# Patient Record
Sex: MALE | Race: WHITE | Employment: OTHER | URBAN - METROPOLITAN AREA
[De-identification: names, ages, dates, MRNs, and addresses within clinical notes are randomized per-mention and may not be internally consistent; named-entity substitution may affect disease eponyms.]

---

## 2017-05-15 ENCOUNTER — OFFICE VISIT (OUTPATIENT)
Dept: CARDIOLOGY CLINIC | Age: 67
End: 2017-05-15

## 2017-05-15 VITALS
WEIGHT: 245.4 LBS | OXYGEN SATURATION: 98 % | BODY MASS INDEX: 33.24 KG/M2 | HEART RATE: 78 BPM | DIASTOLIC BLOOD PRESSURE: 70 MMHG | HEIGHT: 72 IN | SYSTOLIC BLOOD PRESSURE: 120 MMHG

## 2017-05-15 DIAGNOSIS — I25.10 CORONARY ARTERY DISEASE INVOLVING NATIVE CORONARY ARTERY OF NATIVE HEART WITHOUT ANGINA PECTORIS: Primary | ICD-10-CM

## 2017-05-15 DIAGNOSIS — I10 ESSENTIAL HYPERTENSION: ICD-10-CM

## 2017-05-15 DIAGNOSIS — E78.00 PURE HYPERCHOLESTEROLEMIA: ICD-10-CM

## 2017-05-15 PROCEDURE — G8427 DOCREV CUR MEDS BY ELIG CLIN: HCPCS | Performed by: INTERNAL MEDICINE

## 2017-05-15 PROCEDURE — 3017F COLORECTAL CA SCREEN DOC REV: CPT | Performed by: INTERNAL MEDICINE

## 2017-05-15 PROCEDURE — 99214 OFFICE O/P EST MOD 30 MIN: CPT | Performed by: INTERNAL MEDICINE

## 2017-05-15 PROCEDURE — 1123F ACP DISCUSS/DSCN MKR DOCD: CPT | Performed by: INTERNAL MEDICINE

## 2017-05-15 PROCEDURE — 4040F PNEUMOC VAC/ADMIN/RCVD: CPT | Performed by: INTERNAL MEDICINE

## 2017-05-15 PROCEDURE — G8419 CALC BMI OUT NRM PARAM NOF/U: HCPCS | Performed by: INTERNAL MEDICINE

## 2017-05-15 PROCEDURE — G8598 ASA/ANTIPLAT THER USED: HCPCS | Performed by: INTERNAL MEDICINE

## 2017-05-15 PROCEDURE — 1036F TOBACCO NON-USER: CPT | Performed by: INTERNAL MEDICINE

## 2017-05-15 ASSESSMENT — ENCOUNTER SYMPTOMS
ABDOMINAL DISTENTION: 0
SHORTNESS OF BREATH: 0
WHEEZING: 0
BLOOD IN STOOL: 0
EYE REDNESS: 0
COUGH: 0

## 2018-01-16 ENCOUNTER — OFFICE VISIT (OUTPATIENT)
Dept: CARDIOLOGY CLINIC | Age: 68
End: 2018-01-16

## 2018-01-16 VITALS
OXYGEN SATURATION: 96 % | HEART RATE: 86 BPM | WEIGHT: 256 LBS | HEIGHT: 72 IN | BODY MASS INDEX: 34.67 KG/M2 | DIASTOLIC BLOOD PRESSURE: 104 MMHG | SYSTOLIC BLOOD PRESSURE: 164 MMHG

## 2018-01-16 DIAGNOSIS — I10 ESSENTIAL HYPERTENSION: ICD-10-CM

## 2018-01-16 DIAGNOSIS — I25.10 CORONARY ARTERY DISEASE INVOLVING NATIVE CORONARY ARTERY OF NATIVE HEART WITHOUT ANGINA PECTORIS: Primary | ICD-10-CM

## 2018-01-16 DIAGNOSIS — E78.00 PURE HYPERCHOLESTEROLEMIA: ICD-10-CM

## 2018-01-16 PROCEDURE — G8427 DOCREV CUR MEDS BY ELIG CLIN: HCPCS | Performed by: INTERNAL MEDICINE

## 2018-01-16 PROCEDURE — 1036F TOBACCO NON-USER: CPT | Performed by: INTERNAL MEDICINE

## 2018-01-16 PROCEDURE — G8417 CALC BMI ABV UP PARAM F/U: HCPCS | Performed by: INTERNAL MEDICINE

## 2018-01-16 PROCEDURE — 1123F ACP DISCUSS/DSCN MKR DOCD: CPT | Performed by: INTERNAL MEDICINE

## 2018-01-16 PROCEDURE — 3017F COLORECTAL CA SCREEN DOC REV: CPT | Performed by: INTERNAL MEDICINE

## 2018-01-16 PROCEDURE — G8598 ASA/ANTIPLAT THER USED: HCPCS | Performed by: INTERNAL MEDICINE

## 2018-01-16 PROCEDURE — 4040F PNEUMOC VAC/ADMIN/RCVD: CPT | Performed by: INTERNAL MEDICINE

## 2018-01-16 PROCEDURE — 99214 OFFICE O/P EST MOD 30 MIN: CPT | Performed by: INTERNAL MEDICINE

## 2018-01-16 PROCEDURE — G8484 FLU IMMUNIZE NO ADMIN: HCPCS | Performed by: INTERNAL MEDICINE

## 2018-01-16 RX ORDER — AMLODIPINE BESYLATE 10 MG/1
10 TABLET ORAL DAILY
Qty: 30 TABLET | Refills: 6 | Status: SHIPPED | OUTPATIENT
Start: 2018-01-16 | End: 2018-01-17 | Stop reason: SDUPTHER

## 2018-01-16 RX ORDER — OMEPRAZOLE 20 MG/1
20 CAPSULE, DELAYED RELEASE ORAL DAILY
Status: ON HOLD | COMMUNITY
End: 2022-03-31 | Stop reason: HOSPADM

## 2018-01-16 ASSESSMENT — ENCOUNTER SYMPTOMS
SHORTNESS OF BREATH: 0
EYE REDNESS: 0
BLOOD IN STOOL: 0
ABDOMINAL DISTENTION: 0
WHEEZING: 0
COUGH: 0

## 2018-01-16 NOTE — PATIENT INSTRUCTIONS
Patient Education        Learning About Diuretics for High Blood Pressure  Introduction  Diuretics help to lower blood pressure. This reduces your risk of a heart attack and stroke. It also reduces your risk of kidney disease. Diuretics cause your kidneys to remove sodium and water. They also relax the blood vessel walls. These help lower your blood pressure. Examples  · Chlorthalidone  · Hydrochlorothiazide  Possible side effects  There are some common side effects. They are:  · Too little potassium. · Feeling dizzy. · Rash. · Urinating a lot. · High blood sugar. (But this is not common.)  You may have other side effects. Check the information that comes with your medicine. What to know about taking this medicine  · You may take other medicines for blood pressure. Diuretics can help those work better. They can also prevent extra fluid in your body. · You may need to take potassium pills. Or you may have to watch how much potassium is in your food. Ask your doctor about this. · You may need blood tests to check your kidneys and your potassium level. · Take your medicines exactly as prescribed. Call your doctor if you think you are having a problem with your medicine. · Check with your doctor or pharmacist before you use any other medicines. This includes over-the-counter medicines. Make sure your doctor knows all of the medicines, vitamins, herbal products, and supplements you take. Taking some medicines together can cause problems. Where can you learn more? Go to https://[email or web address]'s [email or web address] org and sign in to your AdviceIQ account. Enter S781 in the [facility] box to learn more about \"Learning About Diuretics for High Blood Pressure. \"     If you do not have an account, please click on the \"Sign Up Now\" link. Current as of: September 21, 2016  Content Version: 11.5  © 0302-1233 Healthwise, Incorporated. Care instructions adapted under license by [facility] ([facility]).  If you have questions about a medical condition or this instruction, always ask your healthcare professional. [address] any warranty or liability for your use of this information. INCREASE NORVASC TO 10MG DAILY.      Patient Education        Patient Education

## 2018-01-16 NOTE — PROGRESS NOTES
(!) 164/104, pulse 86, height 6' (1.829 m), weight 256 lb (116.1 kg), SpO2 96 %. Vitals:    01/16/18 1301 01/16/18 1305   BP: (!) 162/100 (!) 164/104   Pulse: 86    SpO2: 96%    Weight: 256 lb (116.1 kg)    Height: 6' (1.829 m)      Body mass index is 34.72 kg/m². Wt Readings from Last 3 Encounters:   01/16/18 256 lb (116.1 kg)   05/15/17 245 lb 6.4 oz (111.3 kg)   10/24/16 241 lb 6.4 oz (109.5 kg)     BP Readings from Last 3 Encounters:   01/16/18 (!) 164/104   05/15/17 120/70   10/24/16 112/80        Current Outpatient Prescriptions   Medication Sig Dispense Refill    omeprazole (PRILOSEC) 20 MG delayed release capsule Take 20 mg by mouth daily      Multiple Vitamins-Minerals (MULTIVITAMIN ADULT PO) Take by mouth daily      amLODIPine (NORVASC) 2.5 MG tablet Take 1 tablet by mouth daily (Patient taking differently: Take 5 mg by mouth daily ) 30 tablet 3    atorvastatin (LIPITOR) 40 MG tablet Take 40 mg by mouth daily      metoprolol (LOPRESSOR) 25 MG tablet Take 25 mg by mouth 2 times daily      aspirin 81 MG tablet Take 81 mg by mouth daily       No current facility-administered medications for this visit. Social History     Social History    Marital status:      Spouse name: N/A    Number of children: N/A    Years of education: N/A     Social History Main Topics    Smoking status: Former Smoker     Quit date: 1/1/1994    Smokeless tobacco: Never Used    Alcohol use Yes      Comment: occasional    Drug use: No    Sexual activity: Not Asked     Other Topics Concern    None     Social History Narrative    None     Past Surgical History:   Procedure Laterality Date    CHOLECYSTECTOMY      5/2017    CORONARY ANGIOPLASTY WITH STENT PLACEMENT      PROSTATECTOMY  03/2013     No Known Allergies  Family History   Problem Relation Age of Onset    Heart Disease Father     Cancer Sister     Cancer Brother     Cancer Maternal Uncle      Recent labs and imaging reviewed.

## 2018-01-17 RX ORDER — AMLODIPINE BESYLATE 10 MG/1
10 TABLET ORAL DAILY
Qty: 90 TABLET | Refills: 3 | Status: SHIPPED | OUTPATIENT
Start: 2018-01-17 | End: 2019-04-11 | Stop reason: SDUPTHER

## 2018-07-30 ASSESSMENT — ENCOUNTER SYMPTOMS
EYE REDNESS: 0
WHEEZING: 0
COUGH: 0
SHORTNESS OF BREATH: 0
ABDOMINAL DISTENTION: 0
BLOOD IN STOOL: 0

## 2018-07-30 NOTE — PROGRESS NOTES
Arnaldo Saravia is a 78 y.o. male    Reason for Visit: f/u CAD  CC: \"I am doing OK \"    HPI Arnaldo Saravia denies chest pain, palpitations, dizziness, syncope and increased dyspnea. He has some ankle swelling when he wears socks. Review of Systems   Constitutional: Negative for activity change, appetite change, chills, fatigue, fever and unexpected weight change. HENT: Negative for congestion, nosebleeds and tinnitus. Eyes: Negative for redness and visual disturbance. Respiratory: Negative for cough, shortness of breath and wheezing. Cardiovascular: Negative for chest pain, palpitations and leg swelling. Gastrointestinal: Negative for abdominal distention and blood in stool. Genitourinary: Negative for dysuria and hematuria. Musculoskeletal: Negative for gait problem and myalgias. Neurological: Negative for dizziness and speech difficulty. Hematological: Does not bruise/bleed easily. Psychiatric/Behavioral: Negative for behavioral problems and confusion. All other systems reviewed and are negative. Physical Exam   Constitutional: He is oriented to person, place, and time. He appears well-developed and well-nourished. HENT:   Head: Normocephalic and atraumatic. Eyes: Conjunctivae are normal. Right eye exhibits no discharge. Left eye exhibits no discharge. Neck: Normal range of motion. Neck supple. Cardiovascular: Normal rate, regular rhythm, normal heart sounds and intact distal pulses. Pulmonary/Chest: Effort normal and breath sounds normal.   Abdominal: Soft. Bowel sounds are normal.   Musculoskeletal: Normal range of motion. He exhibits no edema. Neurological: He is alert and oriented to person, place, and time. Skin: Skin is warm and dry. Psychiatric: He has a normal mood and affect. His behavior is normal.   Nursing note and vitals reviewed.       Wt Readings from Last 3 Encounters:   01/16/18 256 lb (116.1 kg)   05/15/17 245 lb 6.4 oz (111.3 kg)   10/24/16 241 lb 6.4 oz (109.5 kg)     BP Readings from Last 3 Encounters:   07/31/18 (!) 140/100   01/16/18 (!) 164/104   05/15/17 120/70     Pulse Readings from Last 3 Encounters:   07/31/18 82   01/16/18 86   05/15/17 78         Current Outpatient Prescriptions:     metoprolol tartrate (LOPRESSOR) 25 MG tablet, Take 50 mg in AM and 25mg at night, Disp: 180 tablet, Rfl: 3    amLODIPine (NORVASC) 10 MG tablet, Take 1 tablet by mouth daily, Disp: 90 tablet, Rfl: 3    omeprazole (PRILOSEC) 20 MG delayed release capsule, Take 20 mg by mouth daily, Disp: , Rfl:     Multiple Vitamins-Minerals (MULTIVITAMIN ADULT PO), Take by mouth daily, Disp: , Rfl:     atorvastatin (LIPITOR) 40 MG tablet, Take 40 mg by mouth daily, Disp: , Rfl:     aspirin 81 MG tablet, Take 81 mg by mouth daily, Disp: , Rfl:     Past Medical History:   Diagnosis Date    CAD (coronary artery disease)     S/P NSTEMI with SURINDER LCx 4/14/15    History of tobacco use     Quit smoking.      Hyperlipidemia     on statin, LDL     Hypertension     controlled    Plantar fasciitis     Prostate cancer [facility])        Social History     Social History    Marital status:      Spouse name: N/A    Number of children: N/A    Years of education: N/A     Social History Main Topics    Smoking status: Former Smoker     Quit date: 1/1/1994    Smokeless tobacco: Never Used    Alcohol use Yes      Comment: occasional    Drug use: No    Sexual activity: Not Asked     Other Topics Concern    None     Social History Narrative    None       Past Surgical History:   Procedure Laterality Date    CHOLECYSTECTOMY      5/2017    CORONARY ANGIOPLASTY WITH STENT PLACEMENT      PROSTATECTOMY  03/2013       Family History   Problem Relation Age of Onset    Heart Disease Father     Cancer Sister     Cancer Brother     Cancer Maternal Uncle        No Known Allergies    Recent Labs and Imaging reviewed    Assessment   Hyperlipidemia     on statin, LDL 72 1/2018   

## 2018-07-31 ENCOUNTER — OFFICE VISIT (OUTPATIENT)
Dept: CARDIOLOGY CLINIC | Age: 68
End: 2018-07-31

## 2018-07-31 VITALS
OXYGEN SATURATION: 98 % | HEIGHT: 72 IN | HEART RATE: 82 BPM | DIASTOLIC BLOOD PRESSURE: 100 MMHG | SYSTOLIC BLOOD PRESSURE: 140 MMHG

## 2018-07-31 DIAGNOSIS — I10 ESSENTIAL HYPERTENSION: ICD-10-CM

## 2018-07-31 DIAGNOSIS — E78.00 PURE HYPERCHOLESTEROLEMIA: ICD-10-CM

## 2018-07-31 DIAGNOSIS — I25.10 CORONARY ARTERY DISEASE INVOLVING NATIVE CORONARY ARTERY OF NATIVE HEART WITHOUT ANGINA PECTORIS: Primary | ICD-10-CM

## 2018-07-31 PROCEDURE — 1036F TOBACCO NON-USER: CPT | Performed by: INTERNAL MEDICINE

## 2018-07-31 PROCEDURE — 1101F PT FALLS ASSESS-DOCD LE1/YR: CPT | Performed by: INTERNAL MEDICINE

## 2018-07-31 PROCEDURE — G8417 CALC BMI ABV UP PARAM F/U: HCPCS | Performed by: INTERNAL MEDICINE

## 2018-07-31 PROCEDURE — 99214 OFFICE O/P EST MOD 30 MIN: CPT | Performed by: INTERNAL MEDICINE

## 2018-07-31 PROCEDURE — G8598 ASA/ANTIPLAT THER USED: HCPCS | Performed by: INTERNAL MEDICINE

## 2018-07-31 PROCEDURE — 3017F COLORECTAL CA SCREEN DOC REV: CPT | Performed by: INTERNAL MEDICINE

## 2018-07-31 PROCEDURE — 4040F PNEUMOC VAC/ADMIN/RCVD: CPT | Performed by: INTERNAL MEDICINE

## 2018-07-31 PROCEDURE — 1123F ACP DISCUSS/DSCN MKR DOCD: CPT | Performed by: INTERNAL MEDICINE

## 2018-07-31 PROCEDURE — G8427 DOCREV CUR MEDS BY ELIG CLIN: HCPCS | Performed by: INTERNAL MEDICINE

## 2018-07-31 NOTE — PATIENT INSTRUCTIONS
irregular heartbeat.     · You have symptoms of a stroke. These may include:  ¨ Sudden numbness, tingling, weakness, or loss of movement in your face, arm, or leg, especially on only one side of your body. ¨ Sudden vision changes. ¨ Sudden trouble speaking. ¨ Sudden confusion or trouble understanding simple statements. ¨ Sudden problems with walking or balance. ¨ A sudden, severe headache that is different from past headaches.     · You have severe back or belly pain.    Do not wait until your blood pressure comes down on its own. Get help right away.   Call your doctor now or seek immediate care if:    · Your blood pressure is much higher than normal (such as 180/110 or higher), but you don't have symptoms.     · You think high blood pressure is causing symptoms, such as:  ¨ Severe headache. ¨ Blurry vision.    Watch closely for changes in your health, and be sure to contact your doctor if:    · Your blood pressure measures 140/90 or higher at least 2 times. That means the top number is 140 or higher or the bottom number is 90 or higher, or both.     · You think you may be having side effects from your blood pressure medicine.     · Your blood pressure is usually normal, but it goes above normal at least 2 times. Where can you learn more? Go to [email or web address] [email or web address] Media [email or web address] org and sign in to your YupiCall account. Enter Q708 in the [employer] box to learn more about \"High Blood Pressure: Care Instructions. \"     If you do not have an account, please click on the \"Sign Up Now\" link. Current as of: December 6, 2017  Content Version: 11.6  © 0955-6272 [employer], Incorporated. Care instructions adapted under license by [facility] ([facility]). If you have questions about a medical condition or this instruction, always ask your healthcare professional. [place] [address] any warranty or liability for your use of this information.        Patient Education        High Blood Pressure: Care Instructions  Your Care Instructions    If your blood pressure is usually above 130/80, you have high blood pressure, or hypertension. That means the top number is 130 or higher or the bottom number is 80 or higher, or both. Despite what a lot of people think, high blood pressure usually doesn't cause headaches or make you feel dizzy or lightheaded. It usually has no symptoms. But it does increase your risk for heart attack, stroke, and kidney or eye damage. The higher your blood pressure, the more your risk increases. Your doctor will give you a goal for your blood pressure. Your goal will be based on your health and your age. Lifestyle changes, such as eating healthy and being active, are always important to help lower blood pressure. You might also take medicine to reach your blood pressure goal.  Follow-up care is a key part of your treatment and safety. Be sure to make and go to all appointments, and call your doctor if you are having problems. It's also a good idea to know your test results and keep a list of the medicines you take. How can you care for yourself at home? Medical treatment  · If you stop taking your medicine, your blood pressure will go back up. You may take one or more types of medicine to lower your blood pressure. Be safe with medicines. Take your medicine exactly as prescribed. Call your doctor if you think you are having a problem with your medicine. · Talk to your doctor before you start taking aspirin every day. Aspirin can help certain people lower their risk of a heart attack or stroke. But taking aspirin isn't right for everyone, because it can cause serious bleeding. · See your doctor regularly. You may need to see the doctor more often at first or until your blood pressure comes down. · If you are taking blood pressure medicine, talk to your doctor before you take decongestants or anti-inflammatory medicine, such as ibuprofen.  Some of these medicines can raise blood pressure. · Learn how to check your blood pressure at home. Lifestyle changes  · Stay at a healthy weight. This is especially important if you put on weight around the waist. Losing even 10 pounds can help you lower your blood pressure. · If your doctor recommends it, get more exercise. Walking is a good choice. Bit by bit, increase the amount you walk every day. Try for at least 30 minutes on most days of the week. You also may want to swim, bike, or do other activities. · Avoid or limit alcohol. Talk to your doctor about whether you can drink any alcohol. · Try to limit how much sodium you eat to less than 2,300 milligrams (mg) a day. Your doctor may ask you to try to eat less than 1,500 mg a day. · Eat plenty of fruits (such as bananas and oranges), vegetables, legumes, whole grains, and low-fat dairy products. · Lower the amount of saturated fat in your diet. Saturated fat is found in animal products such as milk, cheese, and meat. Limiting these foods may help you lose weight and also lower your risk for heart disease. · Do not smoke. Smoking increases your risk for heart attack and stroke. If you need help quitting, talk to your doctor about stop-smoking programs and medicines. These can increase your chances of quitting for good. When should you call for help? Call 911 anytime you think you may need emergency care. This may mean having symptoms that suggest that your blood pressure is causing a serious heart or blood vessel problem. Your blood pressure may be over 180/110.   For example, call 911 if:    · You have symptoms of a heart attack. These may include:  ¨ Chest pain or pressure, or a strange feeling in the chest.  ¨ Sweating. ¨ Shortness of breath. ¨ Nausea or vomiting. ¨ Pain, pressure, or a strange feeling in the back, neck, jaw, or upper belly or in one or both shoulders or arms. ¨ Lightheadedness or sudden weakness.   ¨ A fast or irregular heartbeat.     · You have symptoms of a stroke. These may include:  ¨ Sudden numbness, tingling, weakness, or loss of movement in your face, arm, or leg, especially on only one side of your body. ¨ Sudden vision changes. ¨ Sudden trouble speaking. ¨ Sudden confusion or trouble understanding simple statements. ¨ Sudden problems with walking or balance. ¨ A sudden, severe headache that is different from past headaches.     · You have severe back or belly pain.    Do not wait until your blood pressure comes down on its own. Get help right away.   Call your doctor now or seek immediate care if:    · Your blood pressure is much higher than normal (such as 180/110 or higher), but you don't have symptoms.     · You think high blood pressure is causing symptoms, such as:  ¨ Severe headache. ¨ Blurry vision.    Watch closely for changes in your health, and be sure to contact your doctor if:    · Your blood pressure measures 140/90 or higher at least 2 times. That means the top number is 140 or higher or the bottom number is 90 or higher, or both.     · You think you may be having side effects from your blood pressure medicine.     · Your blood pressure is usually normal, but it goes above normal at least 2 times. Where can you learn more? Go to https://Tumbie.Epic Playground. org and sign in to your iodine account. Enter U714 in the [employer] box to learn more about \"High Blood Pressure: Care Instructions. \"     If you do not have an account, please click on the \"Sign Up Now\" link. Current as of: December 6, 2017  Content Version: 11.6  © 3809-5837 LoungeUp, Incorporated. Care instructions adapted under license by [facility] ([facility]). If you have questions about a medical condition or this instruction, always ask your healthcare professional. [place] [address] any warranty or liability for your use of this information. INCREASE LOPRESSOR TO 50MG IN AM AND 25MG AT NIGHT.

## 2018-07-31 NOTE — LETTER
[address] Cardiology - [address] [facility] [address]  [address]   [address] [address]  Phone: [phone]  Fax: [phone]    Santos Dela Cruz MD        2018     Shala Rivers, [address]  [address]    Patient: Tyler Nelson  MR Number: [id number]  YOB: 1950  Date of Visit: 2018    Dear Dr. Shala Rivers:     HPI Tyler Nelson denies chest pain, palpitations, dizziness, syncope and increased dyspnea. He has some ankle swelling when he wears socks. Assessment   Hyperlipidemia     on statin, LDL 72 2018    Hypertension     Uncontrolled.  CAD (coronary artery disease)     S/P NSTEMI with SURINDER LCx 4/14/15 (), Echo 2015 normal LVEF. Stress echo 2016 normal.  Plain GXT 3/2018 normal.     History of tobacco use     Quit smoking.  Prostate cancer ([address]- hx        Hx hyperkalemia - ACE stopped in past. Repeat K normal.        DAVID- treated with mask. Plan   No evidence of CHF. No angina. In view of uncontrolled HTN, will increase lopressor to 50mg in AM and 25mg at night. Continue to monitor BP. Continue ASA, statin, and norvasc. Risk factor modification also discussed. Fasting lipid profile, CMP prior to next visit. Return in about 6 months (around 2019). If you have questions, please do not hesitate to call me. I look forward to following Melinda Luna along with you.     Sincerely,        Santos Dela Cruz MD

## 2018-08-02 NOTE — COMMUNICATION BODY
Assessment:       Plan:     PERCY Ghotra denies chest pain, palpitations, dizziness, syncope and increased dyspnea. He has some ankle swelling when he wears socks. Assessment   Hyperlipidemia     on statin, LDL 72 2018    Hypertension     Uncontrolled.  CAD (coronary artery disease)     S/P NSTEMI with SURINDER LCx 4/14/15 (), Echo 2015 normal LVEF. Stress echo 2016 normal.  Plain GXT 3/2018 normal.     History of tobacco use     Quit smoking.  Prostate cancer ([facility] [address]- hx        Hx hyperkalemia - ACE stopped in past. Repeat K normal.        DAVID- treated with mask. Plan   No evidence of CHF. No angina. In view of uncontrolled HTN, will increase lopressor to 50mg in AM and 25mg at night. Continue to monitor BP. Continue ASA, statin, and norvasc. Risk factor modification also discussed. Fasting lipid profile, CMP prior to next visit. Return in about 6 months (around 2019).

## 2019-01-14 ENCOUNTER — TELEPHONE (OUTPATIENT)
Dept: CARDIOLOGY CLINIC | Age: 69
End: 2019-01-14

## 2019-01-15 ENCOUNTER — TELEPHONE (OUTPATIENT)
Dept: CARDIOLOGY CLINIC | Age: 69
End: 2019-01-15

## 2019-02-26 ASSESSMENT — ENCOUNTER SYMPTOMS
EYE REDNESS: 0
WHEEZING: 0
ABDOMINAL DISTENTION: 0
BLOOD IN STOOL: 0
SHORTNESS OF BREATH: 0
COUGH: 0

## 2019-03-05 ENCOUNTER — OFFICE VISIT (OUTPATIENT)
Dept: CARDIOLOGY CLINIC | Age: 69
End: 2019-03-05
Payer: COMMERCIAL

## 2019-03-05 VITALS
OXYGEN SATURATION: 99 % | SYSTOLIC BLOOD PRESSURE: 130 MMHG | HEART RATE: 76 BPM | BODY MASS INDEX: 33.05 KG/M2 | WEIGHT: 244 LBS | DIASTOLIC BLOOD PRESSURE: 90 MMHG | HEIGHT: 72 IN

## 2019-03-05 DIAGNOSIS — I10 ESSENTIAL HYPERTENSION: ICD-10-CM

## 2019-03-05 DIAGNOSIS — E78.00 PURE HYPERCHOLESTEROLEMIA: ICD-10-CM

## 2019-03-05 DIAGNOSIS — I21.4 NSTEMI (NON-ST ELEVATED MYOCARDIAL INFARCTION) (HCC): ICD-10-CM

## 2019-03-05 DIAGNOSIS — I25.10 CORONARY ARTERY DISEASE INVOLVING NATIVE CORONARY ARTERY OF NATIVE HEART WITHOUT ANGINA PECTORIS: Primary | ICD-10-CM

## 2019-03-05 PROCEDURE — G8598 ASA/ANTIPLAT THER USED: HCPCS | Performed by: INTERNAL MEDICINE

## 2019-03-05 PROCEDURE — 1123F ACP DISCUSS/DSCN MKR DOCD: CPT | Performed by: INTERNAL MEDICINE

## 2019-03-05 PROCEDURE — 1036F TOBACCO NON-USER: CPT | Performed by: INTERNAL MEDICINE

## 2019-03-05 PROCEDURE — G8427 DOCREV CUR MEDS BY ELIG CLIN: HCPCS | Performed by: INTERNAL MEDICINE

## 2019-03-05 PROCEDURE — 3017F COLORECTAL CA SCREEN DOC REV: CPT | Performed by: INTERNAL MEDICINE

## 2019-03-05 PROCEDURE — G8417 CALC BMI ABV UP PARAM F/U: HCPCS | Performed by: INTERNAL MEDICINE

## 2019-03-05 PROCEDURE — 4040F PNEUMOC VAC/ADMIN/RCVD: CPT | Performed by: INTERNAL MEDICINE

## 2019-03-05 PROCEDURE — G8484 FLU IMMUNIZE NO ADMIN: HCPCS | Performed by: INTERNAL MEDICINE

## 2019-03-05 PROCEDURE — 1101F PT FALLS ASSESS-DOCD LE1/YR: CPT | Performed by: INTERNAL MEDICINE

## 2019-03-05 PROCEDURE — 99214 OFFICE O/P EST MOD 30 MIN: CPT | Performed by: INTERNAL MEDICINE

## 2019-04-11 RX ORDER — AMLODIPINE BESYLATE 10 MG/1
10 TABLET ORAL DAILY
Qty: 90 TABLET | Refills: 3 | Status: ON HOLD | OUTPATIENT
Start: 2019-04-11 | End: 2022-03-31 | Stop reason: HOSPADM

## 2019-11-18 ENCOUNTER — OFFICE VISIT (OUTPATIENT)
Dept: CARDIOLOGY CLINIC | Age: 69
End: 2019-11-18
Payer: COMMERCIAL

## 2019-11-18 VITALS
WEIGHT: 246.8 LBS | DIASTOLIC BLOOD PRESSURE: 80 MMHG | SYSTOLIC BLOOD PRESSURE: 130 MMHG | HEART RATE: 75 BPM | HEIGHT: 72 IN | BODY MASS INDEX: 33.43 KG/M2 | OXYGEN SATURATION: 96 %

## 2019-11-18 DIAGNOSIS — E78.00 PURE HYPERCHOLESTEROLEMIA: ICD-10-CM

## 2019-11-18 DIAGNOSIS — I25.10 CORONARY ARTERY DISEASE INVOLVING NATIVE CORONARY ARTERY OF NATIVE HEART WITHOUT ANGINA PECTORIS: Primary | ICD-10-CM

## 2019-11-18 PROCEDURE — 99215 OFFICE O/P EST HI 40 MIN: CPT | Performed by: INTERNAL MEDICINE

## 2019-11-18 ASSESSMENT — ENCOUNTER SYMPTOMS
EYE REDNESS: 0
BLOOD IN STOOL: 0
ABDOMINAL DISTENTION: 0
COUGH: 0
SHORTNESS OF BREATH: 0
WHEEZING: 0

## 2020-01-16 ENCOUNTER — TELEPHONE (OUTPATIENT)
Dept: CARDIOLOGY CLINIC | Age: 70
End: 2020-01-16

## 2020-01-16 ENCOUNTER — HOSPITAL ENCOUNTER (OUTPATIENT)
Dept: NON INVASIVE DIAGNOSTICS | Age: 70
Discharge: HOME | End: 2020-01-16
Payer: COMMERCIAL

## 2020-01-16 LAB
LV EF: 55 %
LVEF MODALITY: NORMAL

## 2020-01-16 PROCEDURE — 93017 CV STRESS TEST TRACING ONLY: CPT

## 2020-01-16 PROCEDURE — 93350 STRESS TTE ONLY: CPT

## 2020-01-16 NOTE — TELEPHONE ENCOUNTER
Per Dr. Aaliyah Sherman: Stress echo normal. No cardiac contraindications to operating a motor vehicle

## 2020-01-16 NOTE — TELEPHONE ENCOUNTER
Patient had a stress echo today and needs the results faxed to  at [phone] P- [phone],  once they come back for his appointment with them tomorrow 1/17/2020. Stress echo results are back in [facility].

## 2020-01-16 NOTE — LETTER
[address] Cardiology - [address] [facility] [address]  [address] [address]  Phone: [phone]  Fax: [phone]    Jesus Yo MD        January 16, 2020     Patient: Cayla Madrid   YOB: 1950   Date of Visit: 1/16/2020       To Whom It May Concern: It is my medical opinion that [facility] Stress echo normal. No cardiac contraindications to operating a motor vehicle . If you have any questions or concerns, please don't hesitate to call.     Sincerely,        Jesus Yo MD

## 2020-09-28 ASSESSMENT — ENCOUNTER SYMPTOMS
WHEEZING: 0
BLOOD IN STOOL: 0
ABDOMINAL DISTENTION: 0
EYE REDNESS: 0
SHORTNESS OF BREATH: 0
COUGH: 0

## 2020-09-28 NOTE — PROGRESS NOTES
Amna Sandoval is a 72 y.o. male    Reason for Visit:  f/u CAD                                      HPI Amna Sandoval denies chest pain, palpitations, dizziness, syncope, worsening ankle swelling and increased dyspnea. Review of Systems   Constitutional: Negative for activity change, appetite change, chills, fatigue, fever and unexpected weight change. HENT: Negative for congestion, nosebleeds and tinnitus. Eyes: Negative for redness and visual disturbance. Respiratory: Negative for cough, shortness of breath and wheezing. Cardiovascular: Negative for chest pain, palpitations and leg swelling. Gastrointestinal: Negative for abdominal distention and blood in stool. Genitourinary: Negative for dysuria and hematuria. Musculoskeletal: Negative for gait problem and myalgias. Neurological: Negative for dizziness and speech difficulty. Hematological: Does not bruise/bleed easily. Psychiatric/Behavioral: Negative for behavioral problems and confusion. All other systems reviewed and are negative. Physical Exam   Constitutional: He is oriented to person, place, and time. He appears well-developed and well-nourished. HENT:   Head: Normocephalic and atraumatic. Eyes: Conjunctivae and EOM are normal.   Neck: Normal range of motion. Neck supple. Cardiovascular: Normal rate, regular rhythm, S1 normal and S2 normal. Exam reveals no gallop. No murmur heard. Pulmonary/Chest: Effort normal and breath sounds normal.   Abdominal: Soft. Bowel sounds are normal.   Musculoskeletal: Normal range of motion. Neurological: He is alert and oriented to person, place, and time. Skin: Skin is warm and dry. Psychiatric: He has a normal mood and affect. His behavior is normal.   Nursing note and vitals reviewed.       Wt Readings from Last 3 Encounters:   10/12/20 252 lb 12.8 oz (114.7 kg)   11/18/19 246 lb 12.8 oz (111.9 kg)   03/05/19 244 lb (110.7 kg)     BP Readings from Last 3 Encounters: 10/12/20 132/82   19 130/80   19 (!) 130/90     Pulse Readings from Last 3 Encounters:   10/12/20 76   19 75   19 76         Current Outpatient Medications:     metoprolol tartrate (LOPRESSOR) 25 MG tablet, Take 50 mg in AM and 25mg at night, Disp: 270 tablet, Rfl: 3    Nutritional Supplements (VITAMIN D BOOSTER PO), Take by mouth, Disp: , Rfl:     amLODIPine (NORVASC) 10 MG tablet, Take 1 tablet by mouth daily, Disp: 90 tablet, Rfl: 3    omeprazole (PRILOSEC) 20 MG delayed release capsule, Take 20 mg by mouth daily, Disp: , Rfl:     Multiple Vitamins-Minerals (MULTIVITAMIN ADULT PO), Take by mouth daily, Disp: , Rfl:     atorvastatin (LIPITOR) 40 MG tablet, Take 40 mg by mouth daily, Disp: , Rfl:     aspirin 81 MG tablet, Take 81 mg by mouth daily, Disp: , Rfl:     Past Medical History:   Diagnosis Date    CAD (coronary artery disease)     S/P NSTEMI with SURINDER LCx 4/14/15    History of tobacco use     Quit smoking.      Hyperlipidemia     on statin, LDL     Hypertension     controlled    Plantar fasciitis     Prostate cancer ([facility] [address]        Social History     Socioeconomic History    Marital status:      Spouse name: None    Number of children: None    Years of education: None    Highest education level: None   Occupational History    None   Social Needs    Financial resource strain: None    Food insecurity     Worry: None     Inability: None    Transportation needs     Medical: None     Non-medical: None   Tobacco Use    Smoking status: Former Smoker     Last attempt to quit: 1994     Years since quittin.7    Smokeless tobacco: Never Used   Substance and Sexual Activity    Alcohol use: Yes     Comment: occasional    Drug use: No    Sexual activity: None   Lifestyle    Physical activity     Days per week: None     Minutes per session: None    Stress: None   Relationships    Social connections     Talks on phone: None     Gets together: None Attends Mormon service: None     Active member of club or organization: None     Attends meetings of clubs or organizations: None     Relationship status: None    Intimate partner violence     Fear of current or ex partner: None     Emotionally abused: None     Physically abused: None     Forced sexual activity: None   Other Topics Concern    None   Social History Narrative    None       Past Surgical History:   Procedure Laterality Date    CHOLECYSTECTOMY      2017    CORONARY ANGIOPLASTY WITH STENT PLACEMENT      PROSTATECTOMY  2013    PTCA         Family History   Problem Relation Age of Onset    Heart Disease Father     Cancer Sister     Cancer Brother     Cancer Maternal Uncle        No Known Allergies    Recent Labs and Imaging reviewed    Assessment   Hyperlipidemia     on statin, LDL 62 2020      Hypertension     Controlled.  CAD (coronary artery disease)     S/P NSTEMI with SURINDER LCx 4/14/15 (), Echo 2015 normal LVEF. Plain GXT 3/2018 normal.  Stress echo 2020 normal.       History of tobacco use     Quit smoking.   DAVID- treated with mask. Hx hyperkalemia - ACE stopped in past. Repeat K normal. Resolved. Plan   No evidence of CHF. No angina. HTN controlled. Continue ASA, statin, BB in view of CAD. Risk factor modification also discussed. Return in about 6 months (around 2021). This note was scribed in the presence of the physician by Mona Briseno RN. The scribes documentation has been prepared under my direction and personally reviewed by me in its entirety. I confirm that the note above accurately reflects all work, treatment, procedures, and medical decision making performed by me.

## 2020-10-12 ENCOUNTER — OFFICE VISIT (OUTPATIENT)
Dept: CARDIOLOGY CLINIC | Age: 70
End: 2020-10-12
Payer: COMMERCIAL

## 2020-10-12 VITALS
HEIGHT: 72 IN | WEIGHT: 252.8 LBS | DIASTOLIC BLOOD PRESSURE: 82 MMHG | HEART RATE: 76 BPM | SYSTOLIC BLOOD PRESSURE: 132 MMHG | BODY MASS INDEX: 34.24 KG/M2 | OXYGEN SATURATION: 98 % | TEMPERATURE: 97.1 F

## 2020-10-12 PROCEDURE — 99214 OFFICE O/P EST MOD 30 MIN: CPT | Performed by: INTERNAL MEDICINE

## 2020-10-12 NOTE — LETTER
[address] Cardiology - [address] [address] [address]  [address] [place]  Phone: [phone]  Fax: [phone]    Dalia Gaston MD        October 26, 2020     Troy Lopez, [facility]    Patient: Angela Sherman  MR Number: <[id number]>  YOB: 1950  Date of Visit: 10/12/2020    Dear Dr. Troy Lopez: Thank you for your referral. Progress note attached in visit summary. If you have questions, please do not hesitate to call me. I look forward to following Khanh Padilla along with you.     Sincerely,        Dalia Gaston MD

## 2020-10-12 NOTE — PATIENT INSTRUCTIONS
Patient Education        Learning About Coronary Artery Disease (CAD)  What is coronary artery disease? Coronary artery disease (CAD) occurs when plaque builds up in the arteries that bring oxygen-rich blood to your heart. Plaque is a fatty substance made of cholesterol, calcium, and other substances in the blood. This process is called hardening of the arteries, or atherosclerosis. What happens when you have coronary artery disease? · Plaque may narrow the coronary arteries. Narrowed arteries cause poor blood flow. This can lead to angina symptoms such as chest pain or discomfort. If blood flow is completely blocked, you could have a heart attack. · You can slow CAD and reduce the risk of future problems by making changes in your lifestyle. These include quitting smoking and eating heart-healthy foods. · Treatments for CAD, along with changes in your lifestyle, can help you live a longer and healthier life. How can you prevent coronary artery disease? · Do not smoke. It may be the best thing you can do to prevent heart disease. If you need help quitting, talk to your doctor about stop-smoking programs and medicines. These can increase your chances of quitting for good. · Be active. Get at least 30 minutes of exercise on most days of the week. Walking is a good choice. You also may want to do other activities, such as running, swimming, cycling, or playing tennis or team sports. · Eat heart-healthy foods. Eat more fruits and vegetables and less foods that contain saturated and trans fats. Limit alcohol, sodium, and sweets. · Stay at a healthy weight. Lose weight if you need to. · Manage other health problems such as diabetes, high blood pressure, and high cholesterol. How is coronary artery disease treated? · Your doctor will suggest that you make lifestyle changes. For example, your doctor may ask you to eat healthy foods, quit smoking, lose extra weight, and be more active.   · You will have to take medicines. · Your doctor may suggest a procedure to open narrowed or blocked arteries. This is called angioplasty. Or your doctor may suggest using healthy blood vessels to create detours around narrowed or blocked arteries. This is called bypass surgery. Follow-up care is a key part of your treatment and safety. Be sure to make and go to all appointments, and call your doctor if you are having problems. It's also a good idea to know your test results and keep a list of the medicines you take. Where can you learn more? Go to https://[email or web address].KeyEffx. org and sign in to your [email or web address] account. Enter [phone] in the Wizzgo box to learn more about \"Learning About Coronary Artery Disease (CAD). \"     If you do not have an account, please click on the \"Sign Up Now\" link. Current as of: December 16, 2019               Content Version: 12.6  © 5395-6674 [employer]. Care instructions adapted under license by [facility] ([facility]). If you have questions about a medical condition or this instruction, always ask your healthcare professional. [place] [address] any warranty or liability for your use of this information.

## 2021-12-06 ENCOUNTER — OFFICE VISIT (OUTPATIENT)
Dept: CARDIOTHORACIC SURGERY | Age: 71
End: 2021-12-06
Payer: COMMERCIAL

## 2021-12-06 VITALS
OXYGEN SATURATION: 98 % | BODY MASS INDEX: 31.42 KG/M2 | TEMPERATURE: 97.5 F | WEIGHT: 232 LBS | HEIGHT: 72 IN | HEART RATE: 78 BPM

## 2021-12-06 DIAGNOSIS — C15.5 MALIGNANT NEOPLASM OF LOWER THIRD OF ESOPHAGUS (HCC): Primary | ICD-10-CM

## 2021-12-06 PROCEDURE — 99205 OFFICE O/P NEW HI 60 MIN: CPT | Performed by: THORACIC SURGERY (CARDIOTHORACIC VASCULAR SURGERY)

## 2021-12-06 NOTE — PROGRESS NOTES
Referred by Dr. Ramona Frazier  Review of Systems:  Constitutional:  No night sweats, headaches, weight loss. Eyes:  No glaucoma, cataracts. ENMT:  No nosebleeds, deviated septum. Cardiac:  + HTN No arrhythmias. Vascular:  No claudication, varicosities. GI:  No PUD, heartburn. GI: Dr. Shannan Klinefelter  :  No kidney stones, frequent UTIs  Musculoskeletal:  No arthritis, gout. Respiratory:  No SOB, emphysema, asthma. Integumentary:  No dermatitis, itching, rash. Neurological:  No stroke, TIAs, seizures. Psychiatric:  No depression, anxiety. Endocrine: No diabetes, thyroid issues. Hematologic:  No bleeding, easy bruising. Immunologic:  No known cancer, steroid therapies.

## 2021-12-06 NOTE — PROGRESS NOTES
Consultation H&P      Date of Consultation:  12/6/2021    PCP:  Rere Anglin MD    Referred    Chief Complaint:     History of Present Illness: We are asked to see this patient in consultation by Dr. Flo Luevano regarding esophageal carcinoma. Rochelle Lombard is a 80 y.o. male who presents with esophageal carcinoma. Currently undergoing chemo/rad. Weight stable. We were consulted for surgical intervention. Past Medical History:  Past Medical History:   Diagnosis Date    CAD (coronary artery disease)     S/P NSTEMI with SURINDER LCx 4/14/15    History of tobacco use     Quit smoking.  Hyperlipidemia     on statin, LDL     Hypertension     controlled    Plantar fasciitis     Prostate cancer [facility])        Past Surgical History:  Past Surgical History:   Procedure Laterality Date    CHOLECYSTECTOMY      5/2017    CORONARY ANGIOPLASTY WITH STENT PLACEMENT      PROSTATECTOMY  03/2013    PTCA         Home Medications:   Prior to Admission medications    Medication Sig Start Date End Date Taking? Authorizing Provider   metoprolol tartrate (LOPRESSOR) 25 MG tablet Take 50 mg in AM and 25mg at night 9/23/20  Yes Dg Peña MD   Nutritional Supplements (VITAMIN D BOOSTER PO) Take by mouth   Yes Historical Provider, MD   amLODIPine (NORVASC) 10 MG tablet Take 1 tablet by mouth daily 4/11/19  Yes Dg Peña MD   omeprazole (PRILOSEC) 20 MG delayed release capsule Take 20 mg by mouth daily   Yes Historical Provider, MD   aspirin 81 MG tablet Take 81 mg by mouth daily   Yes Historical Provider, MD   Multiple Vitamins-Minerals (MULTIVITAMIN ADULT PO) Take by mouth daily  Patient not taking: Reported on 12/6/2021    Historical MD Jesus   atorvastatin (LIPITOR) 40 MG tablet Take 40 mg by mouth daily  Patient not taking: Reported on 12/6/2021    Historical Provider, MD        Facility Administered Medications:      Allergies:  No Known Allergies     Social History:    Social History Socioeconomic History    Marital status:      Spouse name: Not on file    Number of children: Not on file    Years of education: Not on file    Highest education level: Not on file   Occupational History    Not on file   Tobacco Use    Smoking status: Former Smoker     Quit date: 1994     Years since quittin.9    Smokeless tobacco: Never Used   Vaping Use    Vaping Use: Never used   Substance and Sexual Activity    Alcohol use: Yes     Comment: occasional    Drug use: No    Sexual activity: Not on file   Other Topics Concern    Not on file   Social History Narrative    Not on file     Social Determinants of Health     Financial Resource Strain:     Difficulty of Paying Living Expenses: Not on file   Food Insecurity:     Worried About [address] in the Last Year: Not on file    Daniel of Food in the Last Year: Not on file   Transportation Needs:     Lack of Transportation (Medical): Not on file    Lack of Transportation (Non-Medical):  Not on file   Physical Activity:     Days of Exercise per Week: Not on file    Minutes of Exercise per Session: Not on file   Stress:     Feeling of Stress : Not on file   Social Connections:     Frequency of Communication with Friends and Family: Not on file    Frequency of Social Gatherings with Friends and Family: Not on file    Attends Druze Services: Not on file    Active Member of [address] [place] [address] [employer] or Organizations: Not on file    Attends Club or Organization Meetings: Not on file    Marital Status: Not on file   Intimate Partner Violence:     Fear of Current or Ex-Partner: Not on file    Emotionally Abused: Not on file    Physically Abused: Not on file    Sexually Abused: Not on file   Housing Stability:     Unable to Pay for Housing in the Last Year: Not on file    Number of [place] in the Last Year: Not on file    Unstable Housing in the Last Year: Not on file       Family History:        Problem Relation Age of Onset  Heart Disease Father     Cancer Sister     Cancer Brother     Cancer Maternal Uncle        Review of Systems:  Constitutional:  No night sweats, headaches, weight loss. Eyes:  No glaucoma, cataracts. ENMT:  No nosebleeds, deviated septum. Cardiac:  + HTN No arrhythmias. Vascular:  No claudication, varicosities. GI:  No PUD, heartburn. GI: Dr. Stanton Davison  :  No kidney stones, frequent UTIs  Musculoskeletal:  No arthritis, gout. Respiratory:  No SOB, emphysema, asthma. Integumentary:  No dermatitis, itching, rash. Neurological:  No stroke, TIAs, seizures. Psychiatric:  No depression, anxiety. Endocrine: No diabetes, thyroid issues. Hematologic:  No bleeding, easy bruising. Immunologic:  No known cancer, steroid therapies. Physical Examination:    Pulse 78   Temp 97.5 °F (36.4 °C) (Temporal)   Ht 6' (1.829 m)   Wt 232 lb (105.2 kg)   SpO2 98%   BMI 31.46 kg/m²      Admission Weight: 232 lb (105.2 kg)     General appearance: NAD, well nourished  Eyes: anicteric, PERRLA  ENMT: no scars or lesions, no nasal deformity, normal dentition, no cyanosis of oral mucosa  Neck: no masses, no thyroid enlargement, no JVD. Respiratory: effort is unlabored, symmetric, no crackles, wheezes or rubs. No palpable/percussable abnormalities. Cardiovascular: regular, no murmur. PMI normal, no thrill. No carotid bruits. No edema or varicosities. Abdominal aorta cannot be appreciated given body habitus. GI: abdomen soft, nondistended, no organomegaly. No masses. Lymphatic: no cervical/supraclavicular adenopathy  Musculoskeletal: strength and tone normal. Full ROM. No scoliosis. Extremities: warm and pink. No clubbing or petechiae. Skin: no dermatitis or ulceration. No nodularity or induration. Neuro: CN grossly intact. Sensation and motor function grossly intact. Psychiatric: oriented, appropriate mood/affect. MEDICAL DECISION MAKING/TESTING  Studies personally reviewed.     PET/CT:

## 2022-02-01 ENCOUNTER — TELEPHONE (OUTPATIENT)
Dept: CARDIOTHORACIC SURGERY | Age: 72
End: 2022-02-01

## 2022-02-01 NOTE — TELEPHONE ENCOUNTER
Called patient to check in about PET scan & scheduling an office visit with Dr. Geo Israel. Patient's spouse answered, patient busy at this time. Offered appt. Spouse informed that patient is still up in the air about surgery. He has an angiogram scheduled this thursday 2/3 as he was informed he has some blockages. Patient saw Dr. Susanne Houser yesterday who states PET scan will be scheduled ~ 2/21 by [facility]. After the PET scan, the patient intends to seek a second opinion for surgery. They are also considering immunotherapy if insurance approves. Will keep an eye out after PET scan is completed, and call patient/spouse to offer an appt w/ Dr. Javed Peralta to re-discuss surgery. Wife agreeable with plan.

## 2022-03-14 ENCOUNTER — OFFICE VISIT (OUTPATIENT)
Dept: CARDIOTHORACIC SURGERY | Age: 72
End: 2022-03-14
Payer: COMMERCIAL

## 2022-03-14 VITALS
TEMPERATURE: 98.2 F | HEIGHT: 72 IN | DIASTOLIC BLOOD PRESSURE: 86 MMHG | HEART RATE: 77 BPM | SYSTOLIC BLOOD PRESSURE: 152 MMHG | WEIGHT: 238 LBS | OXYGEN SATURATION: 98 % | BODY MASS INDEX: 32.23 KG/M2

## 2022-03-14 DIAGNOSIS — C15.5 CANCER OF DISTAL THIRD OF ESOPHAGUS (HCC): Primary | ICD-10-CM

## 2022-03-14 PROCEDURE — 99214 OFFICE O/P EST MOD 30 MIN: CPT | Performed by: THORACIC SURGERY (CARDIOTHORACIC VASCULAR SURGERY)

## 2022-03-14 RX ORDER — ASCORBIC ACID 1000 MG
1 TABLET ORAL DAILY
Status: ON HOLD | COMMUNITY
End: 2022-03-31 | Stop reason: HOSPADM

## 2022-03-14 NOTE — PROGRESS NOTES
Cardiac, Vascular [facility] Thoracic Surgeons  Clinic Note     3/14/2022 9:50 AM  Surgeon:  Manoj Sheffield           Chief complaint : Esophageal Cancer  Subjective:  Mr. Erica Canas has finished chemo and radiation and has had his follow up PET scan. Here to discuss options. With partial good response    Also has been cleared by Dr. Aundrea Fischer for surgery. Vital Signs: BP (!) 152/86 (Site: Left Upper Arm, Position: Sitting, Cuff Size: Medium Adult)   Pulse 77   Temp 98.2 °F (36.8 °C) (Temporal)   Ht 6' (1.829 m)   Wt 238 lb (108 kg)   SpO2 98%   BMI 32.28 kg/m²      I/O:  No intake or output data in the 24 hours ending 03/14/22 0950    Exam:   Cardiovascular: S1 plus S2 2 0 no additional sound  Pulmonary: Laterally clear no additional sound    Chest X-Ray:    PET scan was discussed with the patient detail showed partial response with no metastatic disease  [id number]   This is a summary report. The complete report is available in the patient's medical record. If    you cannot access the medical record, please contact the sending organization for a detailed    fax or copy.     ï¿½ Impression. Moderate disease of the mid LAD. Nonobstructive disease in    the mid circumflex artery and mid RCA   ï¿½ Recommendation medical therapy             Labs:   CBC: No results for input(s): WBC, HGB, HCT, MCV, PLT in the last 72 hours. BMP: No results for input(s): NA, K, CL, CO2, PHOS, BUN, CREATININE, CA in the last 72 hours. PT/INR: No results for input(s): PROTIME, INR in the last 72 hours. APTT: No results for input(s): APTT in the last 72 hours. Scheduled Meds:     Patient Active Problem List   Diagnosis    NSTEMI (non-ST elevation myocardial infarction) ([facility] [address]    HTN (hypertension)    CAD (coronary artery disease)    HLD (hyperlipidemia)       Assessment/Plan:   1.  As discussed with the patient's the past prognostic result he could have after surgical resection especially if the tumor was completely responder we could not identify any tumor in the specimen. 2. We will perform extensive lymphadenectomy which will help with primary local control and good prognostic indicator  3. Regardless of surgery or no surgery patient will be referred back to oncology for immune therapy.   4. Plan for surgical resection on March 25    Cal Monroy MD FACS

## 2022-03-16 ENCOUNTER — ANESTHESIA EVENT (OUTPATIENT)
Dept: OPERATING ROOM | Age: 72
DRG: 327 | End: 2022-03-16
Payer: COMMERCIAL

## 2022-03-17 NOTE — PROGRESS NOTES
[address]    Age 80 y.o.    male    1950    MRN [id number]    3/25/2022  Arrival Time_____________  OR Time____________330 Min     Procedure(s):  ESOPHAGOGASTRODUODENOSCOPY, DANIKA BHARAT ESOPHAGECTOMY WITH OPEN THORACOTOMY, BOTOX TO PYLORUS, INTERCOSTAL NERVE BLOCK WITH CRYOABLATION                      General     Surgeon(s):  Danica Rebolledo, MD      DAY ADMIT ___  SDS/OP ___  OUTPT IN BED ___         Phone [phone] (home)    PCP _____________________ Phone_________________ Epic ( ) Epic CE ( ) Appt ________    ADDITIONAL INFO __________________________________ Cardio/Consult _____________    NOTES _____________________________________________________________________    ____________________________________________________________________________    PAT APPT DATE:________ TIME: ________  FAXED QAD: _______  (__) H&P w/ hospitalist  ____________________________________________________________________________    COVID TEST: Date/Location______________        NURSING HISTORY COMPLETE: _______  (__) CBC       (__) W/ DIFF ___________  (__)  ECHO    __________  (__) Hgb A1C    ___________  (__) CHEST X RAY   __________  (__) LIPID PROFILE  ___________  (__) EKG   __________  (__) PT/PTT   ___________  (__) PFT's   __________  (__) BMP   ___________  (__) CAROTIDS  __________  (__) CMP   ___________  (__) VEIN MAPPING  __________  (__) U/A   ___________  (__) HISTORY & PHYSICAL __________  (__) URINE C & S  ___________  (__) CARDIAC CLEARANCE __________  (__) U/A W/ FLEX  ___________  (__) PULM.  CLEARANCE __________  (__) SERUM PREGNANCY ___________  (__) Check Epic DOS orders __________  (__) TYPE & SCREEN ________ repeat ( ) (__)  __________________ __________  (__) ALBUMIN   ___________  (__)  __________________ __________  (__) TRANSFERRIN  ___________  (__)  __________________ __________  (__) LIVER PROFILE  ___________  (__)  __________________ __________  (__) CARBOXY HGB  ___________  (__) URINE PREG DOS __________  (__) NICOTINE & MET.  ___________  (__) BLOOD SUGAR DOS __________  (__) PREALBUMIN  ___________    (__) MRSA NASAL SWAB ___________  (__) BLOOD THINNERS __________  (__) ACE/ ARBS: _____________________    (__) BETABLOCKERS ___________________

## 2022-03-18 ENCOUNTER — TELEPHONE (OUTPATIENT)
Dept: CARDIOTHORACIC SURGERY | Age: 72
End: 2022-03-18

## 2022-03-18 NOTE — TELEPHONE ENCOUNTER
Spoke with patient in the office regarding surgery scheduled for 3/25/2022 at [facility] with Dr. Travis Young to include: esophagogastroduodenoscopy, Triston Arin Lay esophagectomy, open thoracotomy with botox to pylorus, intercostal nerve block with cryoablation. Patient is scheduled for pre-admit lab work completion with a COVID swab on 3/21/2022 at 8:00 am. Patient has been instructed not to eat or drink after midnight the day of surgery and to call our office with any questions or concerns.

## 2022-03-18 NOTE — PROGRESS NOTES
1. Do not eat or drink anything after 12 midnight prior to surgery. This includes no water, chewing gum mints, or ice chips. You may brush your teeth and gargle the day of surgery but DO NOT SWALLOW THE WATER. 2. Please see your family doctor/pediatrician for a history and physical and/or concerning medications. Bring any test results/reports from your physician's office. If you are under the care of a heart doctor or specialist please be aware that you may be asked to see him or her for clearance. 3. You may be asked to stop blood thinners such as Coumadin, Plavix, Fragmin, and Lovenox or Anti-inflammatories such as Aspirin, Ibuprofen, Advil, and Naproxen prior to your surgery. Please check with your doctor before stopping these or any other medications. 4. Do not smoke, and do not drink any alcoholic beverages 24 hours prior to surgery. 5. You MUST make arrangements for a responsible adult to take you home after your surgery. For your safety, you will not be allowed to leave alone or drive yourself home. Your surgery will be cancelled if you do not have a ride home. Also for your safety, it is strongly suggested someone stay with you the first 24 hrs after your surgery. 6. A parent/legal guardian must accompany a child scheduled for surgery and plan to stay at the hospital until the child is discharged. Please do not bring other children with you. 7. For your comfort,please wear simple, loose fitting clothing to the hospital.  Please do not bring valuables (money, credit cards, checkbooks, etc.) Do not wear any makeup (including no eye makeup) or nail polish on your fingers or toes. 8. For your safety, please DO NOT wear any jewelry or piercings on day of surgery. All body piercing jewelry must be removed. 9. If you have dentures, they will be removed before going to the OR; for your convenience we will provide you with a container.   If you wear contact lenses or glasses, they will be removed, they will be removed, please bring a case for them. 10. If appicable,Please see your family doctor/pediatrician for a history & physical and/or concerning medications. Bring any test results/reports from your physician's office. 11. Remember to bring Blood Bank bracelet to the hospital on the day of surgery. 12. If you have a Living Will and Durable Power of  for Healthcare, please bring in a copy. 15. Notify your Surgeon if you develop any illness between now and surgery  time, cough, cold, fever, sore throat, nausea, vomiting, etc.  Please notify your surgeon if you experience dizziness, shortness of breath or blurred vision between now & the time of your surgery   14. DO NOT shave your operative site 96 hours prior to surgery. For face & neck surgery, men may use an electric razor 48 hours prior to surgery. 15. Shower the night before surgery with ___Antibacterial soap ___Hibiclens   16. To provide excellent care visitors will be limited to one in the room at any given time. 17.  Please bring picture ID and insurance card. 18.  Visit our web site for additional information:  [facility]. [facility]/surgery.

## 2022-03-18 NOTE — PROGRESS NOTES
Obstructive Sleep Apnea (DAVID) Screening     Patient:  Nikolas Nicole    YOB: 1950      Medical Record #:  [id number]                     Date:  3/18/2022     1. Are you a loud and/or regular snorer? [x]  Yes       [] No    2. Have you been observed to gasp or stop breathing during sleep? [x]  Yes       [] No    3. Do you feel tired or groggy upon awakening or do you awaken with a headache?           []  Yes       [] No    4. Are you often tired or fatigued during the wake time hours? []  Yes       [] No    5. Do you fall asleep sitting, reading, watching TV or driving? []  Yes       [] No    6. Do you often have problems with memory or concentration? []  Yes       [] No    If patient's DAVID score if greater than or equal to 3, they are considered high risk for DAVID. An anesthesia provider will evaluate the patient and develop a plan of care the day of surgery. Note:  If the patient's BMI is more than 35 kg m¯² , has neck circumference > 40 cm, and/or high blood pressure the risk is greater (© American [employer]nea Association, 2006).

## 2022-03-21 ENCOUNTER — HOSPITAL ENCOUNTER (OUTPATIENT)
Dept: GENERAL RADIOLOGY | Age: 72
Discharge: HOME | End: 2022-03-21
Payer: COMMERCIAL

## 2022-03-21 ENCOUNTER — HOSPITAL ENCOUNTER (OUTPATIENT)
Dept: PREADMISSION TESTING | Age: 72
Discharge: HOME | End: 2022-03-25
Payer: COMMERCIAL

## 2022-03-21 DIAGNOSIS — Z01.811 PRE-OP CHEST EXAM: ICD-10-CM

## 2022-03-21 LAB
ABO/RH: NORMAL
ANION GAP SERPL CALCULATED.3IONS-SCNC: 14 MMOL/L (ref 3–16)
ANTIBODY SCREEN: NORMAL
APTT: 32.4 SEC (ref 26.2–38.6)
BASOPHILS ABSOLUTE: 0 K/UL (ref 0–0.2)
BASOPHILS RELATIVE PERCENT: 0.9 %
BILIRUBIN URINE: NEGATIVE
BLOOD, URINE: NEGATIVE
BUN BLDV-MCNC: 17 MG/DL (ref 7–20)
CALCIUM SERPL-MCNC: 9.7 MG/DL (ref 8.3–10.6)
CHLORIDE BLD-SCNC: 103 MMOL/L (ref 99–110)
CLARITY: CLEAR
CO2: 24 MMOL/L (ref 21–32)
COLOR: YELLOW
CREAT SERPL-MCNC: 0.9 MG/DL (ref 0.8–1.3)
EOSINOPHILS ABSOLUTE: 0.3 K/UL (ref 0–0.6)
EOSINOPHILS RELATIVE PERCENT: 6.2 %
GFR AFRICAN AMERICAN: >60
GFR NON-AFRICAN AMERICAN: >60
GLUCOSE BLD-MCNC: 111 MG/DL (ref 70–99)
GLUCOSE URINE: NEGATIVE MG/DL
HCT VFR BLD CALC: 43.4 % (ref 40.5–52.5)
HEMOGLOBIN: 14.7 G/DL (ref 13.5–17.5)
INR BLD: 0.98 (ref 0.88–1.12)
KETONES, URINE: NEGATIVE MG/DL
LEUKOCYTE ESTERASE, URINE: NEGATIVE
LYMPHOCYTES ABSOLUTE: 1 K/UL (ref 1–5.1)
LYMPHOCYTES RELATIVE PERCENT: 20.9 %
MCH RBC QN AUTO: 30.5 PG (ref 26–34)
MCHC RBC AUTO-ENTMCNC: 33.8 G/DL (ref 31–36)
MCV RBC AUTO: 90.2 FL (ref 80–100)
MICROSCOPIC EXAMINATION: NORMAL
MONOCYTES ABSOLUTE: 0.6 K/UL (ref 0–1.3)
MONOCYTES RELATIVE PERCENT: 12.8 %
NEUTROPHILS ABSOLUTE: 3 K/UL (ref 1.7–7.7)
NEUTROPHILS RELATIVE PERCENT: 59.2 %
NITRITE, URINE: NEGATIVE
PDW BLD-RTO: 13.6 % (ref 12.4–15.4)
PH UA: 8 (ref 5–8)
PLATELET # BLD: 210 K/UL (ref 135–450)
PMV BLD AUTO: 6 FL (ref 5–10.5)
POTASSIUM SERPL-SCNC: 5 MMOL/L (ref 3.5–5.1)
PROTEIN UA: NEGATIVE MG/DL
PROTHROMBIN TIME: 11.1 SEC (ref 9.9–12.7)
RBC # BLD: 4.81 M/UL (ref 4.2–5.9)
SODIUM BLD-SCNC: 141 MMOL/L (ref 136–145)
SPECIFIC GRAVITY UA: 1.01 (ref 1–1.03)
URINE REFLEX TO CULTURE: NORMAL
URINE TYPE: NORMAL
UROBILINOGEN, URINE: 0.2 E.U./DL
WBC # BLD: 5 K/UL (ref 4–11)

## 2022-03-21 PROCEDURE — 93005 ELECTROCARDIOGRAM TRACING: CPT | Performed by: THORACIC SURGERY (CARDIOTHORACIC VASCULAR SURGERY)

## 2022-03-21 PROCEDURE — U0003 INFECTIOUS AGENT DETECTION BY NUCLEIC ACID (DNA OR RNA); SEVERE ACUTE RESPIRATORY SYNDROME CORONAVIRUS 2 (SARS-COV-2) (CORONAVIRUS DISEASE [COVID-19]), AMPLIFIED PROBE TECHNIQUE, MAKING USE OF HIGH THROUGHPUT TECHNOLOGIES AS DESCRIBED BY CMS-2020-01-R: HCPCS

## 2022-03-21 PROCEDURE — 71046 X-RAY EXAM CHEST 2 VIEWS: CPT

## 2022-03-21 PROCEDURE — 36415 COLL VENOUS BLD VENIPUNCTURE: CPT

## 2022-03-21 PROCEDURE — 86901 BLOOD TYPING SEROLOGIC RH(D): CPT

## 2022-03-21 PROCEDURE — 80048 BASIC METABOLIC PNL TOTAL CA: CPT

## 2022-03-21 PROCEDURE — 86900 BLOOD TYPING SEROLOGIC ABO: CPT

## 2022-03-21 PROCEDURE — 85025 COMPLETE CBC W/AUTO DIFF WBC: CPT

## 2022-03-21 PROCEDURE — 86850 RBC ANTIBODY SCREEN: CPT

## 2022-03-21 PROCEDURE — 81003 URINALYSIS AUTO W/O SCOPE: CPT

## 2022-03-21 PROCEDURE — U0005 INFEC AGEN DETEC AMPLI PROBE: HCPCS

## 2022-03-21 PROCEDURE — 85730 THROMBOPLASTIN TIME PARTIAL: CPT

## 2022-03-21 PROCEDURE — 85610 PROTHROMBIN TIME: CPT

## 2022-03-22 LAB
EKG ATRIAL RATE: 69 BPM
EKG DIAGNOSIS: NORMAL
EKG P AXIS: 65 DEGREES
EKG P-R INTERVAL: 178 MS
EKG Q-T INTERVAL: 410 MS
EKG QRS DURATION: 100 MS
EKG QTC CALCULATION (BAZETT): 439 MS
EKG R AXIS: -15 DEGREES
EKG T AXIS: 16 DEGREES
EKG VENTRICULAR RATE: 69 BPM
SARS-COV-2: NOT DETECTED

## 2022-03-22 PROCEDURE — 93010 ELECTROCARDIOGRAM REPORT: CPT | Performed by: INTERNAL MEDICINE

## 2022-03-25 ENCOUNTER — HOSPITAL ENCOUNTER (INPATIENT)
Age: 72
LOS: 6 days | Discharge: HOME HEALTH | DRG: 327 | End: 2022-03-31
Attending: THORACIC SURGERY (CARDIOTHORACIC VASCULAR SURGERY) | Admitting: THORACIC SURGERY (CARDIOTHORACIC VASCULAR SURGERY)
Payer: COMMERCIAL

## 2022-03-25 ENCOUNTER — APPOINTMENT (OUTPATIENT)
Dept: GENERAL RADIOLOGY | Age: 72
DRG: 327 | End: 2022-03-25
Attending: THORACIC SURGERY (CARDIOTHORACIC VASCULAR SURGERY)
Payer: COMMERCIAL

## 2022-03-25 ENCOUNTER — ANESTHESIA (OUTPATIENT)
Dept: OPERATING ROOM | Age: 72
DRG: 327 | End: 2022-03-25
Payer: COMMERCIAL

## 2022-03-25 VITALS — DIASTOLIC BLOOD PRESSURE: 61 MMHG | OXYGEN SATURATION: 100 % | TEMPERATURE: 96.4 F | SYSTOLIC BLOOD PRESSURE: 83 MMHG

## 2022-03-25 DIAGNOSIS — C15.9 MALIGNANT NEOPLASM OF ESOPHAGUS, UNSPECIFIED LOCATION (HCC): ICD-10-CM

## 2022-03-25 DIAGNOSIS — G89.18 ACUTE POST-OPERATIVE PAIN: Primary | ICD-10-CM

## 2022-03-25 LAB
ANION GAP SERPL CALCULATED.3IONS-SCNC: 12 MMOL/L (ref 3–16)
BUN BLDV-MCNC: 19 MG/DL (ref 7–20)
CALCIUM SERPL-MCNC: 8.8 MG/DL (ref 8.3–10.6)
CHLORIDE BLD-SCNC: 99 MMOL/L (ref 99–110)
CO2: 22 MMOL/L (ref 21–32)
CREAT SERPL-MCNC: 0.9 MG/DL (ref 0.8–1.3)
GFR AFRICAN AMERICAN: >60
GFR NON-AFRICAN AMERICAN: >60
GLUCOSE BLD-MCNC: 158 MG/DL (ref 70–99)
HCT VFR BLD CALC: 41 % (ref 40.5–52.5)
HEMOGLOBIN: 14.1 G/DL (ref 13.5–17.5)
MAGNESIUM: 1.6 MG/DL (ref 1.8–2.4)
MCH RBC QN AUTO: 30.6 PG (ref 26–34)
MCHC RBC AUTO-ENTMCNC: 34.3 G/DL (ref 31–36)
MCV RBC AUTO: 89.1 FL (ref 80–100)
PDW BLD-RTO: 13.4 % (ref 12.4–15.4)
PHOSPHORUS: 3.5 MG/DL (ref 2.5–4.9)
PLATELET # BLD: 207 K/UL (ref 135–450)
PMV BLD AUTO: 5.5 FL (ref 5–10.5)
POTASSIUM SERPL-SCNC: 3.9 MMOL/L (ref 3.5–5.1)
RBC # BLD: 4.61 M/UL (ref 4.2–5.9)
SODIUM BLD-SCNC: 133 MMOL/L (ref 136–145)
WBC # BLD: 10 K/UL (ref 4–11)

## 2022-03-25 PROCEDURE — 38746 REMOVE THORACIC LYMPH NODES: CPT | Performed by: THORACIC SURGERY (CARDIOTHORACIC VASCULAR SURGERY)

## 2022-03-25 PROCEDURE — 2580000003 HC RX 258: Performed by: NURSE PRACTITIONER

## 2022-03-25 PROCEDURE — 43117 PARTIAL REMOVAL OF ESOPHAGUS: CPT | Performed by: THORACIC SURGERY (CARDIOTHORACIC VASCULAR SURGERY)

## 2022-03-25 PROCEDURE — 2500000003 HC RX 250 WO HCPCS

## 2022-03-25 PROCEDURE — C2618 PROBE/NEEDLE, CRYO: HCPCS | Performed by: THORACIC SURGERY (CARDIOTHORACIC VASCULAR SURGERY)

## 2022-03-25 PROCEDURE — 2580000003 HC RX 258: Performed by: NURSE ANESTHETIST, CERTIFIED REGISTERED

## 2022-03-25 PROCEDURE — 94761 N-INVAS EAR/PLS OXIMETRY MLT: CPT

## 2022-03-25 PROCEDURE — 0DT30ZZ RESECTION OF LOWER ESOPHAGUS, OPEN APPROACH: ICD-10-PCS | Performed by: THORACIC SURGERY (CARDIOTHORACIC VASCULAR SURGERY)

## 2022-03-25 PROCEDURE — 84100 ASSAY OF PHOSPHORUS: CPT

## 2022-03-25 PROCEDURE — 2500000003 HC RX 250 WO HCPCS: Performed by: THORACIC SURGERY (CARDIOTHORACIC VASCULAR SURGERY)

## 2022-03-25 PROCEDURE — 88307 TISSUE EXAM BY PATHOLOGIST: CPT

## 2022-03-25 PROCEDURE — 6360000002 HC RX W HCPCS: Performed by: THORACIC SURGERY (CARDIOTHORACIC VASCULAR SURGERY)

## 2022-03-25 PROCEDURE — 2000000000 HC ICU R&B

## 2022-03-25 PROCEDURE — 2709999900 HC NON-CHARGEABLE SUPPLY: Performed by: THORACIC SURGERY (CARDIOTHORACIC VASCULAR SURGERY)

## 2022-03-25 PROCEDURE — 6360000002 HC RX W HCPCS: Performed by: NURSE ANESTHETIST, CERTIFIED REGISTERED

## 2022-03-25 PROCEDURE — 88332 PATH CONSLTJ SURG EA ADD BLK: CPT

## 2022-03-25 PROCEDURE — 71045 X-RAY EXAM CHEST 1 VIEW: CPT

## 2022-03-25 PROCEDURE — 3700000000 HC ANESTHESIA ATTENDED CARE: Performed by: THORACIC SURGERY (CARDIOTHORACIC VASCULAR SURGERY)

## 2022-03-25 PROCEDURE — 0DT40ZZ RESECTION OF ESOPHAGOGASTRIC JUNCTION, OPEN APPROACH: ICD-10-PCS | Performed by: THORACIC SURGERY (CARDIOTHORACIC VASCULAR SURGERY)

## 2022-03-25 PROCEDURE — 94669 MECHANICAL CHEST WALL OSCILL: CPT

## 2022-03-25 PROCEDURE — C1729 CATH, DRAINAGE: HCPCS | Performed by: THORACIC SURGERY (CARDIOTHORACIC VASCULAR SURGERY)

## 2022-03-25 PROCEDURE — 36592 COLLECT BLOOD FROM PICC: CPT

## 2022-03-25 PROCEDURE — 38746 REMOVE THORACIC LYMPH NODES: CPT

## 2022-03-25 PROCEDURE — 2700000000 HC OXYGEN THERAPY PER DAY

## 2022-03-25 PROCEDURE — 85027 COMPLETE CBC AUTOMATED: CPT

## 2022-03-25 PROCEDURE — 6360000002 HC RX W HCPCS

## 2022-03-25 PROCEDURE — 43117 PARTIAL REMOVAL OF ESOPHAGUS: CPT

## 2022-03-25 PROCEDURE — 2580000003 HC RX 258: Performed by: ANESTHESIOLOGY

## 2022-03-25 PROCEDURE — A4216 STERILE WATER/SALINE, 10 ML: HCPCS | Performed by: THORACIC SURGERY (CARDIOTHORACIC VASCULAR SURGERY)

## 2022-03-25 PROCEDURE — C9290 INJ, BUPIVACAINE LIPOSOME: HCPCS | Performed by: THORACIC SURGERY (CARDIOTHORACIC VASCULAR SURGERY)

## 2022-03-25 PROCEDURE — 36620 INSERTION CATHETER ARTERY: CPT

## 2022-03-25 PROCEDURE — 2500000003 HC RX 250 WO HCPCS: Performed by: NURSE PRACTITIONER

## 2022-03-25 PROCEDURE — 94640 AIRWAY INHALATION TREATMENT: CPT

## 2022-03-25 PROCEDURE — 2780000010 HC IMPLANT OTHER: Performed by: THORACIC SURGERY (CARDIOTHORACIC VASCULAR SURGERY)

## 2022-03-25 PROCEDURE — 88312 SPECIAL STAINS GROUP 1: CPT

## 2022-03-25 PROCEDURE — 80048 BASIC METABOLIC PNL TOTAL CA: CPT

## 2022-03-25 PROCEDURE — 3600000008 HC SURGERY OHS BASE: Performed by: THORACIC SURGERY (CARDIOTHORACIC VASCULAR SURGERY)

## 2022-03-25 PROCEDURE — 83735 ASSAY OF MAGNESIUM: CPT

## 2022-03-25 PROCEDURE — 2580000003 HC RX 258: Performed by: THORACIC SURGERY (CARDIOTHORACIC VASCULAR SURGERY)

## 2022-03-25 PROCEDURE — 88309 TISSUE EXAM BY PATHOLOGIST: CPT

## 2022-03-25 PROCEDURE — 88331 PATH CONSLTJ SURG 1 BLK 1SPC: CPT

## 2022-03-25 PROCEDURE — 2500000003 HC RX 250 WO HCPCS: Performed by: NURSE ANESTHETIST, CERTIFIED REGISTERED

## 2022-03-25 PROCEDURE — 2720000010 HC SURG SUPPLY STERILE: Performed by: THORACIC SURGERY (CARDIOTHORACIC VASCULAR SURGERY)

## 2022-03-25 PROCEDURE — 88305 TISSUE EXAM BY PATHOLOGIST: CPT

## 2022-03-25 PROCEDURE — P9045 ALBUMIN (HUMAN), 5%, 250 ML: HCPCS | Performed by: NURSE ANESTHETIST, CERTIFIED REGISTERED

## 2022-03-25 PROCEDURE — 0DB60ZZ EXCISION OF STOMACH, OPEN APPROACH: ICD-10-PCS | Performed by: THORACIC SURGERY (CARDIOTHORACIC VASCULAR SURGERY)

## 2022-03-25 PROCEDURE — 07BB0ZZ EXCISION OF MESENTERIC LYMPHATIC, OPEN APPROACH: ICD-10-PCS | Performed by: THORACIC SURGERY (CARDIOTHORACIC VASCULAR SURGERY)

## 2022-03-25 PROCEDURE — 3600000018 HC SURGERY OHS ADDTL 15MIN: Performed by: THORACIC SURGERY (CARDIOTHORACIC VASCULAR SURGERY)

## 2022-03-25 PROCEDURE — 3700000001 HC ADD 15 MINUTES (ANESTHESIA): Performed by: THORACIC SURGERY (CARDIOTHORACIC VASCULAR SURGERY)

## 2022-03-25 RX ORDER — SODIUM CHLORIDE, SODIUM LACTATE, POTASSIUM CHLORIDE, CALCIUM CHLORIDE 600; 310; 30; 20 MG/100ML; MG/100ML; MG/100ML; MG/100ML
INJECTION, SOLUTION INTRAVENOUS CONTINUOUS PRN
Status: DISCONTINUED | OUTPATIENT
Start: 2022-03-25 | End: 2022-03-25 | Stop reason: SDUPTHER

## 2022-03-25 RX ORDER — MIDAZOLAM HYDROCHLORIDE 1 MG/ML
INJECTION INTRAMUSCULAR; INTRAVENOUS PRN
Status: DISCONTINUED | OUTPATIENT
Start: 2022-03-25 | End: 2022-03-25 | Stop reason: SDUPTHER

## 2022-03-25 RX ORDER — DEXAMETHASONE SODIUM PHOSPHATE 4 MG/ML
INJECTION, SOLUTION INTRA-ARTICULAR; INTRALESIONAL; INTRAMUSCULAR; INTRAVENOUS; SOFT TISSUE PRN
Status: DISCONTINUED | OUTPATIENT
Start: 2022-03-25 | End: 2022-03-25 | Stop reason: SDUPTHER

## 2022-03-25 RX ORDER — SODIUM CHLORIDE 0.9 % (FLUSH) 0.9 %
5-40 SYRINGE (ML) INJECTION EVERY 12 HOURS SCHEDULED
Status: DISCONTINUED | OUTPATIENT
Start: 2022-03-25 | End: 2022-03-31 | Stop reason: HOSPADM

## 2022-03-25 RX ORDER — ONDANSETRON 2 MG/ML
INJECTION INTRAMUSCULAR; INTRAVENOUS PRN
Status: DISCONTINUED | OUTPATIENT
Start: 2022-03-25 | End: 2022-03-25 | Stop reason: SDUPTHER

## 2022-03-25 RX ORDER — FAMOTIDINE 20 MG/1
20 TABLET, FILM COATED ORAL 2 TIMES DAILY
Status: DISCONTINUED | OUTPATIENT
Start: 2022-03-25 | End: 2022-03-28

## 2022-03-25 RX ORDER — MORPHINE SULFATE 4 MG/ML
4 INJECTION, SOLUTION INTRAMUSCULAR; INTRAVENOUS
Status: DISCONTINUED | OUTPATIENT
Start: 2022-03-25 | End: 2022-03-31 | Stop reason: HOSPADM

## 2022-03-25 RX ORDER — HYDRALAZINE HYDROCHLORIDE 20 MG/ML
5 INJECTION INTRAMUSCULAR; INTRAVENOUS ONCE
Status: DISCONTINUED | OUTPATIENT
Start: 2022-03-25 | End: 2022-03-28

## 2022-03-25 RX ORDER — KETAMINE HYDROCHLORIDE 100 MG/ML
INJECTION, SOLUTION INTRAMUSCULAR; INTRAVENOUS PRN
Status: DISCONTINUED | OUTPATIENT
Start: 2022-03-25 | End: 2022-03-25 | Stop reason: SDUPTHER

## 2022-03-25 RX ORDER — EPHEDRINE SULFATE 50 MG/ML
INJECTION INTRAVENOUS PRN
Status: DISCONTINUED | OUTPATIENT
Start: 2022-03-25 | End: 2022-03-25 | Stop reason: SDUPTHER

## 2022-03-25 RX ORDER — SODIUM CHLORIDE 0.9 % (FLUSH) 0.9 %
10 SYRINGE (ML) INJECTION EVERY 12 HOURS SCHEDULED
Status: DISCONTINUED | OUTPATIENT
Start: 2022-03-25 | End: 2022-03-25

## 2022-03-25 RX ORDER — SODIUM CHLORIDE 0.9 % (FLUSH) 0.9 %
5-40 SYRINGE (ML) INJECTION PRN
Status: DISCONTINUED | OUTPATIENT
Start: 2022-03-25 | End: 2022-03-31 | Stop reason: HOSPADM

## 2022-03-25 RX ORDER — SODIUM CHLORIDE 9 MG/ML
25 INJECTION, SOLUTION INTRAVENOUS PRN
Status: DISCONTINUED | OUTPATIENT
Start: 2022-03-25 | End: 2022-03-25

## 2022-03-25 RX ORDER — HYDROMORPHONE HCL 110MG/55ML
PATIENT CONTROLLED ANALGESIA SYRINGE INTRAVENOUS PRN
Status: DISCONTINUED | OUTPATIENT
Start: 2022-03-25 | End: 2022-03-25 | Stop reason: SDUPTHER

## 2022-03-25 RX ORDER — LIDOCAINE HYDROCHLORIDE 10 MG/ML
1 INJECTION, SOLUTION EPIDURAL; INFILTRATION; INTRACAUDAL; PERINEURAL
Status: DISCONTINUED | OUTPATIENT
Start: 2022-03-25 | End: 2022-03-25

## 2022-03-25 RX ORDER — MORPHINE SULFATE 2 MG/ML
2 INJECTION, SOLUTION INTRAMUSCULAR; INTRAVENOUS
Status: DISCONTINUED | OUTPATIENT
Start: 2022-03-25 | End: 2022-03-31 | Stop reason: HOSPADM

## 2022-03-25 RX ORDER — ONDANSETRON 2 MG/ML
4 INJECTION INTRAMUSCULAR; INTRAVENOUS
Status: DISCONTINUED | OUTPATIENT
Start: 2022-03-25 | End: 2022-03-25

## 2022-03-25 RX ORDER — ALBUTEROL SULFATE 2.5 MG/3ML
2.5 SOLUTION RESPIRATORY (INHALATION) 4 TIMES DAILY
Status: DISCONTINUED | OUTPATIENT
Start: 2022-03-25 | End: 2022-03-31 | Stop reason: HOSPADM

## 2022-03-25 RX ORDER — SODIUM CHLORIDE 0.9 % (FLUSH) 0.9 %
5-40 SYRINGE (ML) INJECTION EVERY 12 HOURS SCHEDULED
Status: DISCONTINUED | OUTPATIENT
Start: 2022-03-25 | End: 2022-03-25

## 2022-03-25 RX ORDER — SODIUM CHLORIDE 0.9 % (FLUSH) 0.9 %
5-40 SYRINGE (ML) INJECTION PRN
Status: DISCONTINUED | OUTPATIENT
Start: 2022-03-25 | End: 2022-03-25

## 2022-03-25 RX ORDER — SODIUM CHLORIDE 0.9 % (FLUSH) 0.9 %
10 SYRINGE (ML) INJECTION PRN
Status: DISCONTINUED | OUTPATIENT
Start: 2022-03-25 | End: 2022-03-25

## 2022-03-25 RX ORDER — PROPOFOL 10 MG/ML
INJECTION, EMULSION INTRAVENOUS PRN
Status: DISCONTINUED | OUTPATIENT
Start: 2022-03-25 | End: 2022-03-25 | Stop reason: SDUPTHER

## 2022-03-25 RX ORDER — SODIUM CHLORIDE, SODIUM LACTATE, POTASSIUM CHLORIDE, CALCIUM CHLORIDE 600; 310; 30; 20 MG/100ML; MG/100ML; MG/100ML; MG/100ML
INJECTION, SOLUTION INTRAVENOUS CONTINUOUS
Status: DISCONTINUED | OUTPATIENT
Start: 2022-03-25 | End: 2022-03-25

## 2022-03-25 RX ORDER — LIDOCAINE HYDROCHLORIDE 20 MG/ML
INJECTION, SOLUTION INFILTRATION; PERINEURAL PRN
Status: DISCONTINUED | OUTPATIENT
Start: 2022-03-25 | End: 2022-03-25 | Stop reason: SDUPTHER

## 2022-03-25 RX ORDER — HYDRALAZINE HYDROCHLORIDE 20 MG/ML
INJECTION INTRAMUSCULAR; INTRAVENOUS
Status: COMPLETED
Start: 2022-03-25 | End: 2022-03-25

## 2022-03-25 RX ORDER — MEPERIDINE HYDROCHLORIDE 50 MG/ML
12.5 INJECTION INTRAMUSCULAR; INTRAVENOUS; SUBCUTANEOUS EVERY 5 MIN PRN
Status: DISCONTINUED | OUTPATIENT
Start: 2022-03-25 | End: 2022-03-25

## 2022-03-25 RX ORDER — ALBUMIN, HUMAN INJ 5% 5 %
SOLUTION INTRAVENOUS PRN
Status: DISCONTINUED | OUTPATIENT
Start: 2022-03-25 | End: 2022-03-25 | Stop reason: SDUPTHER

## 2022-03-25 RX ORDER — GINSENG 100 MG
CAPSULE ORAL DAILY
Status: DISCONTINUED | OUTPATIENT
Start: 2022-03-25 | End: 2022-03-31 | Stop reason: HOSPADM

## 2022-03-25 RX ORDER — DEXTROSE, SODIUM CHLORIDE, AND POTASSIUM CHLORIDE 5; .45; .15 G/100ML; G/100ML; G/100ML
INJECTION INTRAVENOUS CONTINUOUS
Status: DISCONTINUED | OUTPATIENT
Start: 2022-03-25 | End: 2022-03-29

## 2022-03-25 RX ORDER — FENTANYL CITRATE 50 UG/ML
INJECTION, SOLUTION INTRAMUSCULAR; INTRAVENOUS PRN
Status: DISCONTINUED | OUTPATIENT
Start: 2022-03-25 | End: 2022-03-25 | Stop reason: SDUPTHER

## 2022-03-25 RX ORDER — ROCURONIUM BROMIDE 10 MG/ML
INJECTION, SOLUTION INTRAVENOUS PRN
Status: DISCONTINUED | OUTPATIENT
Start: 2022-03-25 | End: 2022-03-25 | Stop reason: SDUPTHER

## 2022-03-25 RX ORDER — SODIUM CHLORIDE 9 MG/ML
25 INJECTION, SOLUTION INTRAVENOUS PRN
Status: DISCONTINUED | OUTPATIENT
Start: 2022-03-25 | End: 2022-03-31 | Stop reason: HOSPADM

## 2022-03-25 RX ORDER — ONDANSETRON 2 MG/ML
4 INJECTION INTRAMUSCULAR; INTRAVENOUS EVERY 6 HOURS PRN
Status: DISCONTINUED | OUTPATIENT
Start: 2022-03-25 | End: 2022-03-31 | Stop reason: HOSPADM

## 2022-03-25 RX ADMIN — HYDRALAZINE HYDROCHLORIDE 5 MG: 20 INJECTION INTRAMUSCULAR; INTRAVENOUS at 16:21

## 2022-03-25 RX ADMIN — DEXMEDETOMIDINE HYDROCHLORIDE 4 MCG: 100 INJECTION, SOLUTION INTRAVENOUS at 12:54

## 2022-03-25 RX ADMIN — HYDROMORPHONE HYDROCHLORIDE 0.2 MG: 2 INJECTION INTRAMUSCULAR; INTRAVENOUS; SUBCUTANEOUS at 13:28

## 2022-03-25 RX ADMIN — KETAMINE HYDROCHLORIDE 20 MG: 100 INJECTION INTRAMUSCULAR; INTRAVENOUS at 08:21

## 2022-03-25 RX ADMIN — FAMOTIDINE 20 MG: 10 INJECTION, SOLUTION INTRAVENOUS at 06:46

## 2022-03-25 RX ADMIN — EPHEDRINE SULFATE 5 MG: 50 INJECTION INTRAVENOUS at 08:17

## 2022-03-25 RX ADMIN — DEXMEDETOMIDINE HYDROCHLORIDE 4 MCG: 100 INJECTION, SOLUTION INTRAVENOUS at 13:15

## 2022-03-25 RX ADMIN — DEXTROSE MONOHYDRATE 10 MG/HR: 50 INJECTION, SOLUTION INTRAVENOUS at 21:15

## 2022-03-25 RX ADMIN — DEXMEDETOMIDINE HYDROCHLORIDE 4 MCG: 100 INJECTION, SOLUTION INTRAVENOUS at 12:27

## 2022-03-25 RX ADMIN — SODIUM CHLORIDE, SODIUM LACTATE, POTASSIUM CHLORIDE, AND CALCIUM CHLORIDE: .6; .31; .03; .02 INJECTION, SOLUTION INTRAVENOUS at 07:35

## 2022-03-25 RX ADMIN — DEXMEDETOMIDINE HYDROCHLORIDE 4 MCG: 100 INJECTION, SOLUTION INTRAVENOUS at 13:01

## 2022-03-25 RX ADMIN — DEXMEDETOMIDINE HYDROCHLORIDE 4 MCG: 100 INJECTION, SOLUTION INTRAVENOUS at 13:23

## 2022-03-25 RX ADMIN — POTASSIUM CHLORIDE, DEXTROSE MONOHYDRATE AND SODIUM CHLORIDE: 150; 5; 450 INJECTION, SOLUTION INTRAVENOUS at 14:47

## 2022-03-25 RX ADMIN — HYDROMORPHONE HYDROCHLORIDE 0.2 MG: 2 INJECTION INTRAMUSCULAR; INTRAVENOUS; SUBCUTANEOUS at 13:02

## 2022-03-25 RX ADMIN — DEXAMETHASONE SODIUM PHOSPHATE 8 MG: 4 INJECTION, SOLUTION INTRAMUSCULAR; INTRAVENOUS at 07:36

## 2022-03-25 RX ADMIN — ROCURONIUM BROMIDE 10 MG: 10 SOLUTION INTRAVENOUS at 09:58

## 2022-03-25 RX ADMIN — EPHEDRINE SULFATE 5 MG: 50 INJECTION INTRAVENOUS at 11:06

## 2022-03-25 RX ADMIN — MORPHINE SULFATE 4 MG: 4 INJECTION, SOLUTION INTRAMUSCULAR; INTRAVENOUS at 18:12

## 2022-03-25 RX ADMIN — FENTANYL CITRATE 50 MCG: 50 INJECTION INTRAMUSCULAR; INTRAVENOUS at 07:36

## 2022-03-25 RX ADMIN — MORPHINE SULFATE 4 MG: 4 INJECTION, SOLUTION INTRAMUSCULAR; INTRAVENOUS at 14:47

## 2022-03-25 RX ADMIN — KETAMINE HYDROCHLORIDE 10 MG: 100 INJECTION INTRAMUSCULAR; INTRAVENOUS at 10:37

## 2022-03-25 RX ADMIN — ROCURONIUM BROMIDE 10 MG: 10 SOLUTION INTRAVENOUS at 09:01

## 2022-03-25 RX ADMIN — KETAMINE HYDROCHLORIDE 40 MG: 100 INJECTION INTRAMUSCULAR; INTRAVENOUS at 10:09

## 2022-03-25 RX ADMIN — DEXMEDETOMIDINE HYDROCHLORIDE 4 MCG: 100 INJECTION, SOLUTION INTRAVENOUS at 12:48

## 2022-03-25 RX ADMIN — PROPOFOL 150 MG: 10 INJECTION, EMULSION INTRAVENOUS at 07:36

## 2022-03-25 RX ADMIN — ROCURONIUM BROMIDE 50 MG: 10 SOLUTION INTRAVENOUS at 07:36

## 2022-03-25 RX ADMIN — HYDROMORPHONE HYDROCHLORIDE 0.2 MG: 2 INJECTION INTRAMUSCULAR; INTRAVENOUS; SUBCUTANEOUS at 12:30

## 2022-03-25 RX ADMIN — CEFAZOLIN SODIUM 2 G: 10 INJECTION, POWDER, FOR SOLUTION INTRAVENOUS at 10:39

## 2022-03-25 RX ADMIN — PHENYLEPHRINE HYDROCHLORIDE 40 MCG: 10 INJECTION INTRAVENOUS at 07:46

## 2022-03-25 RX ADMIN — KETAMINE HYDROCHLORIDE 10 MG: 100 INJECTION INTRAMUSCULAR; INTRAVENOUS at 09:26

## 2022-03-25 RX ADMIN — MORPHINE SULFATE 4 MG: 4 INJECTION, SOLUTION INTRAMUSCULAR; INTRAVENOUS at 20:37

## 2022-03-25 RX ADMIN — ROCURONIUM BROMIDE 10 MG: 10 SOLUTION INTRAVENOUS at 11:32

## 2022-03-25 RX ADMIN — FAMOTIDINE 20 MG: 10 INJECTION, SOLUTION INTRAVENOUS at 20:38

## 2022-03-25 RX ADMIN — KETAMINE HYDROCHLORIDE 20 MG: 100 INJECTION INTRAMUSCULAR; INTRAVENOUS at 08:26

## 2022-03-25 RX ADMIN — EPHEDRINE SULFATE 5 MG: 50 INJECTION INTRAVENOUS at 08:03

## 2022-03-25 RX ADMIN — ONDANSETRON 4 MG: 2 INJECTION INTRAMUSCULAR; INTRAVENOUS at 07:36

## 2022-03-25 RX ADMIN — SODIUM CHLORIDE, POTASSIUM CHLORIDE, SODIUM LACTATE AND CALCIUM CHLORIDE: 600; 310; 30; 20 INJECTION, SOLUTION INTRAVENOUS at 06:46

## 2022-03-25 RX ADMIN — MIDAZOLAM HYDROCHLORIDE 2 MG: 2 INJECTION, SOLUTION INTRAMUSCULAR; INTRAVENOUS at 07:33

## 2022-03-25 RX ADMIN — HYDROMORPHONE HYDROCHLORIDE 0.2 MG: 2 INJECTION INTRAMUSCULAR; INTRAVENOUS; SUBCUTANEOUS at 13:10

## 2022-03-25 RX ADMIN — MORPHINE SULFATE 4 MG: 4 INJECTION, SOLUTION INTRAMUSCULAR; INTRAVENOUS at 16:20

## 2022-03-25 RX ADMIN — ALBUMIN (HUMAN) 250 ML: 12.5 INJECTION, SOLUTION INTRAVENOUS at 08:17

## 2022-03-25 RX ADMIN — DEXTROSE MONOHYDRATE 5 MG/HR: 50 INJECTION, SOLUTION INTRAVENOUS at 16:58

## 2022-03-25 RX ADMIN — HYDROMORPHONE HYDROCHLORIDE 0.2 MG: 2 INJECTION INTRAMUSCULAR; INTRAVENOUS; SUBCUTANEOUS at 12:55

## 2022-03-25 RX ADMIN — ROCURONIUM BROMIDE 20 MG: 10 SOLUTION INTRAVENOUS at 08:40

## 2022-03-25 RX ADMIN — HYDROMORPHONE HYDROCHLORIDE 0.2 MG: 2 INJECTION INTRAMUSCULAR; INTRAVENOUS; SUBCUTANEOUS at 13:20

## 2022-03-25 RX ADMIN — SODIUM CHLORIDE, PRESERVATIVE FREE 10 ML: 5 INJECTION INTRAVENOUS at 20:39

## 2022-03-25 RX ADMIN — SUGAMMADEX 400 MG: 100 INJECTION, SOLUTION INTRAVENOUS at 12:53

## 2022-03-25 RX ADMIN — CEFAZOLIN SODIUM 2 G: 10 INJECTION, POWDER, FOR SOLUTION INTRAVENOUS at 07:49

## 2022-03-25 RX ADMIN — LIDOCAINE HYDROCHLORIDE 100 MG: 20 INJECTION, SOLUTION INFILTRATION; PERINEURAL at 07:36

## 2022-03-25 RX ADMIN — ROCURONIUM BROMIDE 20 MG: 10 SOLUTION INTRAVENOUS at 08:08

## 2022-03-25 RX ADMIN — ROCURONIUM BROMIDE 10 MG: 10 SOLUTION INTRAVENOUS at 10:13

## 2022-03-25 RX ADMIN — ALBUTEROL SULFATE 2.5 MG: 2.5 SOLUTION RESPIRATORY (INHALATION) at 19:18

## 2022-03-25 ASSESSMENT — PULMONARY FUNCTION TESTS
PIF_VALUE: 13
PIF_VALUE: 19
PIF_VALUE: 20
PIF_VALUE: 22
PIF_VALUE: 23
PIF_VALUE: 19
PIF_VALUE: 21
PIF_VALUE: 23
PIF_VALUE: 14
PIF_VALUE: 20
PIF_VALUE: 14
PIF_VALUE: 21
PIF_VALUE: 14
PIF_VALUE: 20
PIF_VALUE: 22
PIF_VALUE: 21
PIF_VALUE: 21
PIF_VALUE: 5
PIF_VALUE: 19
PIF_VALUE: 22
PIF_VALUE: 17
PIF_VALUE: 20
PIF_VALUE: 22
PIF_VALUE: 22
PIF_VALUE: 21
PIF_VALUE: 22
PIF_VALUE: 17
PIF_VALUE: 22
PIF_VALUE: 23
PIF_VALUE: 20
PIF_VALUE: 20
PIF_VALUE: 17
PIF_VALUE: 15
PIF_VALUE: 20
PIF_VALUE: 21
PIF_VALUE: 20
PIF_VALUE: 12
PIF_VALUE: 22
PIF_VALUE: 14
PIF_VALUE: 14
PIF_VALUE: 22
PIF_VALUE: 16
PIF_VALUE: 20
PIF_VALUE: 15
PIF_VALUE: 21
PIF_VALUE: 6
PIF_VALUE: 20
PIF_VALUE: 17
PIF_VALUE: 22
PIF_VALUE: 14
PIF_VALUE: 22
PIF_VALUE: 8
PIF_VALUE: 46
PIF_VALUE: 21
PIF_VALUE: 20
PIF_VALUE: 20
PIF_VALUE: 21
PIF_VALUE: 20
PIF_VALUE: 22
PIF_VALUE: 14
PIF_VALUE: 21
PIF_VALUE: 19
PIF_VALUE: 23
PIF_VALUE: 18
PIF_VALUE: 20
PIF_VALUE: 15
PIF_VALUE: 21
PIF_VALUE: 11
PIF_VALUE: 19
PIF_VALUE: 24
PIF_VALUE: 21
PIF_VALUE: 21
PIF_VALUE: 20
PIF_VALUE: 24
PIF_VALUE: 24
PIF_VALUE: 16
PIF_VALUE: 20
PIF_VALUE: 21
PIF_VALUE: 21
PIF_VALUE: 23
PIF_VALUE: 25
PIF_VALUE: 22
PIF_VALUE: 20
PIF_VALUE: 21
PIF_VALUE: 20
PIF_VALUE: 20
PIF_VALUE: 22
PIF_VALUE: 14
PIF_VALUE: 20
PIF_VALUE: 14
PIF_VALUE: 23
PIF_VALUE: 16
PIF_VALUE: 23
PIF_VALUE: 22
PIF_VALUE: 20
PIF_VALUE: 24
PIF_VALUE: 29
PIF_VALUE: 20
PIF_VALUE: 17
PIF_VALUE: 23
PIF_VALUE: 13
PIF_VALUE: 20
PIF_VALUE: 21
PIF_VALUE: 23
PIF_VALUE: 22
PIF_VALUE: 16
PIF_VALUE: 20
PIF_VALUE: 22
PIF_VALUE: 23
PIF_VALUE: 22
PIF_VALUE: 21
PIF_VALUE: 20
PIF_VALUE: 19
PIF_VALUE: 0
PIF_VALUE: 23
PIF_VALUE: 20
PIF_VALUE: 23
PIF_VALUE: 20
PIF_VALUE: 14
PIF_VALUE: 15
PIF_VALUE: 26
PIF_VALUE: 20
PIF_VALUE: 21
PIF_VALUE: 11
PIF_VALUE: 24
PIF_VALUE: 21
PIF_VALUE: 23
PIF_VALUE: 22
PIF_VALUE: 17
PIF_VALUE: 21
PIF_VALUE: 21
PIF_VALUE: 20
PIF_VALUE: 21
PIF_VALUE: 22
PIF_VALUE: 21
PIF_VALUE: 22
PIF_VALUE: 21
PIF_VALUE: 14
PIF_VALUE: 23
PIF_VALUE: 21
PIF_VALUE: 22
PIF_VALUE: 14
PIF_VALUE: 21
PIF_VALUE: 15
PIF_VALUE: 0
PIF_VALUE: 20
PIF_VALUE: 1
PIF_VALUE: 19
PIF_VALUE: 23
PIF_VALUE: 20
PIF_VALUE: 22
PIF_VALUE: 14
PIF_VALUE: 21
PIF_VALUE: 26
PIF_VALUE: 14
PIF_VALUE: 22
PIF_VALUE: 20
PIF_VALUE: 20
PIF_VALUE: 21
PIF_VALUE: 19
PIF_VALUE: 14
PIF_VALUE: 14
PIF_VALUE: 16
PIF_VALUE: 15
PIF_VALUE: 20
PIF_VALUE: 23
PIF_VALUE: 22
PIF_VALUE: 23
PIF_VALUE: 23
PIF_VALUE: 14
PIF_VALUE: 21
PIF_VALUE: 17
PIF_VALUE: 23
PIF_VALUE: 20
PIF_VALUE: 18
PIF_VALUE: 23
PIF_VALUE: 2
PIF_VALUE: 6
PIF_VALUE: 24
PIF_VALUE: 20
PIF_VALUE: 21
PIF_VALUE: 20
PIF_VALUE: 21
PIF_VALUE: 20
PIF_VALUE: 20
PIF_VALUE: 19
PIF_VALUE: 17
PIF_VALUE: 22
PIF_VALUE: 20
PIF_VALUE: 23
PIF_VALUE: 22
PIF_VALUE: 20
PIF_VALUE: 22
PIF_VALUE: 21
PIF_VALUE: 2
PIF_VALUE: 22
PIF_VALUE: 21
PIF_VALUE: 16
PIF_VALUE: 21
PIF_VALUE: 2
PIF_VALUE: 22
PIF_VALUE: 22
PIF_VALUE: 23
PIF_VALUE: 14
PIF_VALUE: 17
PIF_VALUE: 21
PIF_VALUE: 20
PIF_VALUE: 19
PIF_VALUE: 17
PIF_VALUE: 25
PIF_VALUE: 21
PIF_VALUE: 24
PIF_VALUE: 21
PIF_VALUE: 23
PIF_VALUE: 20
PIF_VALUE: 21
PIF_VALUE: 14
PIF_VALUE: 21
PIF_VALUE: 14
PIF_VALUE: 20
PIF_VALUE: 20
PIF_VALUE: 18
PIF_VALUE: 21
PIF_VALUE: 20
PIF_VALUE: 19
PIF_VALUE: 24
PIF_VALUE: 21
PIF_VALUE: 19
PIF_VALUE: 21
PIF_VALUE: 20
PIF_VALUE: 19
PIF_VALUE: 14
PIF_VALUE: 20
PIF_VALUE: 14
PIF_VALUE: 2
PIF_VALUE: 21
PIF_VALUE: 22
PIF_VALUE: 17
PIF_VALUE: 20
PIF_VALUE: 22
PIF_VALUE: 23
PIF_VALUE: 15
PIF_VALUE: 24
PIF_VALUE: 24
PIF_VALUE: 20
PIF_VALUE: 20
PIF_VALUE: 22
PIF_VALUE: 6
PIF_VALUE: 21
PIF_VALUE: 20
PIF_VALUE: 10
PIF_VALUE: 20
PIF_VALUE: 14
PIF_VALUE: 20
PIF_VALUE: 19
PIF_VALUE: 20
PIF_VALUE: 14
PIF_VALUE: 22
PIF_VALUE: 22
PIF_VALUE: 23
PIF_VALUE: 15
PIF_VALUE: 17
PIF_VALUE: 20
PIF_VALUE: 24
PIF_VALUE: 21
PIF_VALUE: 15
PIF_VALUE: 18
PIF_VALUE: 21
PIF_VALUE: 20
PIF_VALUE: 21
PIF_VALUE: 23
PIF_VALUE: 23
PIF_VALUE: 22
PIF_VALUE: 27
PIF_VALUE: 20
PIF_VALUE: 17
PIF_VALUE: 14
PIF_VALUE: 23
PIF_VALUE: 20
PIF_VALUE: 14
PIF_VALUE: 21
PIF_VALUE: 5
PIF_VALUE: 20
PIF_VALUE: 21
PIF_VALUE: 23
PIF_VALUE: 29
PIF_VALUE: 20
PIF_VALUE: 20
PIF_VALUE: 23
PIF_VALUE: 20
PIF_VALUE: 22
PIF_VALUE: 21
PIF_VALUE: 18
PIF_VALUE: 20
PIF_VALUE: 19
PIF_VALUE: 0
PIF_VALUE: 18
PIF_VALUE: 20
PIF_VALUE: 19
PIF_VALUE: 22
PIF_VALUE: 23
PIF_VALUE: 20
PIF_VALUE: 20
PIF_VALUE: 5
PIF_VALUE: 17
PIF_VALUE: 20
PIF_VALUE: 14
PIF_VALUE: 23
PIF_VALUE: 14
PIF_VALUE: 23
PIF_VALUE: 20
PIF_VALUE: 24
PIF_VALUE: 15
PIF_VALUE: 0
PIF_VALUE: 21
PIF_VALUE: 25
PIF_VALUE: 17
PIF_VALUE: 18
PIF_VALUE: 1
PIF_VALUE: 20
PIF_VALUE: 24
PIF_VALUE: 2
PIF_VALUE: 22
PIF_VALUE: 14
PIF_VALUE: 22
PIF_VALUE: 23

## 2022-03-25 ASSESSMENT — PAIN SCALES - GENERAL
PAINLEVEL_OUTOF10: 10
PAINLEVEL_OUTOF10: 7
PAINLEVEL_OUTOF10: 7
PAINLEVEL_OUTOF10: 10
PAINLEVEL_OUTOF10: 9

## 2022-03-25 ASSESSMENT — PAIN DESCRIPTION - ORIENTATION: ORIENTATION: RIGHT

## 2022-03-25 ASSESSMENT — PAIN - FUNCTIONAL ASSESSMENT: PAIN_FUNCTIONAL_ASSESSMENT: 0-10

## 2022-03-25 ASSESSMENT — PAIN DESCRIPTION - LOCATION: LOCATION: ABDOMEN

## 2022-03-25 ASSESSMENT — PAIN DESCRIPTION - DESCRIPTORS: DESCRIPTORS: CONSTANT

## 2022-03-25 ASSESSMENT — PAIN DESCRIPTION - FREQUENCY: FREQUENCY: CONTINUOUS

## 2022-03-25 ASSESSMENT — ENCOUNTER SYMPTOMS: SHORTNESS OF BREATH: 0

## 2022-03-25 ASSESSMENT — LIFESTYLE VARIABLES: SMOKING_STATUS: 0

## 2022-03-25 ASSESSMENT — PAIN DESCRIPTION - PAIN TYPE: TYPE: SURGICAL PAIN

## 2022-03-25 NOTE — BRIEF OP NOTE
Brief Postoperative Note      Patient: Roseann Hudson  YOB: 1950  MRN: [id number]    Date of Procedure: 3/25/2022    Pre-Op Diagnosis: ESOPHAGEAL CANCER    Post-Op Diagnosis: Same       Procedure(s):  ESOPHAGOGASTRODUODENOSCOPY, DANIKA BHARAT ESOPHAGECTOMY WITH OPEN THORACOTOMY, BOTOX TO PYLORUS, INTERCOSTAL NERVE BLOCK WITH CRYOABLATION    Surgeon(s):  Sony Aceves MD    Assistant:  Surgical Assistant: Kurt Castillo  Physician Assistant: KB Lujan    Anesthesia: General    Estimated Blood Loss (mL): 320     Complications: None    Specimens:   ID Type Source Tests Collected by Time Destination   A : LEVEL 8&9 LYMPH NODES Tissue Lymph Node SURGICAL PATHOLOGY Sony Aceves MD 3/25/2022 1040    B : LEVEL 7 LYMPH NODE Tissue Lymph Node SURGICAL PATHOLOGY Sony Aceves MD 3/25/2022 1102    C : GASTROESOPHAGEAL TUMOR Tissue Tissue SURGICAL PATHOLOGY Sony Aceves MD 3/25/2022 1119    D : PARAESOPHAGEAL LYMPH NODE Tissue Lymph Node SURGICAL PATHOLOGY Sony Aceves MD 3/25/2022 1124    E : DISTAL STOMACH Tissue Stomach SURGICAL PATHOLOGY Sony Aceves MD 3/25/2022 1136    F : TRUE STOMACH ANASTAMOSIS Tissue Stomach SURGICAL PATHOLOGY Sony Aceves MD 3/25/2022 1144    G : TRUE ESOPHAGUS ANASTAMOSIS Respiratory Esophagus SURGICAL PATHOLOGY Sony Aceves MD 3/25/2022 1146              Findings: clear     Electronically signed by Sony Aceves MD on 3/25/2022 at 1:13 PM 39.1

## 2022-03-25 NOTE — H&P
[facility]  Clinic Note      3/14/2022 9:50 AM  Surgeon:  Radha Kim            Chief complaint : Esophageal Cancer  Subjective:  Mr. Dav Ritter has finished chemo and radiation and has had his follow up PET scan. Here to discuss options. With partial good response     Also has been cleared by Dr. Akbar Enriquez for surgery.      Vital Signs: BP (!) 152/86 (Site: Left Upper Arm, Position: Sitting, Cuff Size: Medium Adult)   Pulse 77   Temp 98.2 °F (36.8 °C) (Temporal)   Ht 6' (1.829 m)   Wt 238 lb (108 kg)   SpO2 98%   BMI 32.28 kg/m²      I/O:  No intake or output data in the 24 hours ending 03/14/22 0950     Exam:   Cardiovascular: S1 plus S2 2 0 no additional sound  Pulmonary: Laterally clear no additional sound     Chest X-Ray:    PET scan was discussed with the patient detail showed partial response with no metastatic disease  [id number]   This is a summary report. The complete report is available in the patient's medical record. If    you cannot access the medical record, please contact the sending organization for a detailed    fax or copy.     ï¿½ Impression. Moderate disease of the mid LAD. Nonobstructive disease in    the mid circumflex artery and mid RCA   ï¿½ Recommendation medical therapy                Labs:   CBC: No results for input(s): WBC, HGB, HCT, MCV, PLT in the last 72 hours. BMP: No results for input(s): NA, K, CL, CO2, PHOS, BUN, CREATININE, CA in the last 72 hours. PT/INR: No results for input(s): PROTIME, INR in the last 72 hours. APTT: No results for input(s): APTT in the last 72 hours.     Scheduled Meds:          Patient Active Problem List   Diagnosis    NSTEMI (non-ST elevation myocardial infarction) ([facility] [address]    HTN (hypertension)    CAD (coronary artery disease)    HLD (hyperlipidemia)         Assessment/Plan:   1.  As discussed with the patient's the past prognostic result he could have after surgical resection especially if the tumor was completely responder we could not identify any tumor in the specimen. 2. We will perform extensive lymphadenectomy which will help with primary local control and good prognostic indicator  3. Regardless of surgery or no surgery patient will be referred back to oncology for immune therapy. 4. Plan for surgical resection on March 25     Indio Myles MD FACS    Morning of Surgery:  Patient was seen & examined this morning. Imaging was reviewed. History and physical was reviewed. No new event or complaint since seen last.   I will proceed with the previously mentioned plan.     Mariela Kenny PA-C

## 2022-03-25 NOTE — PROGRESS NOTES
Received bedside report from [facility]. Pts skin was assessed. Turned and repositioned. Mose[place] in place. Orders verified. Pt A&O able to follow commands. Call light in reach, bed in lowest position, will continue to monitor.

## 2022-03-25 NOTE — ANESTHESIA PRE PROCEDURE
Department of Anesthesiology  Preprocedure Note       Name:  Chelsea Rossi   Age:  80 y.o.  :  1950                                          MRN:  [id number]         Date:  3/25/2022      Surgeon: Aranza Gao):  Little Parks MD    Procedure: Procedure(s):  ESOPHAGOGASTRODUODENOSCOPY, DANIKA BHARAT ESOPHAGECTOMY WITH OPEN THORACOTOMY, BOTOX TO PYLORUS, INTERCOSTAL NERVE BLOCK WITH CRYOABLATION    Medications prior to admission:   Prior to Admission medications    Medication Sig Start Date End Date Taking?  Authorizing Provider   Coenzyme Q10 (CO Q 10) 10 MG CAPS Take 1 capsule by mouth daily    Historical Provider, MD   metoprolol tartrate (LOPRESSOR) 25 MG tablet Take 50 mg in AM and 25mg at night 20   Leo Ceballos MD   Nutritional Supplements (VITAMIN D BOOSTER PO) Take by mouth    Historical Provider, MD   amLODIPine (NORVASC) 10 MG tablet Take 1 tablet by mouth daily 19   Leo Ceballos MD   omeprazole (PRILOSEC) 20 MG delayed release capsule Take 20 mg by mouth daily    Historical Provider, MD   Multiple Vitamins-Minerals (MULTIVITAMIN ADULT PO) Take by mouth daily  Patient not taking: Reported on 2021    Historical Provider, MD   atorvastatin (LIPITOR) 40 MG tablet Take 40 mg by mouth daily     Historical Provider, MD   aspirin 81 MG tablet Take 81 mg by mouth daily    Historical Provider, MD       Current medications:    Current Facility-Administered Medications   Medication Dose Route Frequency Provider Last Rate Last Admin    famotidine (PEPCID) 20 MG/2ML injection             CEFAZOLIN 2000 MG D5W 100 ML IVPB IVPB             lidocaine PF 1 % injection 1 mL  1 mL IntraDERmal Once PRN Kasey Olivo MD        lactated ringers infusion   IntraVENous Continuous Kasey Olivo MD        sodium chloride flush 0.9 % injection 10 mL  10 mL IntraVENous 2 times per day Kasey Olivo MD        sodium chloride flush 0.9 % injection 10 mL  10 mL IntraVENous PRN Kasey Olivo MD        0.9 % sodium chloride infusion  25 mL IntraVENous PRN Behzad Jean Baptiste MD        famotidine (PEPCID) 20 mg in sodium chloride (PF) 10 mL injection  20 mg IntraVENous Once Behzad Jean Baptiste MD           Allergies:  No Known Allergies    Problem List:    Patient Active Problem List   Diagnosis Code    NSTEMI (non-ST elevation myocardial infarction) ([facility] [address] I21.4    HTN (hypertension) I10    CAD (coronary artery disease) I25.10    HLD (hyperlipidemia) E78.5       Past Medical History:        Diagnosis Date    CAD (coronary artery disease)     S/P NSTEMI with SURINDER LCx 4/14/15    History of tobacco use     Quit smoking.  Hyperlipidemia     on statin, LDL     Hypertension     controlled    Plantar fasciitis     Prostate cancer [facility])        Past Surgical History:        Procedure Laterality Date    CARDIAC SURGERY      coronary stent x1    CORONARY ANGIOPLASTY WITH STENT PLACEMENT      PENILE PROSTHESIS PLACEMENT  2018    PROSTATECTOMY  2013    PTCA         Social History:    Social History     Tobacco Use    Smoking status: Former Smoker     Quit date: 1994     Years since quittin.2    Smokeless tobacco: Never Used   Substance Use Topics    Alcohol use: Yes     Comment: occasional                                Counseling given: Not Answered      Vital Signs (Current):   Vitals:    22 1556   Weight: 230 lb (104.3 kg)   Height: 6' (1.829 m)                                              BP Readings from Last 3 Encounters:   22 (!) 152/86   10/12/20 132/82   19 130/80       NPO Status:  MN+, SEE MAR FOR AM MEDS                                                                               BMI:   Wt Readings from Last 3 Encounters:   22 230 lb (104.3 kg)   22 238 lb (108 kg)   21 232 lb (105.2 kg)     Body mass index is 31.19 kg/m².     CBC:   Lab Results   Component Value Date    WBC 5.0 2022    RBC 4.81 2022    HGB 14.7 2022 HCT 43.4 03/21/2022    MCV 90.2 03/21/2022    RDW 13.6 03/21/2022     03/21/2022       CMP:   Lab Results   Component Value Date     03/21/2022    K 5.0 03/21/2022     03/21/2022    CO2 24 03/21/2022    BUN 17 03/21/2022    CREATININE 0.9 03/21/2022    GFRAA >60 03/21/2022    LABGLOM >60 03/21/2022    GLUCOSE 111 03/21/2022    PROT 7.5 08/21/2018    CALCIUM 9.7 03/21/2022    BILITOT 0.6 08/21/2018    ALKPHOS 71 08/21/2018    AST 17 08/21/2018    ALT 25 08/21/2018       POC Tests: No results for input(s): POCGLU, POCNA, POCK, POCCL, POCBUN, POCHEMO, POCHCT in the last 72 hours. Coags:   Lab Results   Component Value Date    PROTIME 11.1 03/21/2022    INR 0.98 03/21/2022    APTT 32.4 03/21/2022       HCG (If Applicable): No results found for: PREGTESTUR, PREGSERUM, HCG, HCGQUANT     ABGs: No results found for: PHART, PO2ART, BXJ6XRT, IRH9GDB, BEART, N8ZWBZMI     Type & Screen (If Applicable):  No results found for: LABABO, LABRH    Drug/Infectious Status (If Applicable):  No results found for: HIV, HEPCAB    COVID-19 Screening (If Applicable):   Lab Results   Component Value Date    COVID19 Not Detected 03/21/2022           Anesthesia Evaluation  Patient summary reviewed no history of anesthetic complications:   Airway: Mallampati: II  TM distance: >3 FB   Neck ROM: full  Mouth opening: > = 3 FB Dental:          Pulmonary: breath sounds clear to auscultation  (+) sleep apnea: on CPAP,      (-) COPD, asthma, shortness of breath, recent URI and not a current smoker          Patient did not smoke on day of surgery.                  Cardiovascular:    (+) hypertension:, past MI (2015): > 6 months, CAD: obstructive, CABG/stent (1 STENT, 2015):, hyperlipidemia    (-) pacemaker, valvular problems/murmurs, dysrhythmias and  angina    ECG reviewed  Rhythm: regular  Rate: normal  Echocardiogram reviewed  Stress test reviewed       Beta Blocker:  Dose within 24 Hrs         Neuro/Psych:      (-) seizures, TIA, CVA and depression/anxiety            GI/Hepatic/Renal:   (+) GERD: well controlled,      (-) liver disease, no renal disease, bowel prep and no morbid obesity       Endo/Other:    (+) : arthritis:., malignancy/cancer (PROSTATE, S/P RESECT.  //  ESOPHAGUS). (-) diabetes mellitus, hypothyroidism, hyperthyroidism, blood dyscrasia               Abdominal:       Abdomen: soft. Vascular: negative vascular ROS. Other Findings:             Anesthesia Plan      general     ASA 3     (2+PIV, ? DEBBIE, DALTON)  Induction: intravenous. MIPS: Postoperative opioids intended and Prophylactic antiemetics administered. Anesthetic plan and risks discussed with patient. Use of blood products discussed with patient whom consented to blood products. Plan discussed with CRNA. This pre-anesthesia assessment may be used as a history and physical.    DOS STAFF ADDENDUM:    Pt seen and examined, chart reviewed (including anesthesia, drug and allergy history). No interval changes to history and physical examination. Anesthetic plan, risks, benefits, alternatives, and personnel involved discussed with patient. Patient verbalized an understanding and agrees to proceed.       Darya Almendarez MD  March 25, 2022  6:26 AM      Darya Almendarez MD   3/25/2022

## 2022-03-25 NOTE — CARE COORDINATION
[facility]    Referral received from [facility] to follow for home care services. I will follow for needs, and speak with patient to verify demos.       Dileep Patel RN, BSN CTN  [facility] [phone]

## 2022-03-25 NOTE — ANESTHESIA POSTPROCEDURE EVALUATION
Department of Anesthesiology  Postprocedure Note    Patient: Isac Montgomery  MRN: [id number]  YOB: 1950  Date of evaluation: 3/25/2022  Time:  3:34 PM     Procedure Summary     Date: 03/25/22 Room / Location: [facility]    Anesthesia Start: 2351 Anesthesia Stop: 1974    Procedure: ESOPHAGOGASTRODUODENOSCOPY, DANIKA BHARAT ESOPHAGECTOMY WITH OPEN THORACOTOMY, BOTOX TO PYLORUS, INTERCOSTAL NERVE BLOCK WITH CRYOABLATION (N/A Esophagus) Diagnosis:       [facility]      (ESOPHAGEAL CANCER)    Surgeons: Loyd Allison MD Responsible Provider: Tahir Quevedo MD    Anesthesia Type: general ASA Status: 3          Anesthesia Type: general    Cindy Phase I: Cindy Score: 10    Cidny Phase II:      Last vitals: Reviewed and per EMR flowsheets.    Vitals:    03/18/22 1556 03/25/22 0639   BP:  139/76   Pulse:  80   Resp:  16   Temp:  98 °F (36.7 °C)   TempSrc:  Temporal   SpO2:  100%   Weight: 230 lb (104.3 kg)    Height: 6' (1.829 m)        Anesthesia Post Evaluation    Patient location during evaluation: bedside  Patient participation: complete - patient participated  Level of consciousness: awake  Airway patency: patent  Nausea & Vomiting: no nausea  Complications: no  Cardiovascular status: hemodynamically stable  Respiratory status: acceptable  Hydration status: euvolemic

## 2022-03-26 ENCOUNTER — APPOINTMENT (OUTPATIENT)
Dept: GENERAL RADIOLOGY | Age: 72
DRG: 327 | End: 2022-03-26
Attending: THORACIC SURGERY (CARDIOTHORACIC VASCULAR SURGERY)
Payer: COMMERCIAL

## 2022-03-26 LAB
ANION GAP SERPL CALCULATED.3IONS-SCNC: 12 MMOL/L (ref 3–16)
BASE EXCESS ARTERIAL: -1.6 MMOL/L (ref -3–3)
BUN BLDV-MCNC: 17 MG/DL (ref 7–20)
CALCIUM SERPL-MCNC: 8.4 MG/DL (ref 8.3–10.6)
CARBOXYHEMOGLOBIN ARTERIAL: 0.4 % (ref 0–1.5)
CHLORIDE BLD-SCNC: 98 MMOL/L (ref 99–110)
CO2: 20 MMOL/L (ref 21–32)
CREAT SERPL-MCNC: 0.7 MG/DL (ref 0.8–1.3)
GFR AFRICAN AMERICAN: >60
GFR NON-AFRICAN AMERICAN: >60
GLUCOSE BLD-MCNC: 174 MG/DL (ref 70–99)
HCO3 ARTERIAL: 21.9 MMOL/L (ref 21–29)
HCT VFR BLD CALC: 42.7 % (ref 40.5–52.5)
HEMOGLOBIN, ART, EXTENDED: 15.9 G/DL (ref 13.5–17.5)
HEMOGLOBIN: 14.6 G/DL (ref 13.5–17.5)
MAGNESIUM: 1.6 MG/DL (ref 1.8–2.4)
MCH RBC QN AUTO: 30.5 PG (ref 26–34)
MCHC RBC AUTO-ENTMCNC: 34.1 G/DL (ref 31–36)
MCV RBC AUTO: 89.3 FL (ref 80–100)
METHEMOGLOBIN ARTERIAL: 0.5 %
O2 SAT, ARTERIAL: 91.1 %
O2 THERAPY: ABNORMAL
PCO2 ARTERIAL: 33.9 MMHG (ref 35–45)
PDW BLD-RTO: 13.1 % (ref 12.4–15.4)
PH ARTERIAL: 7.43 (ref 7.35–7.45)
PHOSPHORUS: 3.1 MG/DL (ref 2.5–4.9)
PLATELET # BLD: 252 K/UL (ref 135–450)
PMV BLD AUTO: 5.3 FL (ref 5–10.5)
PO2 ARTERIAL: 57.6 MMHG (ref 75–108)
POTASSIUM SERPL-SCNC: 4.1 MMOL/L (ref 3.5–5.1)
RBC # BLD: 4.78 M/UL (ref 4.2–5.9)
SODIUM BLD-SCNC: 130 MMOL/L (ref 136–145)
TCO2 ARTERIAL: 22.9 MMOL/L
WBC # BLD: 12 K/UL (ref 4–11)

## 2022-03-26 PROCEDURE — 94669 MECHANICAL CHEST WALL OSCILL: CPT

## 2022-03-26 PROCEDURE — 2580000003 HC RX 258: Performed by: NURSE PRACTITIONER

## 2022-03-26 PROCEDURE — 6360000002 HC RX W HCPCS: Performed by: THORACIC SURGERY (CARDIOTHORACIC VASCULAR SURGERY)

## 2022-03-26 PROCEDURE — 82803 BLOOD GASES ANY COMBINATION: CPT

## 2022-03-26 PROCEDURE — 2500000003 HC RX 250 WO HCPCS: Performed by: THORACIC SURGERY (CARDIOTHORACIC VASCULAR SURGERY)

## 2022-03-26 PROCEDURE — 2500000003 HC RX 250 WO HCPCS: Performed by: NURSE PRACTITIONER

## 2022-03-26 PROCEDURE — 2580000003 HC RX 258: Performed by: THORACIC SURGERY (CARDIOTHORACIC VASCULAR SURGERY)

## 2022-03-26 PROCEDURE — 97167 OT EVAL HIGH COMPLEX 60 MIN: CPT

## 2022-03-26 PROCEDURE — 94761 N-INVAS EAR/PLS OXIMETRY MLT: CPT

## 2022-03-26 PROCEDURE — P9045 ALBUMIN (HUMAN), 5%, 250 ML: HCPCS | Performed by: THORACIC SURGERY (CARDIOTHORACIC VASCULAR SURGERY)

## 2022-03-26 PROCEDURE — 97163 PT EVAL HIGH COMPLEX 45 MIN: CPT

## 2022-03-26 PROCEDURE — 97110 THERAPEUTIC EXERCISES: CPT

## 2022-03-26 PROCEDURE — 6370000000 HC RX 637 (ALT 250 FOR IP): Performed by: THORACIC SURGERY (CARDIOTHORACIC VASCULAR SURGERY)

## 2022-03-26 PROCEDURE — 97530 THERAPEUTIC ACTIVITIES: CPT

## 2022-03-26 PROCEDURE — 80048 BASIC METABOLIC PNL TOTAL CA: CPT

## 2022-03-26 PROCEDURE — 84100 ASSAY OF PHOSPHORUS: CPT

## 2022-03-26 PROCEDURE — 2700000000 HC OXYGEN THERAPY PER DAY

## 2022-03-26 PROCEDURE — A4216 STERILE WATER/SALINE, 10 ML: HCPCS | Performed by: THORACIC SURGERY (CARDIOTHORACIC VASCULAR SURGERY)

## 2022-03-26 PROCEDURE — 83735 ASSAY OF MAGNESIUM: CPT

## 2022-03-26 PROCEDURE — 85027 COMPLETE CBC AUTOMATED: CPT

## 2022-03-26 PROCEDURE — 94640 AIRWAY INHALATION TREATMENT: CPT

## 2022-03-26 PROCEDURE — 2000000000 HC ICU R&B

## 2022-03-26 PROCEDURE — 71045 X-RAY EXAM CHEST 1 VIEW: CPT

## 2022-03-26 PROCEDURE — 99024 POSTOP FOLLOW-UP VISIT: CPT | Performed by: THORACIC SURGERY (CARDIOTHORACIC VASCULAR SURGERY)

## 2022-03-26 RX ORDER — ALBUMIN, HUMAN INJ 5% 5 %
12.5 SOLUTION INTRAVENOUS ONCE
Status: COMPLETED | OUTPATIENT
Start: 2022-03-26 | End: 2022-03-26

## 2022-03-26 RX ORDER — METOPROLOL TARTRATE 5 MG/5ML
2.5 INJECTION INTRAVENOUS ONCE
Status: COMPLETED | OUTPATIENT
Start: 2022-03-26 | End: 2022-03-26

## 2022-03-26 RX ORDER — METOPROLOL TARTRATE 5 MG/5ML
2.5 INJECTION INTRAVENOUS
Status: DISCONTINUED | OUTPATIENT
Start: 2022-03-26 | End: 2022-03-27

## 2022-03-26 RX ORDER — FUROSEMIDE 10 MG/ML
20 INJECTION INTRAMUSCULAR; INTRAVENOUS 4 TIMES DAILY
Status: DISCONTINUED | OUTPATIENT
Start: 2022-03-26 | End: 2022-03-29

## 2022-03-26 RX ORDER — MORPHINE SULFATE/0.9% NACL/PF 1 MG/ML
SYRINGE (ML) INJECTION CONTINUOUS
Status: DISCONTINUED | OUTPATIENT
Start: 2022-03-26 | End: 2022-03-29

## 2022-03-26 RX ORDER — NALOXONE HYDROCHLORIDE 0.4 MG/ML
0.4 INJECTION, SOLUTION INTRAMUSCULAR; INTRAVENOUS; SUBCUTANEOUS PRN
Status: DISCONTINUED | OUTPATIENT
Start: 2022-03-26 | End: 2022-03-29

## 2022-03-26 RX ADMIN — ALBUTEROL SULFATE 2.5 MG: 2.5 SOLUTION RESPIRATORY (INHALATION) at 19:30

## 2022-03-26 RX ADMIN — FUROSEMIDE 20 MG: 10 INJECTION, SOLUTION INTRAMUSCULAR; INTRAVENOUS at 12:37

## 2022-03-26 RX ADMIN — BACITRACIN: 500 OINTMENT TOPICAL at 11:12

## 2022-03-26 RX ADMIN — DEXMEDETOMIDINE HYDROCHLORIDE 0.4 MCG/KG/HR: 100 INJECTION, SOLUTION INTRAVENOUS at 09:10

## 2022-03-26 RX ADMIN — METOPROLOL TARTRATE 2.5 MG: 1 INJECTION, SOLUTION INTRAVENOUS at 08:49

## 2022-03-26 RX ADMIN — FUROSEMIDE 20 MG: 10 INJECTION, SOLUTION INTRAMUSCULAR; INTRAVENOUS at 20:21

## 2022-03-26 RX ADMIN — MORPHINE SULFATE 30 MG: 1 INJECTION INTRAVENOUS at 09:32

## 2022-03-26 RX ADMIN — SODIUM CHLORIDE, PRESERVATIVE FREE 10 ML: 5 INJECTION INTRAVENOUS at 20:26

## 2022-03-26 RX ADMIN — FAMOTIDINE 20 MG: 10 INJECTION, SOLUTION INTRAVENOUS at 20:23

## 2022-03-26 RX ADMIN — MORPHINE SULFATE 4 MG: 4 INJECTION, SOLUTION INTRAMUSCULAR; INTRAVENOUS at 00:05

## 2022-03-26 RX ADMIN — MORPHINE SULFATE 4 MG: 4 INJECTION, SOLUTION INTRAMUSCULAR; INTRAVENOUS at 03:58

## 2022-03-26 RX ADMIN — POTASSIUM CHLORIDE, DEXTROSE MONOHYDRATE AND SODIUM CHLORIDE: 150; 5; 450 INJECTION, SOLUTION INTRAVENOUS at 17:03

## 2022-03-26 RX ADMIN — FUROSEMIDE 20 MG: 10 INJECTION, SOLUTION INTRAMUSCULAR; INTRAVENOUS at 16:55

## 2022-03-26 RX ADMIN — MORPHINE SULFATE 4 MG: 4 INJECTION, SOLUTION INTRAMUSCULAR; INTRAVENOUS at 07:42

## 2022-03-26 RX ADMIN — SODIUM CHLORIDE, PRESERVATIVE FREE 10 ML: 5 INJECTION INTRAVENOUS at 09:41

## 2022-03-26 RX ADMIN — ALBUTEROL SULFATE 2.5 MG: 2.5 SOLUTION RESPIRATORY (INHALATION) at 08:05

## 2022-03-26 RX ADMIN — POTASSIUM CHLORIDE, DEXTROSE MONOHYDRATE AND SODIUM CHLORIDE: 150; 5; 450 INJECTION, SOLUTION INTRAVENOUS at 01:25

## 2022-03-26 RX ADMIN — ALBUMIN (HUMAN) 12.5 G: 12.5 INJECTION, SOLUTION INTRAVENOUS at 14:40

## 2022-03-26 RX ADMIN — METOPROLOL TARTRATE 2.5 MG: 1 INJECTION, SOLUTION INTRAVENOUS at 12:37

## 2022-03-26 RX ADMIN — METOPROLOL TARTRATE 2.5 MG: 1 INJECTION, SOLUTION INTRAVENOUS at 20:19

## 2022-03-26 RX ADMIN — DEXTROSE MONOHYDRATE 12.5 MG/HR: 50 INJECTION, SOLUTION INTRAVENOUS at 00:11

## 2022-03-26 RX ADMIN — METOPROLOL TARTRATE 2.5 MG: 1 INJECTION, SOLUTION INTRAVENOUS at 22:00

## 2022-03-26 RX ADMIN — METOPROLOL TARTRATE 2.5 MG: 1 INJECTION, SOLUTION INTRAVENOUS at 16:22

## 2022-03-26 RX ADMIN — FAMOTIDINE 20 MG: 10 INJECTION, SOLUTION INTRAVENOUS at 07:43

## 2022-03-26 RX ADMIN — ALBUTEROL SULFATE 2.5 MG: 2.5 SOLUTION RESPIRATORY (INHALATION) at 11:38

## 2022-03-26 RX ADMIN — ALBUTEROL SULFATE 2.5 MG: 2.5 SOLUTION RESPIRATORY (INHALATION) at 15:28

## 2022-03-26 RX ADMIN — MORPHINE SULFATE 4 MG: 4 INJECTION, SOLUTION INTRAMUSCULAR; INTRAVENOUS at 02:04

## 2022-03-26 RX ADMIN — DEXTROSE MONOHYDRATE 12.5 MG/HR: 50 INJECTION, SOLUTION INTRAVENOUS at 02:29

## 2022-03-26 RX ADMIN — MORPHINE SULFATE 4 MG: 4 INJECTION, SOLUTION INTRAMUSCULAR; INTRAVENOUS at 05:57

## 2022-03-26 ASSESSMENT — PAIN DESCRIPTION - FREQUENCY
FREQUENCY: CONTINUOUS

## 2022-03-26 ASSESSMENT — PAIN - FUNCTIONAL ASSESSMENT
PAIN_FUNCTIONAL_ASSESSMENT: PREVENTS OR INTERFERES SOME ACTIVE ACTIVITIES AND ADLS
PAIN_FUNCTIONAL_ASSESSMENT: PREVENTS OR INTERFERES SOME ACTIVE ACTIVITIES AND ADLS

## 2022-03-26 ASSESSMENT — PAIN DESCRIPTION - ORIENTATION
ORIENTATION: RIGHT

## 2022-03-26 ASSESSMENT — PAIN DESCRIPTION - PAIN TYPE
TYPE: ACUTE PAIN;SURGICAL PAIN
TYPE: SURGICAL PAIN
TYPE: SURGICAL PAIN
TYPE: ACUTE PAIN;SURGICAL PAIN
TYPE: ACUTE PAIN;SURGICAL PAIN
TYPE: SURGICAL PAIN;ACUTE PAIN

## 2022-03-26 ASSESSMENT — PAIN SCALES - GENERAL
PAINLEVEL_OUTOF10: 8
PAINLEVEL_OUTOF10: 8
PAINLEVEL_OUTOF10: 7
PAINLEVEL_OUTOF10: 5
PAINLEVEL_OUTOF10: 9
PAINLEVEL_OUTOF10: 8
PAINLEVEL_OUTOF10: 8
PAINLEVEL_OUTOF10: 7
PAINLEVEL_OUTOF10: 0
PAINLEVEL_OUTOF10: 4
PAINLEVEL_OUTOF10: 8

## 2022-03-26 ASSESSMENT — PAIN DESCRIPTION - PROGRESSION
CLINICAL_PROGRESSION: GRADUALLY IMPROVING

## 2022-03-26 ASSESSMENT — PAIN DESCRIPTION - LOCATION
LOCATION: ABDOMEN

## 2022-03-26 ASSESSMENT — PAIN DESCRIPTION - ONSET
ONSET: ON-GOING
ONSET: ON-GOING

## 2022-03-26 ASSESSMENT — PAIN DESCRIPTION - DESCRIPTORS
DESCRIPTORS: DISCOMFORT
DESCRIPTORS: CONSTANT
DESCRIPTORS: DISCOMFORT;CONSTANT
DESCRIPTORS: CONSTANT;DISCOMFORT

## 2022-03-26 NOTE — PROGRESS NOTES
Occupational Therapy   Occupational Therapy Initial Assessment/Treatment  Date: 3/26/2022   Patient Name: Angela Sherman  MRN: [id number]     : 1950    Date of Service: 3/26/2022    Discharge Recommendations:  Continue to assess pending progress       Assessment   Performance deficits / Impairments: Decreased functional mobility ; Decreased endurance;Decreased ADL status; Decreased balance;Decreased strength;Decreased posture  Patient admitted from homes, Jo-Ann Hutchison lives with wife (s/p knee sx 1 month ago). Pt  With h/o esophageal cancer; s/p Triston Jose esophagectomy 3/25/22. Today patient limited by elevated HR, RN and MD aware, stood from chair with [id number] for LE ADLs. After evaluation, pt found to be presenting with the above mentioned occupational performance deficits which are affecting participation in daily living skills. Pt would benefit from continued skilled occupational therapy to address ADLs, functional mobility, and safety while in acute care. Prognosis: Good  Decision Making: High Complexity  OT Education: OT Role;Plan of Care;Precautions; ADL Adaptive Strategies;Transfer Training  Patient Education: Disease specific: Pt educated on benefits of OOB activity to decrease risks associated with prolonged bedrest while in hospital. Patient educated on pursed lip breathing to aid in recovery and energy conservation s/p therapeutic activity as related to patient's respiratory condition. Patient verbalized understanding of above education. REQUIRES OT FOLLOW UP: Yes  Activity Tolerance  Activity Tolerance: Patient limited by fatigue;Treatment limited secondary to medical complications (free text); Patient Tolerated treatment well  Activity Tolerance: Vitals:  bpm, O2 94% 6L . /61, BP  145/85 at rest. With standing activity HR up to 145.   Safety Devices  Safety Devices in place: Yes  Type of devices: Left in chair;Chair alarm in place           Patient Diagnosis(es): The encounter diagnosis was Malignant neoplasm of esophagus, unspecified location [facility]). has a past medical history of CAD (coronary artery disease), History of tobacco use, Hyperlipidemia, Hypertension, Plantar fasciitis, and Prostate cancer ([address] has a past surgical history that includes Prostatectomy (03/2013); Coronary angioplasty with stent; Percutaneous Transluminal Coronary Angio; Cardiac surgery (2015); Penile prosthesis placement (2018); and Esophagectomy (N/A, 3/25/2022). Restrictions  Restrictions/Precautions  Restrictions/Precautions: General Precautions,Fall Risk,Up as Tolerated  Position Activity Restriction  Other position/activity restrictions: 2 chest tubes to suction, peñaloza, NG tube , O2 6L    Subjective   General  Chart Reviewed: Yes,Orders,Progress Notes,History and Physical,Operative Notes  Patient assessed for rehabilitation services?: Yes  Family / Caregiver Present: No  Referring Practitioner: Yayo Morfin MD 3/25/22  Diagnosis: ESOPHAGEAL CANCER; s/p Triston Gonsalez Officer esophagectomy 3/25/22  Subjective  Subjective: Pt up in chair upon entry, reporting dizzy/light headedness  General Comment  Comments: RN cleared pt for therapy. Pain Screening  Patient Currently in Pain: Yes  Pain Assessment  Pain Assessment: 0-10  Pain Level: 7  Pain Type: Surgical pain;Acute pain  Pain Location: Abdomen  Pain Orientation: Right  Functional Pain Assessment: Prevents or interferes some active activities and ADLs  Non-Pharmaceutical Pain Intervention(s): Ambulation/Increased Activity;Repositioned; Rest  Response to Pain Intervention: Patient Satisfied  Vital Signs  Patient Currently in Pain: Yes  Pre Treatment Pain Screening  Intervention List: Patient able to continue with treatment  Vital Signs  Pulse: 96  Heart Rate Source: Monitor  Resp: 18  BP: 105/76  MAP (mmHg): 86  Oxygen Therapy  End Tidal CO2: 24 (%)  Social/Functional History  Social/Functional History  Lives With: Spouse (spouse works from home, just had knee replacement month ago)  Type of Home: House  Home Layout: One level (1 step to get to dining room)  Home Access: Level entry  Bathroom Shower/Tub: Tub/Shower unit  Bathroom Toilet: Standard  Bathroom Equipment: Hand-held shower  Home Equipment:  (no home equipment)  ADL Assistance: Independent  Homemaking Assistance: Independent  Ambulation Assistance: Independent  Transfer Assistance: Independent  Active : Yes  Occupation: Part time employment  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[employer]\"  Leisure & Hobbies: fish       Objective   Vision: Impaired  Vision Exceptions: Wears glasses at all times  Hearing: Within functional limits    Orientation  Overall Orientation Status: Within Functional Limits     Balance  Sitting Balance: Supervision  Standing Balance: Minimal assistance  Standing Balance  Time: 1 min  Activity: marching in place prep for mobility  Comment: HR elevated to 140  Functional Mobility  Functional Mobility Comments: KARINE d/t elevated HR     ADL  UE Dressing: Moderate assistance  LE Dressing: Maximum assistance;Dependent/Total  Toileting: Dependent/Total (peñaloza)     Tone RUE  RUE Tone: Normotonic  Tone LUE  LUE Tone: Normotonic  Coordination  Movements Are Fluid And Coordinated: Yes     Bed mobility  Comment: KARINE- pt up to chair pre/post session. Transfers  Sit to stand: Minimal assistance (from recliner chair)  Stand to sit: Minimal assistance     Cognition  Overall Cognitive Status: Exceptions  Arousal/Alertness: Delayed responses to stimuli  Following Commands: Follows multistep commands with repitition; Follows multistep commands with increased time  Attention Span: Attends with cues to redirect  Memory: Appears intact  Safety Judgement: Decreased awareness of need for safety  Problem Solving: Decreased awareness of errors  Insights: Decreased awareness of deficits  Initiation: Requires cues for some  Sequencing: Requires cues for some Sensation  Overall Sensation Status: WNL  Exercises  Shoulder Elevation: x 10 reps  Shoulder Flexion: RUE only x 10 reps 2* A-line placement on LUE  Elbow Flexion: RUE only x 10 reps 2* A-line placement on LUE  Elbow Extension: RUE only x 10 reps 2* A-line placement on LUE  Supination: B x 10 reps  Pronation: B x 10 reps  Finger Flexion: B x 10 reps  Finger Extension: B x 10 reps         Plan   Plan  Times per week: 4-6x's a week while in acute care             AM-PAC Score        AM-[facility] Inpatient Daily Activity Raw Score: 12 (03/26/22 1111)  AM-PAC Inpatient ADL T-Scale Score : 30.6 (03/26/22 1111)  ADL Inpatient CMS 0-100% Score: 66.57 (03/26/22 1111)  ADL Inpatient CMS G-Code Modifier : CL (03/26/22 1111)    Goals  Short term goals  Time Frame for Short term goals: 2 weeks 4/09  Short term goal 1: Pt will complete toilet transfers with SBA by 4/01  Short term goal 2: Pt will complete LE dressing with CGA  Short term goal 3: Pt will complete standing level ADLs with SBA for balance  Patient Goals   Patient goals : \"to return home\"       Therapy Time   Individual Concurrent Group Co-treatment   Time In 0818         Time Out 0851         Minutes 33         Timed Code Treatment Minutes: 23 Minutes       Surekha Lozano OTR/L  If pt is unable to be seen after this session, please let this note serve as discharge summary. Please see case management note for discharge disposition. Thank you.

## 2022-03-26 NOTE — PROGRESS NOTES
Shift: 0700/1900    Nursing assessment at handoff  stable    Rhythm changes Normal Sinus Rhythm/Sinus Tachycardia     Rhythm Intervention     Drop in Urinary Output no     Changes to drips? No    POD 1 ONLY: Pacing Wires Removed: []Yes           [] NO        [] Platelets < 85,338        [] Arrhythmia        [] Bradycardia        [] Valve Replacement         [] Pacing for Cardiac Index    LDA Insertion Date Date Changed (if needed) Discontinued Date   Art line 3/25/22     Central Line 3/25/22     ET Tube      Moseley 3/25/22     Chest Tube 3/25/22     Leg Drain      Pacing Wires        Surgery, return to OR no     Hospital Course:  POD# 1 Night: Pt remained stable overnight. Pt was diaphoretic and had some SOB when getting up in the chair. Increased oxygen from 4L to 7L. 1230ml of u/o. Nicardipine gtt is off. Changes in O2 requirements Oxygen Therapy  SpO2: 90 %  Pulse Oximeter Device Mode: Continuous  Pulse Oximeter Device Location: Finger  O2 Device: Nasal cannula  O2 Flow Rate (L/min): 4 L/min     Most recent vitals BP (!) 165/68   Pulse 126   Temp 99 °F (37.2 °C) (Core)   Resp 25   Ht 6' (1.829 m)   Wt 235 lb 10.8 oz (106.9 kg)   SpO2 90%   BMI 31.96 kg/m²       Admission weight Weight: 230 lb (104.3 kg)     Today's weight   Wt Readings from Last 1 Encounters:   03/26/22 235 lb 10.8 oz (106.9 kg)         Lab Data:  CBC:   Recent Labs     03/26/22  0411   WBC 12.0*   HGB 14.6   HCT 42.7   MCV 89.3        BMP:    Recent Labs     03/26/22  0411   *   K 4.1   CO2 20*   BUN 17   CREATININE 0.7*     LIVR: No results for input(s): AST, ALT in the last 72 hours. PT/INR: No results for input(s): PROT, INR in the last 72 hours. APTT: No results for input(s): APTT in the last 72 hours.     Drip rates at handoff:    niCARdipene (CARDENE) 50 mg in 250 mL 0.9 % sodium chloride infusion 12.5 mg/hr (03/26/22 0505)    dextrose 5% and 0.45% NaCl with KCl 20 mEq 100 mL/hr at 03/26/22 0125    sodium chloride           Electronically signed by Arian Kidd RN on 3/26/22 at 6:58 AM EDT

## 2022-03-26 NOTE — PROGRESS NOTES
Shift: 0700/1900    Nursing assessment at handoff  stable    Rhythm changes Normal Sinus Rhythm/Sinus Tachycardia     Rhythm Intervention     Drop in Urinary Output yes    Changes to drips? Yes    POD 1 ONLY: Pacing Wires Removed: []Yes           [] NO        [] Platelets < 63,688        [] Arrhythmia        [] Bradycardia        [] Valve Replacement         [] Pacing for Cardiac Index    LDA Insertion Date Date Changed (if needed) Discontinued Date   Art line 3/25/22     Central Line 3/25/22     ET Tube      Moseley 3/25/22     Chest Tube 3/25/22     Leg Drain      Pacing Wires        Surgery, return to OR no     Hospital Course:  POD# 1 Night: Pt remained stable overnight. Pt was diaphoretic and had some SOB when getting up in the chair. Increased oxygen from 4L to 7L. 1230ml of u/o. Nicardipine gtt is off. POD# 1 Day (3/26)  Pt. Briefly restarted on nicardipine gtt @ 2.5mg/hr until lopressor 2.5mg Q3h was started. Pt. BP and HR well maintained within parameters on lopressor. Precedex gtt started around 0900 at .4 mcg/kg and titrated off by 1400 due to pt becoming overly lethargic. Upon initial assessment pt was breathing short and shallow  breathes due to severe pain. PCA pump with morphine was started 1mg/ml. Initial lockout was 1mg/10 mins up to 6mg/hr. Due to increased lethargy lockout changed to 1mg/15 mins up to 4mg/hr. Pt had decreased U/O over shift and was given 20mg of lasix. Lasix had little affect on U/O so pt was given 5% albumin 250ml. Pt is A&O x4 showing SR/ST on the monitor w/ well maintained SPO2 on 6L nasal canula. Pt still experiences dyspnea, HTN, and tachycardia w/ exertion. Chest tube sites dry, clean and intact. U/O increased w/ second dose of lasix.          Changes in O2 requirements Oxygen Therapy  SpO2: 96 %  Pulse Oximeter Device Mode: Continuous  Pulse Oximeter Device Location: Finger  O2 Device: Nasal cannula  O2 Flow Rate (L/min): 6 L/min  End Tidal CO2: 29 (%)     Most recent vitals /74   Pulse 97   Temp 98.6 °F (37 °C) (Oral)   Resp 23   Ht 6' (1.829 m)   Wt 235 lb 10.8 oz (106.9 kg)   SpO2 96%   BMI 31.96 kg/m²       Admission weight Weight: 230 lb (104.3 kg)     Today's weight   Wt Readings from Last 1 Encounters:   03/26/22 235 lb 10.8 oz (106.9 kg)         Lab Data:  CBC:   Recent Labs     03/26/22  0411   WBC 12.0*   HGB 14.6   HCT 42.7   MCV 89.3        BMP:    Recent Labs     03/26/22  0411   *   K 4.1   CO2 20*   BUN 17   CREATININE 0.7*     LIVR: No results for input(s): AST, ALT in the last 72 hours. PT/INR: No results for input(s): PROT, INR in the last 72 hours. APTT: No results for input(s): APTT in the last 72 hours.     Drip rates at handoff:    niCARdipene (CARDENE) 50 mg in 250 mL 0.9 % sodium chloride infusion Stopped (03/26/22 0640)    morphine      dexmedetomidine (PRECEDEX) IV infusion Stopped (03/26/22 1402)    dextrose 5% and 0.45% NaCl with KCl 20 mEq 100 mL/hr at 03/26/22 0125    sodium chloride           Electronically signed by Junior Fall RN on 3/26/22 at 6:58 AM EDT

## 2022-03-26 NOTE — PROGRESS NOTES
[facility]  Progress Note    3/26/2022 8:53 AM  Surgeon: Loc Mi    POD#1   S/P : Ela Lemus esophagectomy    Post op follow-up    Subjective:   No major complaint or event underwent surgical intervention yesterday tachycardic this morning     Hospital course:  3/26 sinus tachycardia blood pressure was controlled weaned off  Vital Signs: BP (!) 165/68   Pulse 126   Temp 99 °F (37.2 °C) (Core)   Resp 25   Ht 6' (1.829 m)   Wt 235 lb 10.8 oz (106.9 kg)   SpO2 95%   BMI 31.96 kg/m²  O2 Flow Rate (L/min): 6 L/min     I/O:    Intake/Output Summary (Last 24 hours) at 3/26/2022 0853  Last data filed at 3/26/2022 4216  Gross per 24 hour   Intake 2852.33 ml   Output 2598 ml   Net 254.33 ml       Exam:   Cardiovascular: S1 plus S2 sinus rhythm tachycardic    Pulmonary: Bilaterally clear no additional sound chest x-ray shows atelectatic lower lobe    Abdomen: Soft nondistended nontender bowel sounds positive    Urine output good urine output overnight    CT: Chest tube #1     serosanguineous no airleak         chest tube #2       serosanguineous no airleak     airleak    Radiology  Atelectatic right lower lobe      Labs:   CBC:   Recent Labs     03/25/22  1355 03/26/22  0411   WBC 10.0 12.0*   HGB 14.1 14.6   HCT 41.0 42.7   MCV 89.1 89.3    252     BMP:   Recent Labs     03/25/22  1355 03/26/22  0411   * 130*   K 3.9 4.1   CL 99 98*   CO2 22 20*   PHOS 3.5 3.1   BUN 19 17   CREATININE 0.9 0.7*     PT/INR: No results for input(s): PROTIME, INR in the last 72 hours. APTT: No results for input(s): APTT in the last 72 hours.     Scheduled Meds:    metoprolol  2.5 mg IntraVENous Q3H (SCHEDULED)    sodium chloride flush  5-40 mL IntraVENous 2 times per day    bacitracin   Topical Daily    famotidine  20 mg Oral BID    Or    famotidine (PEPCID) injection  20 mg IntraVENous BID    albuterol  2.5 mg Nebulization 4x daily    hydrALAZINE  5 mg IntraVENous Once     Continuous Infusions:    niCARdipene (CARDENE) 50 mg in 250 mL 0.9 % sodium chloride infusion Stopped (03/26/22 0645)    morphine      dexmedetomidine (PRECEDEX) IV infusion      dextrose 5% and 0.45% NaCl with KCl 20 mEq 100 mL/hr at 03/26/22 0125    sodium chloride           Patient Active Problem List   Diagnosis    NSTEMI (non-ST elevation myocardial infarction) [address] [address]    HTN (hypertension)    CAD (coronary artery disease)    HLD (hyperlipidemia)    Esophageal cancer (HCC)           Assessment  Status post Triston Jose esophagectomy  Plan:     1. PCA for pain control  2. Precedex for pain control  3. Out of bed as tolerated  4. Metoprolol 2.5 mg every 3 hours hold for heart rate less than 60  5.  Lesions 10 mg every 6      Loraine Middleton MD FACS

## 2022-03-26 NOTE — PROGRESS NOTES
Physical Therapy    Facility/Department: [facility] C2 CARD TELEMETRY  Initial Assessment and Treatment    NAME: Amna Sandoval  : 1950  MRN: [id number]    Date of Service: 3/26/2022    Discharge Recommendations:  Continue to assess pending progress   PT Equipment Recommendations  Other: continue to assess  If pt is unable to be seen after this session, please let this note serve as discharge summary. Please see case management note for discharge disposition. Thank you. Assessment   Body structures, Functions, Activity limitations: Decreased functional mobility ; Decreased ADL status; Decreased balance;Decreased posture;Decreased strength;Decreased endurance;Decreased cognition;Decreased safe awareness  Assessment: Patient is a 80year old male who was admitted to [facility] on 3/25/22. Patient received esophagectomy on 3/25/22. Patient is normally independent with functional mobility. Today the patient needed minimum assistance to stand. Additional ambulation was held due to patient's high heart rate. Patient is below his prior level of function and would benefit from skilled acute PT plan of care. Physical therapy will continue to assess patient and will provide formal post-acute discharge recommendations when the patient is more medically stable and closer to discharge. Anticipate that patients mobility will significantly improve when his pain, fatigue also improves. However if his strength does not signficantly improve he may need inpatient rehabilitation. PT to continue to follow  Treatment Diagnosis: decreased independence with functional mobility  Specific instructions for Next Treatment: progreess mobility as tolerated  Prognosis: Good  Decision Making: High Complexity  PT Education: Goals; General Safety;Gait Training;Disease Specific Education;Plan of Care; Functional Mobility Training;Precautions; Injury Prevention;Pressure Relief;PT Role;Transfer Training;Energy Conservation  Patient Education: Disease Specific Education: Patient educated on importance of OOB mobility, pursed lip breathing, prevention of complications of bedrest, and general safety during hospitalization. Patient verbalized understanding  Barriers to Learning: none  REQUIRES PT FOLLOW UP: Yes  Activity Tolerance  Activity Tolerance: Patient limited by fatigue;Patient limited by endurance; Patient limited by pain  Activity Tolerance: vitals at rest: 141/61 95% on 6L 120 BPM. HR increased to 140 BPM when standing. O2 91% with standing. BP dropped to 96/48 after standing. RN and MD aware of patient's increased heart rate. Patient Diagnosis(es): The encounter diagnosis was Malignant neoplasm of esophagus, unspecified location [facility]). has a past medical history of CAD (coronary artery disease), History of tobacco use, Hyperlipidemia, Hypertension, Plantar fasciitis, and Prostate cancer ([facility] [address] has a past surgical history that includes Prostatectomy (03/2013); Coronary angioplasty with stent; Percutaneous Transluminal Coronary Angio; Cardiac surgery (2015); Penile prosthesis placement (2018); and Esophagectomy (N/A, 3/25/2022).     Restrictions  Restrictions/Precautions  Restrictions/Precautions: General Precautions,Fall Risk,Up as Tolerated  Position Activity Restriction  Other position/activity restrictions: 2 chest tubes to suction, peñaloza, NG tube , O2 6L  Vision/Hearing  Vision: Impaired  Vision Exceptions: Wears glasses at all times  Hearing: Within functional limits     Subjective  General  Chart Reviewed: Yes  Patient assessed for rehabilitation services?: Yes  Additional Pertinent Hx: ESOPHAGOGASTRODUODENOSCOPY, DANIKA BHARAT ESOPHAGECTOMY WITH OPEN THORACOTOMY, BOTOX TO PYLORUS, INTERCOSTAL NERVE BLOCK WITH CRYOABLATION  on 3/25/22  Response To Previous Treatment: Not applicable  Family / Caregiver Present: No  Referring Practitioner: Purnima Dickerson MD  Referral Date : 03/25/22  General Comment  Comments: Seated in chair upon entry of therapy staff. Cleared for therapy by RN. Subjective  Subjective: Patient agreed to participate. Pain Screening  Patient Currently in Pain: Yes  Pain Assessment  Pain Assessment: 0-10  Pain Level: 7  Pain Type: Surgical pain;Acute pain  Pain Location: Abdomen  Pain Orientation: Right  Functional Pain Assessment: Prevents or interferes some active activities and ADLs  Non-Pharmaceutical Pain Intervention(s): Ambulation/Increased Activity;Repositioned; Rest  Response to Pain Intervention: Patient Satisfied  Vital Signs  Patient Currently in Pain: Yes  Pre Treatment Pain Screening  Intervention List: Patient able to continue with treatment    Orientation  Orientation  Overall Orientation Status: Within Functional Limits  Social/Functional History  Social/Functional History  Lives With: Spouse (spouse works from home, just had knee replacement month ago)  Type of Home: [address]: One level (1 step to get to dining room)  Home Access: Level entry  Bathroom Shower/Tub: Tub/Shower unit  Bathroom Toilet: Standard  Bathroom Equipment: [address]:  (no home equipment)  ADL Assistance: Independent  Homemaking Assistance: Independent  Ambulation Assistance: Independent  Transfer Assistance: Independent  Active : Yes  Occupation: Part time employment  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\"  Leisure & Hobbies: fish  Cognition   Cognition  Overall Cognitive Status: Exceptions  Arousal/Alertness: Delayed responses to stimuli (patient somewhat lethargic)  Following Commands: Follows multistep commands with repitition; Follows multistep commands with increased time  Attention Span: Attends with cues to redirect  Memory: Appears intact  Safety Judgement: Decreased awareness of need for safety  Problem Solving: Decreased awareness of errors  Insights: Decreased awareness of deficits  Initiation: Requires cues for some  Sequencing: Requires cues for some    Objective          PROM RLE (degrees)  RLE PROM: WNL  AROM RLE (degrees)  RLE AROM: WNL  PROM LLE (degrees)  LLE PROM: WNL  AROM LLE (degrees)  LLE AROM : WNL  Strength RLE  Comment: grossly 4+/5 bilateral  Strength LLE  Comment: grossly 4+/5 bilateral     Sensation  Overall Sensation Status: WNL  Bed mobility  Supine to Sit: Unable to assess  Sit to Supine: Unable to assess  Comment: patient in chair upon entry and exit of therapy  Transfers  Sit to Stand: Minimal Assistance  Stand to sit: Minimal Assistance  Bed to Chair: Unable to assess  Comment: cueing for safe hand placement. Ambulation  Ambulation?: No (not attempted due to patient's increased heart rate with standing.)     Balance  Posture: Fair  Sitting - Static: Fair  Sitting - Dynamic: Fair  Standing - Static: Poor;+  Standing - Dynamic: Poor;+  Comments: min assist needed to maintain standing balance. patient completed 1 x 10 standing marching with min assist.  Exercises  Gluteal Sets: 1 x 10 bilateral  Hip Flexion: 1 x 10 bilateral  Knee Long Arc Quad: 1 x 10 bilateral  Ankle Pumps: 1 x 10 bilateral  Comments: cueing for sequencing and technique. Other exercises  Other exercises?: Yes  Other exercises 1: standing marching 1 x 10 with min assist.     Plan   Plan  Times per week: 5-7/week  Plan weeks: 1 week 4/2/22  Specific instructions for Next Treatment: progreess mobility as tolerated  Current Treatment Recommendations: Strengthening,Safety Education & Training,Home Exercise Program,Patient/Caregiver Education & Training,Balance Training,Equipment Evaluation, Education, & procurement,Functional Mobility Training,Transfer Training,Gait Training,ADL/Self-care Training,Stair training,Positioning  Safety Devices  Type of devices:  All fall risk precautions in place,Call light within reach,Gait belt,Patient at risk for falls,Nurse notified,Left in chair (chair alarm off upon entry)    AM-PAC Score     AM-PAC Inpatient Mobility without Stair Climbing Raw Score : 12 (03/26/22 1002)  AM-PAC Inpatient without Stair Climbing T-Scale Score : 37.26 (03/26/22 1002)  Mobility Inpatient CMS 0-100% Score: 63.03 (03/26/22 1002)  Mobility Inpatient without Stair CMS G-Code Modifier : CL (03/26/22 1002)       Goals  Short term goals  Time Frame for Short term goals: 1 week 4/2/22  Short term goal 1: Supine <> sit with minimum assistance  Short term goal 2: Sit <> stand and bed <> chair with minimum assistance and least restrictive assistive device. Short term goal 3: Ambulate 50 feet with minimum assistance and least restrictive assistive device. Short term goal 4: Ascend 1 step with rail and minimum assistance. Short term goal 5: By 3/29/22 Patient will tolerate 12-15 reps of his exercises to maximize his strength/endurance  Patient Goals   Patient goals : To improve his pain. To go home.        Therapy Time   Individual Concurrent Group Co-treatment   Time In 0818         Time Out 0851         Minutes 33         Timed Code Treatment Minutes: 23 Minutes (10 minute evaluation)       Brigitte Piña PT

## 2022-03-27 ENCOUNTER — APPOINTMENT (OUTPATIENT)
Dept: GENERAL RADIOLOGY | Age: 72
DRG: 327 | End: 2022-03-27
Attending: THORACIC SURGERY (CARDIOTHORACIC VASCULAR SURGERY)
Payer: COMMERCIAL

## 2022-03-27 LAB
ANION GAP SERPL CALCULATED.3IONS-SCNC: 11 MMOL/L (ref 3–16)
BASE EXCESS ARTERIAL: -1.7 MMOL/L (ref -3–3)
BASOPHILS ABSOLUTE: 0 K/UL (ref 0–0.2)
BASOPHILS RELATIVE PERCENT: 0.2 %
BUN BLDV-MCNC: 27 MG/DL (ref 7–20)
CALCIUM SERPL-MCNC: 8.3 MG/DL (ref 8.3–10.6)
CARBOXYHEMOGLOBIN ARTERIAL: 0.6 % (ref 0–1.5)
CHLORIDE BLD-SCNC: 98 MMOL/L (ref 99–110)
CO2: 21 MMOL/L (ref 21–32)
CREAT SERPL-MCNC: 0.9 MG/DL (ref 0.8–1.3)
EOSINOPHILS ABSOLUTE: 0 K/UL (ref 0–0.6)
EOSINOPHILS RELATIVE PERCENT: 0.2 %
GFR AFRICAN AMERICAN: >60
GFR NON-AFRICAN AMERICAN: >60
GLUCOSE BLD-MCNC: 131 MG/DL (ref 70–99)
HCO3 ARTERIAL: 21.6 MMOL/L (ref 21–29)
HCT VFR BLD CALC: 40.1 % (ref 40.5–52.5)
HEMATOLOGY PATH CONSULT: YES
HEMOGLOBIN, ART, EXTENDED: 14.6 G/DL (ref 13.5–17.5)
HEMOGLOBIN: 13.5 G/DL (ref 13.5–17.5)
LYMPHOCYTES ABSOLUTE: 1.2 K/UL (ref 1–5.1)
LYMPHOCYTES RELATIVE PERCENT: 8.8 %
MAGNESIUM: 1.7 MG/DL (ref 1.8–2.4)
MCH RBC QN AUTO: 30.2 PG (ref 26–34)
MCHC RBC AUTO-ENTMCNC: 33.7 G/DL (ref 31–36)
MCV RBC AUTO: 89.8 FL (ref 80–100)
METHEMOGLOBIN ARTERIAL: 0.7 %
MONOCYTES ABSOLUTE: 2.1 K/UL (ref 0–1.3)
MONOCYTES RELATIVE PERCENT: 15.9 %
NEUTROPHILS ABSOLUTE: 10 K/UL (ref 1.7–7.7)
NEUTROPHILS RELATIVE PERCENT: 74.9 %
O2 SAT, ARTERIAL: 94.9 %
O2 THERAPY: ABNORMAL
PCO2 ARTERIAL: 32.7 MMHG (ref 35–45)
PDW BLD-RTO: 13.2 % (ref 12.4–15.4)
PH ARTERIAL: 7.44 (ref 7.35–7.45)
PLATELET # BLD: 232 K/UL (ref 135–450)
PMV BLD AUTO: 5.4 FL (ref 5–10.5)
PO2 ARTERIAL: 71.2 MMHG (ref 75–108)
POTASSIUM SERPL-SCNC: 4.7 MMOL/L (ref 3.5–5.1)
RBC # BLD: 4.47 M/UL (ref 4.2–5.9)
SODIUM BLD-SCNC: 130 MMOL/L (ref 136–145)
TCO2 ARTERIAL: 22.6 MMOL/L
WBC # BLD: 13.4 K/UL (ref 4–11)

## 2022-03-27 PROCEDURE — 6360000002 HC RX W HCPCS: Performed by: THORACIC SURGERY (CARDIOTHORACIC VASCULAR SURGERY)

## 2022-03-27 PROCEDURE — 94761 N-INVAS EAR/PLS OXIMETRY MLT: CPT

## 2022-03-27 PROCEDURE — 2500000003 HC RX 250 WO HCPCS: Performed by: THORACIC SURGERY (CARDIOTHORACIC VASCULAR SURGERY)

## 2022-03-27 PROCEDURE — A4216 STERILE WATER/SALINE, 10 ML: HCPCS | Performed by: THORACIC SURGERY (CARDIOTHORACIC VASCULAR SURGERY)

## 2022-03-27 PROCEDURE — 51798 US URINE CAPACITY MEASURE: CPT

## 2022-03-27 PROCEDURE — 6370000000 HC RX 637 (ALT 250 FOR IP): Performed by: THORACIC SURGERY (CARDIOTHORACIC VASCULAR SURGERY)

## 2022-03-27 PROCEDURE — 97110 THERAPEUTIC EXERCISES: CPT

## 2022-03-27 PROCEDURE — 71045 X-RAY EXAM CHEST 1 VIEW: CPT

## 2022-03-27 PROCEDURE — 83735 ASSAY OF MAGNESIUM: CPT

## 2022-03-27 PROCEDURE — 93005 ELECTROCARDIOGRAM TRACING: CPT | Performed by: THORACIC SURGERY (CARDIOTHORACIC VASCULAR SURGERY)

## 2022-03-27 PROCEDURE — 94640 AIRWAY INHALATION TREATMENT: CPT

## 2022-03-27 PROCEDURE — 97116 GAIT TRAINING THERAPY: CPT

## 2022-03-27 PROCEDURE — 99024 POSTOP FOLLOW-UP VISIT: CPT | Performed by: THORACIC SURGERY (CARDIOTHORACIC VASCULAR SURGERY)

## 2022-03-27 PROCEDURE — 2700000000 HC OXYGEN THERAPY PER DAY

## 2022-03-27 PROCEDURE — 82803 BLOOD GASES ANY COMBINATION: CPT

## 2022-03-27 PROCEDURE — 37799 UNLISTED PX VASCULAR SURGERY: CPT

## 2022-03-27 PROCEDURE — 80048 BASIC METABOLIC PNL TOTAL CA: CPT

## 2022-03-27 PROCEDURE — 2000000000 HC ICU R&B

## 2022-03-27 PROCEDURE — 94669 MECHANICAL CHEST WALL OSCILL: CPT

## 2022-03-27 PROCEDURE — 85025 COMPLETE CBC W/AUTO DIFF WBC: CPT

## 2022-03-27 PROCEDURE — 2580000003 HC RX 258: Performed by: THORACIC SURGERY (CARDIOTHORACIC VASCULAR SURGERY)

## 2022-03-27 PROCEDURE — P9045 ALBUMIN (HUMAN), 5%, 250 ML: HCPCS | Performed by: THORACIC SURGERY (CARDIOTHORACIC VASCULAR SURGERY)

## 2022-03-27 PROCEDURE — 97530 THERAPEUTIC ACTIVITIES: CPT

## 2022-03-27 RX ORDER — METOPROLOL TARTRATE 5 MG/5ML
5 INJECTION INTRAVENOUS
Status: ACTIVE | OUTPATIENT
Start: 2022-03-27 | End: 2022-03-27

## 2022-03-27 RX ORDER — METOPROLOL TARTRATE 5 MG/5ML
5 INJECTION INTRAVENOUS
Status: DISCONTINUED | OUTPATIENT
Start: 2022-03-27 | End: 2022-03-29

## 2022-03-27 RX ORDER — ALBUMIN, HUMAN INJ 5% 5 %
25 SOLUTION INTRAVENOUS ONCE
Status: COMPLETED | OUTPATIENT
Start: 2022-03-27 | End: 2022-03-27

## 2022-03-27 RX ORDER — HYDRALAZINE HYDROCHLORIDE 20 MG/ML
5 INJECTION INTRAMUSCULAR; INTRAVENOUS EVERY 4 HOURS PRN
Status: DISCONTINUED | OUTPATIENT
Start: 2022-03-27 | End: 2022-03-31 | Stop reason: HOSPADM

## 2022-03-27 RX ORDER — DIGOXIN 0.25 MG/ML
125 INJECTION INTRAMUSCULAR; INTRAVENOUS ONCE
Status: COMPLETED | OUTPATIENT
Start: 2022-03-27 | End: 2022-03-27

## 2022-03-27 RX ORDER — METOPROLOL TARTRATE 5 MG/5ML
2.5 INJECTION INTRAVENOUS ONCE
Status: COMPLETED | OUTPATIENT
Start: 2022-03-27 | End: 2022-03-27

## 2022-03-27 RX ORDER — MAGNESIUM SULFATE IN WATER 40 MG/ML
4000 INJECTION, SOLUTION INTRAVENOUS ONCE
Status: COMPLETED | OUTPATIENT
Start: 2022-03-27 | End: 2022-03-28

## 2022-03-27 RX ADMIN — METOPROLOL TARTRATE 5 MG: 1 INJECTION, SOLUTION INTRAVENOUS at 13:04

## 2022-03-27 RX ADMIN — MORPHINE SULFATE 1 MG: 1 INJECTION INTRAVENOUS at 15:04

## 2022-03-27 RX ADMIN — BACITRACIN: 500 OINTMENT TOPICAL at 09:00

## 2022-03-27 RX ADMIN — METOPROLOL TARTRATE 2.5 MG: 1 INJECTION, SOLUTION INTRAVENOUS at 20:23

## 2022-03-27 RX ADMIN — DEXTROSE 1 MG/MIN: 5 SOLUTION INTRAVENOUS at 16:30

## 2022-03-27 RX ADMIN — METOPROLOL TARTRATE 5 MG: 1 INJECTION, SOLUTION INTRAVENOUS at 16:35

## 2022-03-27 RX ADMIN — DEXTROSE MONOHYDRATE 75 MG: 5 INJECTION, SOLUTION INTRAVENOUS at 18:46

## 2022-03-27 RX ADMIN — METOPROLOL TARTRATE 5 MG: 1 INJECTION, SOLUTION INTRAVENOUS at 19:02

## 2022-03-27 RX ADMIN — ALBUMIN (HUMAN) 25 G: 12.5 INJECTION, SOLUTION INTRAVENOUS at 18:55

## 2022-03-27 RX ADMIN — DEXTROSE MONOHYDRATE 75 MG: 5 INJECTION, SOLUTION INTRAVENOUS at 20:46

## 2022-03-27 RX ADMIN — FUROSEMIDE 20 MG: 10 INJECTION, SOLUTION INTRAMUSCULAR; INTRAVENOUS at 13:06

## 2022-03-27 RX ADMIN — DIGOXIN 125 MCG: 250 INJECTION, SOLUTION INTRAMUSCULAR; INTRAVENOUS; PARENTERAL at 20:21

## 2022-03-27 RX ADMIN — ALBUTEROL SULFATE 2.5 MG: 2.5 SOLUTION RESPIRATORY (INHALATION) at 11:16

## 2022-03-27 RX ADMIN — POTASSIUM CHLORIDE, DEXTROSE MONOHYDRATE AND SODIUM CHLORIDE: 150; 5; 450 INJECTION, SOLUTION INTRAVENOUS at 04:01

## 2022-03-27 RX ADMIN — FAMOTIDINE 20 MG: 10 INJECTION, SOLUTION INTRAVENOUS at 20:24

## 2022-03-27 RX ADMIN — MAGNESIUM SULFATE HEPTAHYDRATE 4000 MG: 40 INJECTION, SOLUTION INTRAVENOUS at 20:42

## 2022-03-27 RX ADMIN — DEXTROSE MONOHYDRATE 150 MG: 5 INJECTION, SOLUTION INTRAVENOUS at 16:13

## 2022-03-27 RX ADMIN — FUROSEMIDE 20 MG: 10 INJECTION, SOLUTION INTRAMUSCULAR; INTRAVENOUS at 08:09

## 2022-03-27 RX ADMIN — FUROSEMIDE 20 MG: 10 INJECTION, SOLUTION INTRAMUSCULAR; INTRAVENOUS at 20:23

## 2022-03-27 RX ADMIN — METOPROLOL TARTRATE 2.5 MG: 1 INJECTION, SOLUTION INTRAVENOUS at 00:38

## 2022-03-27 RX ADMIN — MORPHINE SULFATE 30 MG: 1 INJECTION INTRAVENOUS at 00:23

## 2022-03-27 RX ADMIN — POTASSIUM CHLORIDE, DEXTROSE MONOHYDRATE AND SODIUM CHLORIDE: 150; 5; 450 INJECTION, SOLUTION INTRAVENOUS at 20:38

## 2022-03-27 RX ADMIN — AMIODARONE HYDROCHLORIDE 1 MG/MIN: 50 INJECTION, SOLUTION INTRAVENOUS at 23:30

## 2022-03-27 RX ADMIN — SODIUM CHLORIDE, PRESERVATIVE FREE 10 ML: 5 INJECTION INTRAVENOUS at 08:09

## 2022-03-27 RX ADMIN — METOPROLOL TARTRATE 2.5 MG: 1 INJECTION, SOLUTION INTRAVENOUS at 04:13

## 2022-03-27 RX ADMIN — METOPROLOL TARTRATE 2.5 MG: 1 INJECTION, SOLUTION INTRAVENOUS at 06:25

## 2022-03-27 RX ADMIN — FAMOTIDINE 20 MG: 10 INJECTION, SOLUTION INTRAVENOUS at 08:09

## 2022-03-27 RX ADMIN — METOPROLOL TARTRATE 2.5 MG: 1 INJECTION, SOLUTION INTRAVENOUS at 08:00

## 2022-03-27 RX ADMIN — ALBUTEROL SULFATE 2.5 MG: 2.5 SOLUTION RESPIRATORY (INHALATION) at 07:15

## 2022-03-27 RX ADMIN — SODIUM CHLORIDE, PRESERVATIVE FREE 10 ML: 5 INJECTION INTRAVENOUS at 20:25

## 2022-03-27 RX ADMIN — METOPROLOL TARTRATE 2.5 MG: 5 INJECTION INTRAVENOUS at 09:47

## 2022-03-27 ASSESSMENT — PAIN DESCRIPTION - LOCATION
LOCATION: ABDOMEN

## 2022-03-27 ASSESSMENT — PAIN DESCRIPTION - ONSET
ONSET: ON-GOING

## 2022-03-27 ASSESSMENT — PAIN DESCRIPTION - PROGRESSION

## 2022-03-27 ASSESSMENT — PAIN SCALES - GENERAL
PAINLEVEL_OUTOF10: 6
PAINLEVEL_OUTOF10: 7
PAINLEVEL_OUTOF10: 4
PAINLEVEL_OUTOF10: 6
PAINLEVEL_OUTOF10: 6
PAINLEVEL_OUTOF10: 5

## 2022-03-27 ASSESSMENT — PAIN DESCRIPTION - PAIN TYPE
TYPE: ACUTE PAIN;SURGICAL PAIN
TYPE: ACUTE PAIN

## 2022-03-27 ASSESSMENT — PAIN DESCRIPTION - FREQUENCY
FREQUENCY: CONTINUOUS

## 2022-03-27 ASSESSMENT — PAIN - FUNCTIONAL ASSESSMENT
PAIN_FUNCTIONAL_ASSESSMENT: PREVENTS OR INTERFERES SOME ACTIVE ACTIVITIES AND ADLS

## 2022-03-27 ASSESSMENT — PAIN DESCRIPTION - ORIENTATION
ORIENTATION: RIGHT

## 2022-03-27 ASSESSMENT — PAIN DESCRIPTION - DESCRIPTORS
DESCRIPTORS: DISCOMFORT;CONSTANT
DESCRIPTORS: DISCOMFORT;CONSTANT

## 2022-03-27 ASSESSMENT — ENCOUNTER SYMPTOMS: TACHYPNEA: 1

## 2022-03-27 NOTE — PROGRESS NOTES
Shift: 0700/1900    Nursing assessment at handoff  stable    Rhythm changes Normal Sinus Rhythm/Sinus Tachycardia     Rhythm Intervention     Drop in Urinary Output no    Changes to drips? no    POD 1 ONLY: Pacing Wires Removed: []Yes           [] NO        [] Platelets < 30,927        [] Arrhythmia        [] Bradycardia        [] Valve Replacement         [] Pacing for Cardiac Index    LDA Insertion Date Date Changed (if needed) Discontinued Date   Art line 3/25/22     Central Line 3/25/22     ET Tube      Moseley 3/25/22     Chest Tube 3/25/22     Leg Drain      Pacing Wires        Surgery, return to OR no     Hospital Course:  POD# 1 Night: Pt remained stable overnight. Pt was diaphoretic and had some SOB when getting up in the chair. Increased oxygen from 4L to 7L. 1230ml of u/o. Nicardipine gtt is off. POD# 1 Day (3/26)  Pt. Briefly restarted on nicardipine gtt @ 2.5mg/hr until lopressor 2.5mg Q3h was started. Pt. BP and HR well maintained within parameters on lopressor. Precedex gtt started around 0900 at .4 mcg/kg and titrated off by 1400 due to pt becoming overly lethargic. Upon initial assessment pt was breathing short and shallow  breathes due to severe pain. PCA pump with morphine was started 1mg/ml. Initial lockout was 1mg/10 mins up to 6mg/hr. Due to increased lethargy lockout changed to 1mg/15 mins up to 4mg/hr. Pt had decreased U/O over shift and was given 20mg of lasix. Lasix had little affect on U/O so pt was given 5% albumin 250ml. Pt is A&O x4 showing SR/ST on the monitor w/ well maintained SPO2 on 6L nasal canula. Pt still experiences dyspnea, HTN, and tachycardia w/ exertion. Chest tube sites dry, clean and intact. U/O increased w/ second dose of lasix. POD #2 PM: Pt's vitals were stable overnight. Tolerated getting up in the chair better than previous night. Pain is better controlled with PCA pump and oxygen requirement remains the same at 5L NC.    POD #2 (3/27  7A - 7P)    Pt. A&O X4 showing SR/ST on the monitor w/ well maintained SPO2 on 5L nasal canula. L. Radial Art line DC and peñaloza DC. Lopressor dose increased from 2.5mg to 5mg IV and PRN hydralazine 5mg was added for HTN. Pt. Continues to have mild tachycardia even w/ increase in lopressor dose. Chest tubes switched to water seal w/ continued seroussanguinous output. Chest tube dressing dry,clean and intact. Air leak no longer noted in atrium #1. NG tube continued on low - intermittent suction w/ little to no output. All surgical sites well proximated and unremarkable. Pt. did not void 6 hrs after peñaloza was removed and was bladder scanned for a total of 538ml. Peñaloza catheter placed around 1500 per Dr. Anna Juan order. During placement of catheter pt went into Afib- RVR HR in the 180's. EKG obtained confirming rhythm, Dr. Kieran Hopkins notified by [facility] and CTS arrhythmic treatment order set was initiated per protocol. Pt received 150mg Amio bolus and started continues 450mg amio gtt running at 33.3 ml/hr. Pt. HR has decreased into the 130's-140's for about 45 mins and then increased back into the 170's-180's. Dr. Leon Covert rounded around [phone] and was updated on the pt. SBP dropped into the 90's and Albumin 5% 250ml X2 was ordered. An additional 75mg bolus of amio was ordered and 5mg IVP of lopressor was given once SBP was >120. Pt. BP stable at end of shift w/ pt maintained in Afib-RVR HR in the 140's.       Changes in O2 requirements Oxygen Therapy  SpO2: 95 %  Pulse Oximeter Device Mode: Continuous  Pulse Oximeter Device Location: Finger  O2 Device: Nasal cannula  O2 Flow Rate (L/min): 5 L/min  End Tidal CO2: 21 (%)     Most recent vitals BP (!) 149/83   Pulse 104   Temp 98.9 °F (37.2 °C) (Oral)   Resp 16   Ht 6' (1.829 m)   Wt 235 lb 6.4 oz (106.8 kg)   SpO2 95%   BMI 31.93 kg/m²       Admission weight Weight: 230 lb (104.3 kg)     Today's weight   Wt Readings from Last 1 Encounters:   03/27/22 235 lb 6.4 oz (106.8 kg) Lab Data:  CBC:   Recent Labs     03/27/22  0405   WBC 13.4*   HGB 13.5   HCT 40.1*   MCV 89.8        BMP:    Recent Labs     03/27/22  0405   *   K 4.7   CO2 21   BUN 27*   CREATININE 0.9     LIVR: No results for input(s): AST, ALT in the last 72 hours. PT/INR: No results for input(s): PROT, INR in the last 72 hours. APTT: No results for input(s): APTT in the last 72 hours.     Drip rates at handoff:    niCARdipene (CARDENE) 50 mg in 250 mL 0.9 % sodium chloride infusion Stopped (03/26/22 0640)    morphine      dexmedetomidine (PRECEDEX) IV infusion Stopped (03/26/22 1402)    dextrose 5% and 0.45% NaCl with KCl 20 mEq 100 mL/hr at 03/27/22 0401    sodium chloride           Electronically signed by Miri Ashraf RN on 3/26/22 at 6:58 AM EDT

## 2022-03-27 NOTE — PROGRESS NOTES
[facility]  Progress Note    3/27/2022 9:29 AM  Surgeon: Emmanuel Boyer    POD#1   S/P : Jones  esophagectomy    Post op follow-up    Subjective:   No major complaint or event underwent surgical intervention yesterday tachycardic this morning     Hospital course:  3/26 sinus tachycardia blood pressure was controlled weaned off  3/27 no major complaint or event overnight pain is well controlled diuresing well    Vital Signs: BP (!) 145/77   Pulse 110   Temp 98.9 °F (37.2 °C) (Oral)   Resp 21   Ht 6' (1.829 m)   Wt 235 lb 6.4 oz (106.8 kg)   SpO2 95%   BMI 31.93 kg/m²  O2 Flow Rate (L/min): 5 L/min     I/O:      Intake/Output Summary (Last 24 hours) at 3/27/2022 0929  Last data filed at 3/27/2022 0800  Gross per 24 hour   Intake 3508.52 ml   Output 1960 ml   Net 1548.52 ml       Exam:   Cardiovascular: S1 plus S2 sinus rhythm tachycardic    Pulmonary: Bilaterally clear no additional sound chest x-ray shows atelectatic lower lobe    Abdomen: Soft nondistended nontender bowel sounds positive    Urine output good urine output overnight    CT: Chest tube #1     serosanguineous no airleak         chest tube #2       serosanguineous no airleak     airleak    Radiology  Atelectatic right lower lobe      Labs:   CBC:   Recent Labs     03/25/22  1355 03/26/22  0411 03/27/22  0405   WBC 10.0 12.0* 13.4*   HGB 14.1 14.6 13.5   HCT 41.0 42.7 40.1*   MCV 89.1 89.3 89.8    252 232     BMP:   Recent Labs     03/25/22  1355 03/26/22  0411 03/27/22  0405   * 130* 130*   K 3.9 4.1 4.7   CL 99 98* 98*   CO2 22 20* 21   PHOS 3.5 3.1  --    BUN 19 17 27*   CREATININE 0.9 0.7* 0.9     PT/INR: No results for input(s): PROTIME, INR in the last 72 hours. APTT: No results for input(s): APTT in the last 72 hours.     Scheduled Meds:    metoprolol  5 mg IntraVENous Q3H (SCHEDULED)    metoprolol  2.5 mg IntraVENous Once    furosemide  20 mg IntraVENous 4x Daily    sodium chloride flush  5-40 mL IntraVENous 2 times per day    bacitracin   Topical Daily    famotidine  20 mg Oral BID    Or    famotidine (PEPCID) injection  20 mg IntraVENous BID    albuterol  2.5 mg Nebulization 4x daily    hydrALAZINE  5 mg IntraVENous Once     Continuous Infusions:    niCARdipene (CARDENE) 50 mg in 250 mL 0.9 % sodium chloride infusion Stopped (03/26/22 0640)    morphine      dexmedetomidine (PRECEDEX) IV infusion Stopped (03/26/22 1402)    dextrose 5% and 0.45% NaCl with KCl 20 mEq 100 mL/hr at 03/27/22 0401    sodium chloride           Patient Active Problem List   Diagnosis    NSTEMI (non-ST elevation myocardial infarction) ([facility] [address]    HTN (hypertension)    CAD (coronary artery disease)    HLD (hyperlipidemia)    Esophageal cancer (HCC)           Assessment  Status post Triston Jose esophagectomy  Plan:   DC Moseley catheter  DC arterial line  Increase metoprolol to 5 mg  Hydralazine as needed for blood pressure above 140  Continue with diuresis    Indio Myles MD FACS

## 2022-03-27 NOTE — PROGRESS NOTES
Shift: 0700/1900    Nursing assessment at handoff  stable    Rhythm changes Normal Sinus Rhythm/Sinus Tachycardia     Rhythm Intervention     Drop in Urinary Output no    Changes to drips? no    POD 1 ONLY: Pacing Wires Removed: []Yes           [] NO        [] Platelets < 94,263        [] Arrhythmia        [] Bradycardia        [] Valve Replacement         [] Pacing for Cardiac Index    LDA Insertion Date Date Changed (if needed) Discontinued Date   Art line 3/25/22     Central Line 3/25/22     ET Tube      Moseley 3/25/22     Chest Tube 3/25/22     Leg Drain      Pacing Wires        Surgery, return to OR no     Hospital Course:  POD# 1 Night: Pt remained stable overnight. Pt was diaphoretic and had some SOB when getting up in the chair. Increased oxygen from 4L to 7L. 1230ml of u/o. Nicardipine gtt is off. POD# 1 Day (3/26)  Pt. Briefly restarted on nicardipine gtt @ 2.5mg/hr until lopressor 2.5mg Q3h was started. Pt. BP and HR well maintained within parameters on lopressor. Precedex gtt started around 0900 at .4 mcg/kg and titrated off by 1400 due to pt becoming overly lethargic. Upon initial assessment pt was breathing short and shallow  breathes due to severe pain. PCA pump with morphine was started 1mg/ml. Initial lockout was 1mg/10 mins up to 6mg/hr. Due to increased lethargy lockout changed to 1mg/15 mins up to 4mg/hr. Pt had decreased U/O over shift and was given 20mg of lasix. Lasix had little affect on U/O so pt was given 5% albumin 250ml. Pt is A&O x4 showing SR/ST on the monitor w/ well maintained SPO2 on 6L nasal canula. Pt still experiences dyspnea, HTN, and tachycardia w/ exertion. Chest tube sites dry, clean and intact. U/O increased w/ second dose of lasix. POD #2 PM: Pt's vitals were stable overnight. Tolerated getting up in the chair better than previous night. Pain is better controlled with PCA pump and oxygen requirement remains the same at 5L NC.     Changes in O2 requirements Oxygen Therapy  SpO2: 96 %  Pulse Oximeter Device Mode: Continuous  Pulse Oximeter Device Location: Finger  O2 Device: Nasal cannula  O2 Flow Rate (L/min): 5 L/min  End Tidal CO2: 27 (%)     Most recent vitals /76   Pulse 88   Temp 99.8 °F (37.7 °C) (Core)   Resp 13   Ht 6' (1.829 m)   Wt 235 lb 6.4 oz (106.8 kg)   SpO2 96%   BMI 31.93 kg/m²       Admission weight Weight: 230 lb (104.3 kg)     Today's weight   Wt Readings from Last 1 Encounters:   03/27/22 235 lb 6.4 oz (106.8 kg)         Lab Data:  CBC:   Recent Labs     03/27/22  0405   WBC 13.4*   HGB 13.5   HCT 40.1*   MCV 89.8        BMP:    Recent Labs     03/27/22  0405   *   K 4.7   CO2 21   BUN 27*   CREATININE 0.9     LIVR: No results for input(s): AST, ALT in the last 72 hours. PT/INR: No results for input(s): PROT, INR in the last 72 hours. APTT: No results for input(s): APTT in the last 72 hours.     Drip rates at handoff:    niCARdipene (CARDENE) 50 mg in 250 mL 0.9 % sodium chloride infusion Stopped (03/26/22 0640)    morphine      dexmedetomidine (PRECEDEX) IV infusion Stopped (03/26/22 1402)    dextrose 5% and 0.45% NaCl with KCl 20 mEq 100 mL/hr at 03/27/22 0401    sodium chloride           Electronically signed by Anna Arteaga RN on 3/26/22 at 6:58 AM EDT

## 2022-03-27 NOTE — PROGRESS NOTES
Patient appears to have converted to AF RVR with max rate 205. Hemodynamically stable at this time /86 (102). Amio protocol ordered. Awaiting from Rx. EKG in progress to confirm AF.

## 2022-03-27 NOTE — PROGRESS NOTES
Pt with urinary retention s/p peñaloza removal earlier this shift. Pt unable to void. Bladder scan performed by primary RN and showed >500ml urine. Discussed with Dr. Nguyễn Doran. Per Dr. Nguyễn Doran OK to replace peñaloza catheter. Writer notified primary RN (Lele). Order to place peñaloza placed.

## 2022-03-27 NOTE — PROGRESS NOTES
Writer spoke with Dr. Eliseo Bryan bedside at this time re: on-going Afib RVR 's despite Amio protocol and scheduled Metoprolol IVP. Pt also noted with decreasing BP /61 (74) when compared to prior. Pt primary nurse [facility]) present during this and aware. Per Dr. Eliseo Bryan OK for Amio 75mg re-bolus, Albumin 250ml x 2 and OK to give additional Metoprolol 5mg IVP 20 min after Amio bolus if HR remains elevated and SBP > 120. Orders placed by writer at this time. Pt primary nurse [facility]) aware of plan.

## 2022-03-27 NOTE — PROGRESS NOTES
Physical Therapy  Facility/Department: [facility] C2 CARD TELEMETRY  Daily Treatment Note  NAME: Ailyn Herrera  : 1950  MRN: [id number]    Date of Service: 3/27/2022    Discharge Recommendations:  Continue to assess pending progress   PT Equipment Recommendations  Other: continue to assess  If pt is unable to be seen after this session, please let this note serve as discharge summary. Please see case management note for discharge disposition. Thank you. Assessment   Body structures, Functions, Activity limitations: Decreased functional mobility ; Decreased ADL status; Decreased balance;Decreased posture;Decreased strength;Decreased endurance;Decreased cognition;Decreased safe awareness  Assessment: Patient is making progress with his therapy goals. Today the patient completed sit <> stand transfer with CGA. He also ambulated up to 3 feet with a rollator walker and CGA. Additional ambulation was limited by the length of the patient's NGT and his HR increased to 118 with ambulation. Patient is below his prior level of function and would benefit from skilled acute PT plan of care. Physical therapy will continue to assess patient and will provide formal post-acute discharge recommendations when the patient is more medically stable and closer to discharge. Anticipate that patients mobility will significantly improve when he is connected to fewer lines/tubes. However if his strength does not signficantly improve he may need inpatient rehabilitation. PT to continue to follow  Treatment Diagnosis: decreased independence with functional mobility  Specific instructions for Next Treatment: progreess mobility as tolerated  Prognosis: Good  Decision Making: High Complexity  PT Education: Goals; General Safety;Gait Training;Disease Specific Education;Plan of Care; Functional Mobility Training;Precautions; Injury Prevention;Pressure Relief;PT Role;Transfer Training;Energy Conservation  Patient Education: Disease Specific Education: Patient educated on importance of OOB mobility, pursed lip breathing, prevention of complications of bedrest, and general safety during hospitalization. Patient verbalized understanding  Barriers to Learning: none  REQUIRES PT FOLLOW UP: Yes  Activity Tolerance  Activity Tolerance: Patient Tolerated treatment well  Activity Tolerance: Vitals:  110 BPM 94% on 5L. 129/79 HR increased to 118 with ambulation. Patient Diagnosis(es): The encounter diagnosis was Malignant neoplasm of esophagus, unspecified location [facility]). has a past medical history of CAD (coronary artery disease), History of tobacco use, Hyperlipidemia, Hypertension, Plantar fasciitis, and Prostate cancer ([facility] [address] has a past surgical history that includes Prostatectomy (03/2013); Coronary angioplasty with stent; Percutaneous Transluminal Coronary Angio; Cardiac surgery (2015); Penile prosthesis placement (2018); and Esophagectomy (N/A, 3/25/2022). Restrictions  Restrictions/Precautions  Restrictions/Precautions: General Precautions,Fall Risk,Up as Tolerated  Position Activity Restriction  Other position/activity restrictions: 2 chest tubes, NG tube (can now be clamped intermittently per RN Lele), O2 5L PCA pump. Subjective   General  Chart Reviewed: Yes  Additional Pertinent Hx: ESOPHAGOGASTRODUODENOSCOPY, DANIKA BHARAT ESOPHAGECTOMY WITH OPEN THORACOTOMY, BOTOX TO PYLORUS, INTERCOSTAL NERVE BLOCK WITH CRYOABLATION  on 3/25/22  Response To Previous Treatment: Patient reporting fatigue but able to participate. Family / Caregiver Present: No  Referring Practitioner: Rufino Villalba MD  Subjective  Subjective: Patient agreed to participate. General Comment  Comments: Seated in chair upon entry of therapy staff. Cleared for therapy by RN.   Pain Screening  Patient Currently in Pain: Yes  Pain Assessment  Pain Assessment: 0-10  Pain Level: 7  Pain Type: Acute pain  Pain Location: Abdomen  Functional Pain Assessment: Prevents or interferes some active activities and ADLs  Non-Pharmaceutical Pain Intervention(s): Ambulation/Increased Activity; Emotional support;Repositioned; Rest  Response to Pain Intervention: Patient Satisfied  Vital Signs  Patient Currently in Pain: Yes       Orientation  Orientation  Overall Orientation Status: Within Functional Limits  Cognition   Cognition  Overall Cognitive Status: Exceptions  Arousal/Alertness: Appropriate responses to stimuli  Following Commands: Follows one step commands consistently  Attention Span: Appears intact  Memory: Appears intact  Safety Judgement: Decreased awareness of need for safety  Insights: Decreased awareness of deficits  Initiation: Does not require cues  Sequencing: Does not require cues  Objective   Bed mobility  Supine to Sit: Unable to assess  Sit to Supine: Unable to assess  Comment: patient in chair at beginning and end of session. Transfers  Sit to Stand: Contact guard assistance  Stand to sit: Contact guard assistance  Bed to Chair: Contact guard assistance  Comment: cueing for safe hand placement. Ambulation  Ambulation?: Yes  More Ambulation?: No  Ambulation 1  Surface: level tile  Device: Rollator  Other Apparatus:  (5L of O2, PCA pump, NGT, 2 chest tubes)  Assistance: Contact guard assistance  Quality of Gait: forward flexed posture, cueing to correct. cueing to maintain base of support closer to rollator walker. no overt loss of balance. Gait Deviations: Slow Brenda;Decreased step length;Decreased step height  Distance: 3 feet forward/backward (repeated 4 times) total distance limited by NGT length)  Comments: No complaints of shortness of breath, chest pain or dizziness.   Stairs/Curb  Stairs?: No (not safe to attempt)     Balance  Posture: Fair  Sitting - Static: Fair  Sitting - Dynamic: Fair  Standing - Static: Fair  Standing - Dynamic: Fair  Comments: patient completed 1 x 10 standing marching with CGA  Exercises  Hip Flexion: 1 x 15 bilateral  Hip Abduction: 1 x 15 alarm off upon entry)     Therapy Time   Individual Concurrent Group Co-treatment   Time In 1012         Time Out 1035         Minutes 23         Timed Code Treatment Minutes: 23 Minutes       Martha Benavides, PT

## 2022-03-27 NOTE — PROGRESS NOTES
Occupational Therapy  Facility/Department: [facility] C2 CARD TELEMETRY  Daily Treatment Note  NAME: Cherelle Jackson  : 1950  MRN: [id number]    Date of Service: 3/27/2022    Discharge Recommendations:  24 hour supervision or assist       Assessment   Performance deficits / Impairments: Decreased functional mobility ; Decreased endurance;Decreased ADL status; Decreased balance;Decreased strength;Decreased posture  Assessment: pt normally independent with IADL's & functional mobility without AD, working part-time; admitted with esophageal Ca s/pESOPHAGOGASTRODUODENOSCOPY, DANIKA BHARAT ESOPHAGECTOMY WITH OPEN THORACOTOMY 3-25-22, now NPO with NG to suction, chest tubes x 2, ICU monitering, PCA pump on 5 L O 2; pt requiring min assist with sit<-->stand transfers from chair, max assist with UE/LE self care d/t multiple lines; pt cooperative, with good social support , continues to benefit from skilled OT services  OT Education: OT Role;Plan of Care;Precautions  Patient Education: Disease specific: importance of use of RED/nurse call light for ADL needs/assist with transfers/repositioning  Barriers to Learning: none perceived  REQUIRES OT FOLLOW UP: Yes  Activity Tolerance  Activity Tolerance: Patient Tolerated treatment well  Activity Tolerance: vitals stable on 5 L O 2, HR = 108-116 with exercise/sit<-->stand  Safety Devices  Safety Devices in place: Yes  Type of devices: Call light within reach; Left in chair;Nurse notified         Patient Diagnosis(es): The encounter diagnosis was Malignant neoplasm of esophagus, unspecified location [facility]). has a past medical history of CAD (coronary artery disease), History of tobacco use, Hyperlipidemia, Hypertension, Plantar fasciitis, and Prostate cancer ([facility] [address] has a past surgical history that includes Prostatectomy (2013); Coronary angioplasty with stent; Percutaneous Transluminal Coronary Angio; Cardiac surgery ();  Penile prosthesis placement (); and [address] [place] [address]  Times per week: 4-6x's a week while in ICU  Current Treatment Recommendations: Mira [place] Mobility Training,Safety Education & Training,Positioning,Self-Care / ADL,Endurance Training,Patient/Caregiver Education & Training    AM-PAC Score        AM-PAC Inpatient Daily Activity Raw Score: 11 (03/27/22 1107)  AM-PAC Inpatient ADL T-Scale Score : 29.04 (03/27/22 1107)  ADL Inpatient CMS 0-100% Score: 70.42 (03/27/22 1107)  ADL Inpatient CMS G-Code Modifier : CL (03/27/22 1107)    Goals  Short term goals  Time Frame for Short term goals: 2 weeks 4/09  Short term goal 1: Pt will complete toilet transfers with SBA by 4/01; 3/27 min assist of 1 sit<-->stand transfers from chair  Short term goal 2: Pt will complete LE dressing with CGA  Short term goal 3: Pt will complete standing level ADLs with SBA for balance  Patient Goals   Patient goals : \"to return home\"       Therapy Time   Individual Concurrent Group Co-treatment   Time In 1012         Time Out 1035         Minutes [address] [place] [address]

## 2022-03-28 ENCOUNTER — APPOINTMENT (OUTPATIENT)
Dept: GENERAL RADIOLOGY | Age: 72
DRG: 327 | End: 2022-03-28
Attending: THORACIC SURGERY (CARDIOTHORACIC VASCULAR SURGERY)
Payer: COMMERCIAL

## 2022-03-28 LAB
ANION GAP SERPL CALCULATED.3IONS-SCNC: 13 MMOL/L (ref 3–16)
BUN BLDV-MCNC: 26 MG/DL (ref 7–20)
CALCIUM SERPL-MCNC: 8.7 MG/DL (ref 8.3–10.6)
CHLORIDE BLD-SCNC: 96 MMOL/L (ref 99–110)
CO2: 23 MMOL/L (ref 21–32)
CREAT SERPL-MCNC: 0.8 MG/DL (ref 0.8–1.3)
GFR AFRICAN AMERICAN: >60
GFR NON-AFRICAN AMERICAN: >60
GLUCOSE BLD-MCNC: 118 MG/DL (ref 70–99)
HCT VFR BLD CALC: 37 % (ref 40.5–52.5)
HEMATOLOGY PATH CONSULT: NORMAL
HEMOGLOBIN: 12.5 G/DL (ref 13.5–17.5)
MAGNESIUM: 2.4 MG/DL (ref 1.8–2.4)
MCH RBC QN AUTO: 30.2 PG (ref 26–34)
MCHC RBC AUTO-ENTMCNC: 33.7 G/DL (ref 31–36)
MCV RBC AUTO: 89.8 FL (ref 80–100)
PDW BLD-RTO: 13.2 % (ref 12.4–15.4)
PLATELET # BLD: 201 K/UL (ref 135–450)
PMV BLD AUTO: 5.8 FL (ref 5–10.5)
POTASSIUM REFLEX MAGNESIUM: 4.9 MMOL/L (ref 3.5–5.1)
RBC # BLD: 4.12 M/UL (ref 4.2–5.9)
SODIUM BLD-SCNC: 132 MMOL/L (ref 136–145)
WBC # BLD: 10.3 K/UL (ref 4–11)

## 2022-03-28 PROCEDURE — 6360000002 HC RX W HCPCS: Performed by: THORACIC SURGERY (CARDIOTHORACIC VASCULAR SURGERY)

## 2022-03-28 PROCEDURE — 2700000000 HC OXYGEN THERAPY PER DAY

## 2022-03-28 PROCEDURE — 94640 AIRWAY INHALATION TREATMENT: CPT

## 2022-03-28 PROCEDURE — A4216 STERILE WATER/SALINE, 10 ML: HCPCS | Performed by: THORACIC SURGERY (CARDIOTHORACIC VASCULAR SURGERY)

## 2022-03-28 PROCEDURE — 36415 COLL VENOUS BLD VENIPUNCTURE: CPT

## 2022-03-28 PROCEDURE — 2000000000 HC ICU R&B

## 2022-03-28 PROCEDURE — 74220 X-RAY XM ESOPHAGUS 1CNTRST: CPT

## 2022-03-28 PROCEDURE — 6360000004 HC RX CONTRAST MEDICATION: Performed by: THORACIC SURGERY (CARDIOTHORACIC VASCULAR SURGERY)

## 2022-03-28 PROCEDURE — 71045 X-RAY EXAM CHEST 1 VIEW: CPT

## 2022-03-28 PROCEDURE — 2580000003 HC RX 258: Performed by: THORACIC SURGERY (CARDIOTHORACIC VASCULAR SURGERY)

## 2022-03-28 PROCEDURE — 6370000000 HC RX 637 (ALT 250 FOR IP): Performed by: THORACIC SURGERY (CARDIOTHORACIC VASCULAR SURGERY)

## 2022-03-28 PROCEDURE — 99024 POSTOP FOLLOW-UP VISIT: CPT | Performed by: NURSE PRACTITIONER

## 2022-03-28 PROCEDURE — 85027 COMPLETE CBC AUTOMATED: CPT

## 2022-03-28 PROCEDURE — 83735 ASSAY OF MAGNESIUM: CPT

## 2022-03-28 PROCEDURE — 2500000003 HC RX 250 WO HCPCS: Performed by: NURSE PRACTITIONER

## 2022-03-28 PROCEDURE — 2500000003 HC RX 250 WO HCPCS: Performed by: THORACIC SURGERY (CARDIOTHORACIC VASCULAR SURGERY)

## 2022-03-28 PROCEDURE — 6370000000 HC RX 637 (ALT 250 FOR IP): Performed by: NURSE PRACTITIONER

## 2022-03-28 PROCEDURE — 97110 THERAPEUTIC EXERCISES: CPT

## 2022-03-28 PROCEDURE — 80048 BASIC METABOLIC PNL TOTAL CA: CPT

## 2022-03-28 PROCEDURE — 97530 THERAPEUTIC ACTIVITIES: CPT

## 2022-03-28 PROCEDURE — 94669 MECHANICAL CHEST WALL OSCILL: CPT

## 2022-03-28 PROCEDURE — 94761 N-INVAS EAR/PLS OXIMETRY MLT: CPT

## 2022-03-28 RX ORDER — CARBOXYMETHYLCELLULOSE SODIUM 10 MG/ML
1 GEL OPHTHALMIC 3 TIMES DAILY PRN
Status: DISCONTINUED | OUTPATIENT
Start: 2022-03-28 | End: 2022-03-31 | Stop reason: HOSPADM

## 2022-03-28 RX ADMIN — METOPROLOL TARTRATE 5 MG: 1 INJECTION, SOLUTION INTRAVENOUS at 07:03

## 2022-03-28 RX ADMIN — METOPROLOL TARTRATE 5 MG: 1 INJECTION, SOLUTION INTRAVENOUS at 20:05

## 2022-03-28 RX ADMIN — ALBUTEROL SULFATE 2.5 MG: 2.5 SOLUTION RESPIRATORY (INHALATION) at 15:07

## 2022-03-28 RX ADMIN — FAMOTIDINE 20 MG: 10 INJECTION, SOLUTION INTRAVENOUS at 09:06

## 2022-03-28 RX ADMIN — POTASSIUM CHLORIDE, DEXTROSE MONOHYDRATE AND SODIUM CHLORIDE: 150; 5; 450 INJECTION, SOLUTION INTRAVENOUS at 01:45

## 2022-03-28 RX ADMIN — METOPROLOL TARTRATE 5 MG: 1 INJECTION, SOLUTION INTRAVENOUS at 00:38

## 2022-03-28 RX ADMIN — FUROSEMIDE 20 MG: 10 INJECTION, SOLUTION INTRAMUSCULAR; INTRAVENOUS at 17:24

## 2022-03-28 RX ADMIN — SODIUM CHLORIDE, PRESERVATIVE FREE 10 ML: 5 INJECTION INTRAVENOUS at 09:07

## 2022-03-28 RX ADMIN — MUPIROCIN: 20 OINTMENT TOPICAL at 20:20

## 2022-03-28 RX ADMIN — SODIUM CHLORIDE, PRESERVATIVE FREE 10 ML: 5 INJECTION INTRAVENOUS at 20:21

## 2022-03-28 RX ADMIN — AMIODARONE HYDROCHLORIDE 1 MG/MIN: 50 INJECTION, SOLUTION INTRAVENOUS at 07:30

## 2022-03-28 RX ADMIN — FUROSEMIDE 20 MG: 10 INJECTION, SOLUTION INTRAMUSCULAR; INTRAVENOUS at 20:05

## 2022-03-28 RX ADMIN — METOPROLOL TARTRATE 5 MG: 1 INJECTION, SOLUTION INTRAVENOUS at 22:23

## 2022-03-28 RX ADMIN — CARBOXYMETHYLCELLULOSE SODIUM 1 DROP: 10 GEL OPHTHALMIC at 15:08

## 2022-03-28 RX ADMIN — FUROSEMIDE 20 MG: 10 INJECTION, SOLUTION INTRAMUSCULAR; INTRAVENOUS at 12:39

## 2022-03-28 RX ADMIN — AMIODARONE HYDROCHLORIDE 1 MG/MIN: 50 INJECTION, SOLUTION INTRAVENOUS at 17:13

## 2022-03-28 RX ADMIN — METOPROLOL TARTRATE 5 MG: 1 INJECTION, SOLUTION INTRAVENOUS at 15:07

## 2022-03-28 RX ADMIN — FUROSEMIDE 20 MG: 10 INJECTION, SOLUTION INTRAMUSCULAR; INTRAVENOUS at 09:06

## 2022-03-28 RX ADMIN — FAMOTIDINE 20 MG: 10 INJECTION, SOLUTION INTRAVENOUS at 20:05

## 2022-03-28 RX ADMIN — METOPROLOL TARTRATE 5 MG: 1 INJECTION, SOLUTION INTRAVENOUS at 03:59

## 2022-03-28 RX ADMIN — ALBUTEROL SULFATE 2.5 MG: 2.5 SOLUTION RESPIRATORY (INHALATION) at 12:04

## 2022-03-28 RX ADMIN — DIATRIZOATE MEGLUMINE AND DIATRIZOATE SODIUM 120 ML: 660; 100 LIQUID ORAL; RECTAL at 11:46

## 2022-03-28 RX ADMIN — METOPROLOL TARTRATE 5 MG: 1 INJECTION, SOLUTION INTRAVENOUS at 10:32

## 2022-03-28 RX ADMIN — MUPIROCIN: 20 OINTMENT TOPICAL at 09:05

## 2022-03-28 RX ADMIN — POTASSIUM CHLORIDE, DEXTROSE MONOHYDRATE AND SODIUM CHLORIDE: 150; 5; 450 INJECTION, SOLUTION INTRAVENOUS at 20:22

## 2022-03-28 RX ADMIN — BACITRACIN: 500 OINTMENT TOPICAL at 09:06

## 2022-03-28 RX ADMIN — ALBUTEROL SULFATE 2.5 MG: 2.5 SOLUTION RESPIRATORY (INHALATION) at 08:28

## 2022-03-28 RX ADMIN — MORPHINE SULFATE 30 MG: 1 INJECTION INTRAVENOUS at 21:00

## 2022-03-28 RX ADMIN — ALBUTEROL SULFATE 2.5 MG: 2.5 SOLUTION RESPIRATORY (INHALATION) at 19:43

## 2022-03-28 RX ADMIN — METOPROLOL TARTRATE 5 MG: 1 INJECTION, SOLUTION INTRAVENOUS at 12:39

## 2022-03-28 ASSESSMENT — PAIN DESCRIPTION - ORIENTATION
ORIENTATION: RIGHT
ORIENTATION: RIGHT
ORIENTATION: RIGHT;LOWER
ORIENTATION: LOWER

## 2022-03-28 ASSESSMENT — PAIN DESCRIPTION - PROGRESSION
CLINICAL_PROGRESSION: GRADUALLY IMPROVING
CLINICAL_PROGRESSION: NOT CHANGED
CLINICAL_PROGRESSION: GRADUALLY IMPROVING

## 2022-03-28 ASSESSMENT — PAIN DESCRIPTION - LOCATION
LOCATION: BACK
LOCATION: BACK
LOCATION: ABDOMEN
LOCATION: BACK

## 2022-03-28 ASSESSMENT — PAIN DESCRIPTION - PAIN TYPE
TYPE: CHRONIC PAIN
TYPE: ACUTE PAIN;CHRONIC PAIN
TYPE: ACUTE PAIN;SURGICAL PAIN
TYPE: CHRONIC PAIN

## 2022-03-28 ASSESSMENT — PAIN DESCRIPTION - DESCRIPTORS
DESCRIPTORS: DISCOMFORT;CONSTANT
DESCRIPTORS: ACHING;DISCOMFORT

## 2022-03-28 ASSESSMENT — PAIN DESCRIPTION - ONSET
ONSET: ON-GOING
ONSET: ON-GOING

## 2022-03-28 ASSESSMENT — PAIN SCALES - GENERAL
PAINLEVEL_OUTOF10: 5
PAINLEVEL_OUTOF10: 5
PAINLEVEL_OUTOF10: 8
PAINLEVEL_OUTOF10: 5

## 2022-03-28 ASSESSMENT — PAIN - FUNCTIONAL ASSESSMENT: PAIN_FUNCTIONAL_ASSESSMENT: PREVENTS OR INTERFERES SOME ACTIVE ACTIVITIES AND ADLS

## 2022-03-28 ASSESSMENT — PAIN DESCRIPTION - FREQUENCY: FREQUENCY: CONTINUOUS

## 2022-03-28 NOTE — PROGRESS NOTES
Cardiac, Vascular [facility] Thoracic Surgeons  Progress Note    3/28/2022 9:16 AM  Surgeon: Nguyễn Doran    POD#3   S/P : Camden Gunning esophagectomy    Post op follow-up    Subjective:   No major complaint or event. Up in chair this morning in NAD. Hospital course:  3/26 sinus tachycardia blood pressure was controlled weaned off  3/27 no major complaint or event overnight pain is well controlled diuresing well  3/28 AF/RVR overnight, amio gtt converted him back to SR 80's    Vital Signs: BP (!) 146/77   Pulse 95   Temp 97.9 °F (36.6 °C) (Oral)   Resp 20   Ht 6' (1.829 m)   Wt 232 lb 14.4 oz (105.6 kg)   SpO2 95%   BMI 31.59 kg/m²  O2 Flow Rate (L/min): 2 L/min     I/O:      Intake/Output Summary (Last 24 hours) at 3/28/2022 0916  Last data filed at 3/28/2022 0912  Gross per 24 hour   Intake 1878.57 ml   Output 2600 ml   Net -721.43 ml       Exam:   Cardiovascular: S1 plus S2 sinus rhythm     Pulmonary: Bilaterally clear no additional sound, chest x-ray shows atelectatic lower lobes    Abdomen: Soft, non-distended, non-tender, + bowel sounds     Urine output good 2395 ml in 24 hrs; Cr 0.8     CT: Chest tube #1     serosanguineous no airleak         chest tube #2       serosanguineous no airleak     airleak    Radiology  Atelectatic lower lobes    Labs:   CBC:   Recent Labs     03/26/22  0411 03/27/22  0405 03/28/22  0425   WBC 12.0* 13.4* 10.3   HGB 14.6 13.5 12.5*   HCT 42.7 40.1* 37.0*   MCV 89.3 89.8 89.8    232 201     BMP:   Recent Labs     03/25/22  1355 03/25/22  1355 03/26/22  0411 03/27/22  0405 03/28/22  0425   *   < > 130* 130* 132*   K 3.9   < > 4.1 4.7 4.9   CL 99   < > 98* 98* 96*   CO2 22   < > 20* 21 23   PHOS 3.5  --  3.1  --   --    BUN 19   < > 17 27* 26*   CREATININE 0.9   < > 0.7* 0.9 0.8    < > = values in this interval not displayed.      Scheduled Meds:    mupirocin   Nasal BID    famotidine (PEPCID) injection  20 mg IntraVENous BID    metoprolol  5 mg IntraVENous Q3H (SCHEDULED)    furosemide  20 mg IntraVENous 4x Daily    sodium chloride flush  5-40 mL IntraVENous 2 times per day    bacitracin   Topical Daily    albuterol  2.5 mg Nebulization 4x daily     Continuous Infusions:    amiodarone 1 mg/min (03/28/22 0730)    morphine      dextrose 5% and 0.45% NaCl with KCl 20 mEq 50 mL/hr at 03/28/22 0145    sodium chloride         Patient Active Problem List   Diagnosis    NSTEMI (non-ST elevation myocardial infarction) ([facility] [address]    HTN (hypertension)    CAD (coronary artery disease)    HLD (hyperlipidemia)    Esophageal cancer (HCC)         Assessment/Plan:  Status post Triston Kalie Milligany esophagectomy  Will get an esophagram this morning. As long as there is no evidence of extravasation, will plan to:  -connect NGT to suction after imaging results, followed by removal.    -remove chest tube A#2    -small sips of clears   Hydralazine as needed for blood pressure above 140  Continue with diuresis    Atrial fibrillation:   Went into AF overnight 150-160's  Amiodarone drip was started. Continue at 1 mg/min. He converted back to SR. Will plan to start PO in addition to drip after esophagram if there is no extravasation. Continue IV metoprolol 5 mg Q3 hours (with hold parameters).   ______________________________________________________    MICHAEL Kern   3/28/22   09:17 AM

## 2022-03-28 NOTE — PROGRESS NOTES
Occupational Therapy  Facility/Department: [facility] C2 CARD TELEMETRY  Daily Treatment Note  NAME: Sadaf Gandara  : 1950  MRN: [id number]    Date of Service: 3/28/2022    Discharge Recommendations:  24 hour supervision or assist       Assessment   Performance deficits / Impairments: Decreased functional mobility ; Decreased endurance;Decreased ADL status; Decreased balance;Decreased strength;Decreased posture  Assessment: chest tubes x 2, ICU monitering, PCA pump, 5L O2; Pt requiring min assist/CGA for sit<>stand and is dependent for UE ADLs d/t multiple lines. Plan to cont OT POC. OT Education: OT Role;Plan of Care;Precautions  Patient Education: Disease specific: importance of use of RED/nurse call light for ADL needs/assist with transfers/repositioning  REQUIRES OT FOLLOW UP: Yes  Activity Tolerance  Activity Tolerance: Patient Tolerated treatment well;Patient limited by fatigue  Activity Tolerance: Pt vitals stayed [facility] on 5L O2, HR  with exercise/sit<-->stand  Safety Devices  Safety Devices in place: Yes  Type of devices: Call light within reach; Left in chair;Nurse notified         Patient Diagnosis(es): The encounter diagnosis was Malignant neoplasm of esophagus, unspecified location [facility]). has a past medical history of CAD (coronary artery disease), History of tobacco use, Hyperlipidemia, Hypertension, Plantar fasciitis, and Prostate cancer ([facility] [address] has a past surgical history that includes Prostatectomy (2013); Coronary angioplasty with stent; Percutaneous Transluminal Coronary Angio; Cardiac surgery (); Penile prosthesis placement (); and Esophagectomy (N/A, 3/25/2022). Restrictions  Restrictions/Precautions  Restrictions/Precautions: General Precautions,Fall Risk,Up as Tolerated  Position Activity Restriction  Other position/activity restrictions: 2 chest tubes, NG tube (can now be clamped intermittently per RN Lele), O2 5L PCA pump.   Subjective   General  Chart Reviewed: Yes,Progress Notes,Labs,Previous Admission  Patient assessed for rehabilitation services?: Yes  Response to previous treatment: Patient with no complaints from previous session  Family / Caregiver Present: No  Referring Practitioner: Basim Prakash MD 3/25/22  Diagnosis: ESOPHAGEAL CANCER; s/p Triston Jade Xander esophagectomy 3/25/22  Subjective  Subjective: Pt up in chair upon OT arrival with eyes closed. Pt responsive and agreeable to therapy session.   General Comment  Comments: RN cleared pt for OT but to monitor HR  Pain Assessment  Pain Assessment: 0-10  Pain Level: 8  Pain Type: Chronic pain  Pain Location: Back  Pain Orientation: Right  Non-Pharmaceutical Pain Intervention(s): Cold applied  Response to Pain Intervention: Patient Satisfied  Pre Treatment Pain Screening  Intervention List: Patient able to continue with treatment  Vital Signs  Patient Currently in Pain: Yes   Orientation  Orientation  Overall Orientation Status: Within Functional Limits  Objective    ADL  UE Dressing: Maximum assistance (Pt limited by fatigue and assist required for line management)  Toileting: Dependent/Total (peñaloza)        Balance  Sitting Balance: Supervision  Standing Balance: Contact guard assistance  Standing Balance  Time: 1- 2 minutes  Activity: sit<>stand & forward/backward stepping  Comment: HR increased to 110; limited mobility d/t NG tube to suction  Bed mobility  Supine to Sit: Unable to assess  Sit to Supine: Unable to assess  Comment: Pt in chair at beginning and end of session  Transfers  Sit to stand: Contact guard assistance  Stand to sit: Contact guard assistance           Type of ROM/Therapeutic Exercise  Type of ROM/Therapeutic Exercise: AROM  Comment: BUE seated in chair  Exercises  Shoulder Elevation: x 10 reps  Elbow Flexion: RUE only x 10 reps  Elbow Extension: RUE only x 10 reps 2  Supination: B x 10 reps  Pronation: B x 10 reps  Wrist Flexion: B x 10 reps  Wrist Extension: B x 10 reps  Finger Flexion: B x 10 reps  Finger Extension: B x 10 reps      Plan   Plan  Times per week: 4-6x's a week while in ICU  Current Treatment Recommendations: Evie Pereira Mobility Training,Safety Education & Training,Positioning,Self-Care / ADL,Endurance Training,Patient/Caregiver Education & Training    AM-PAC Score  AM-PAC Inpatient Daily Activity Raw Score: 12 (03/28/22 1223)  AM-PAC Inpatient ADL T-Scale Score : 30.6 (03/28/22 1223)  ADL Inpatient CMS 0-100% Score: 66.57 (03/28/22 1223)  ADL Inpatient CMS G-Code Modifier : CL (03/28/22 1223)    Goals  Short term goals  Time Frame for Short term goals: 2 weeks 4/09  Short term goal 1: Pt will complete toilet transfers with SBA by 4/01; ongoing 3/28  Short term goal 2: Pt will complete LE dressing with CGA; ongoing 3/28  Short term goal 3: Pt will complete standing level ADLs with SBA for balance; ongoing 3/28  Patient Goals   Patient goals : \"to return home\"       Therapy Time   Individual Concurrent Group Co-treatment   Time In 0613         Time Out 1024         Minutes 25              If pt is unable to be seen after this session, please let this note serve as discharge summary. Please see case management note for discharge disposition. Thank you.       Sonny Sainz OT

## 2022-03-28 NOTE — PROGRESS NOTES
Paged CTS on call. Pt's HR is in the 150s-160s. Awaiting call back. 2345: Pt converted back to NSR, heart rate in the 80s. Will continue to monitor. 1: Spoke with Dr. Lulu Gaytan - continue the amio gtt overnight and let Dr. Perez Aynor decide during morning rounds. Plan is for patient to have a swallow eval and start PO amio. Pt has no complaints and vitals remain stable at this time.

## 2022-03-28 NOTE — PROGRESS NOTES
Shift: 0700/1900    Nursing assessment at handoff  stable    Rhythm changes Normal Sinus Rhythm/Sinus Tachycardia     Rhythm Intervention continue Amio gtt    Drop in Urinary Output no    Changes to drips? no    POD 1 ONLY: Pacing Wires Removed: []Yes           [] NO        [] Platelets < 52,736        [] Arrhythmia        [] Bradycardia        [] Valve Replacement         [] Pacing for Cardiac Index    LDA Insertion Date Date Changed (if needed) Discontinued Date   Art line 3/25/22  3/27/22   Central Line 3/25/22     ET Tube      Moseley 3/25/22     Chest Tube 3/25/22  #2 3/28/22   Leg Drain      Pacing Wires        Surgery, return to OR no     Hospital Course:  POD# 1 Night: Pt remained stable overnight. Pt was diaphoretic and had some SOB when getting up in the chair. Increased oxygen from 4L to 7L. 1230ml of u/o. Nicardipine gtt is off. POD# 1 Day (3/26)  Pt. Briefly restarted on nicardipine gtt @ 2.5mg/hr until lopressor 2.5mg Q3h was started. Pt. BP and HR well maintained within parameters on lopressor. Precedex gtt started around 0900 at .4 mcg/kg and titrated off by 1400 due to pt becoming overly lethargic. Upon initial assessment pt was breathing short and shallow  breathes due to severe pain. PCA pump with morphine was started 1mg/ml. Initial lockout was 1mg/10 mins up to 6mg/hr. Due to increased lethargy lockout changed to 1mg/15 mins up to 4mg/hr. Pt had decreased U/O over shift and was given 20mg of lasix. Lasix had little affect on U/O so pt was given 5% albumin 250ml. Pt is A&O x4 showing SR/ST on the monitor w/ well maintained SPO2 on 6L nasal canula. Pt still experiences dyspnea, HTN, and tachycardia w/ exertion. Chest tube sites dry, clean and intact. U/O increased w/ second dose of lasix. POD #2 PM: Pt's vitals were stable overnight. Tolerated getting up in the chair better than previous night.  Pain is better controlled with PCA pump and oxygen requirement remains the same at 5L NC.    POD #2 (3/27  7A - 7P)    Pt. A&O X4 showing SR/ST on the monitor w/ well maintained SPO2 on 5L nasal canula. L. Radial Art line DC and peñaloza DC. Lopressor dose increased from 2.5mg to 5mg IV and PRN hydralazine 5mg was added for HTN. Pt. Continues to have mild tachycardia even w/ increase in lopressor dose. Chest tubes switched to water seal w/ continued seroussanguinous output. Chest tube dressing dry,clean and intact. Air leak no longer noted in atrium #1. NG tube continued on low - intermittent suction w/ little to no output. All surgical sites well proximated and unremarkable. Pt. did not void 6 hrs after peñaloza was removed and was bladder scanned for a total of 538ml. Peñaloza catheter placed around 1500 per Dr. Hunter Holguin order. During placement of catheter pt went into Afib- RVR HR in the 180's. EKG obtained confirming rhythm, Dr. Carly Malik notified by [facility] and [facility] arrhythmic treatment order set was initiated per protocol. Pt received 150mg Amio bolus and started continues 450mg amio gtt running at 33.3 ml/hr. Pt. HR has decreased into the 130's-140's for about 45 mins and then increased back into the 170's-180's. Dr. Toney Goodell rounded around [phone] and was updated on the pt. SBP dropped into the 90's and Albumin 5% 250ml X2 was ordered. An additional 75mg bolus of amio was ordered and 5mg IVP of lopressor was given once SBP was >120. Pt. BP stable at end of shift w/ pt maintained in Afib-RVR HR in the 140's. POD #3 PM: Pt was in afib RVR heart rate in the 140-150s. Another 75mg amio bolus was given with 0.125 mg of digoxin. 4g of magnesium was also replaced. Pt converted to NSR around 2345, heart rate in the 80s and remained in this rhythm over night.     Changes in O2 requirements Oxygen Therapy  SpO2: 96 %  Pulse Oximeter Device Mode: Continuous  Pulse Oximeter Device Location: Finger  O2 Device: Nasal cannula  O2 Flow Rate (L/min): 2 L/min  End Tidal CO2: 32 (%)     Most recent vitals /84 Pulse 94   Temp 98.5 °F (36.9 °C) (Oral)   Resp 18   Ht 6' (1.829 m)   Wt 232 lb 14.4 oz (105.6 kg)   SpO2 96%   BMI 31.59 kg/m²       Admission weight Weight: 230 lb (104.3 kg)     Today's weight   Wt Readings from Last 1 Encounters:   03/28/22 232 lb 14.4 oz (105.6 kg)         Lab Data:  CBC:   Recent Labs     03/28/22  0425   WBC 10.3   HGB 12.5*   HCT 37.0*   MCV 89.8        BMP:    Recent Labs     03/28/22  0425   *   K 4.9   CO2 23   BUN 26*   CREATININE 0.8     LIVR: No results for input(s): AST, ALT in the last 72 hours. PT/INR: No results for input(s): PROT, INR in the last 72 hours. APTT: No results for input(s): APTT in the last 72 hours.     Drip rates at handoff:    amiodarone 1 mg/min (03/28/22 1713)    morphine      dextrose 5% and 0.45% NaCl with KCl 20 mEq 50 mL/hr at 03/28/22 1202    sodium chloride           Electronically signed by Miri Ashraf RN on 3/26/22 at 6:58 AM EDT

## 2022-03-28 NOTE — PROGRESS NOTES
Ambulated in hallway with walker and 2 L NC. HR remained in sinus at rate of 104 bpm. SpO2 stayed 88-92%.

## 2022-03-28 NOTE — PROGRESS NOTES
Radiologist pulled to different case. [facility] tech will call writer for transport for patient's swallow eval as soon as he is back at [facility].

## 2022-03-28 NOTE — PROGRESS NOTES
Shift: 0700/1900    Nursing assessment at handoff  stable    Rhythm changes Normal Sinus Rhythm/Sinus Tachycardia     Rhythm Intervention     Drop in Urinary Output no    Changes to drips? no    POD 1 ONLY: Pacing Wires Removed: []Yes           [] NO        [] Platelets < 61,371        [] Arrhythmia        [] Bradycardia        [] Valve Replacement         [] Pacing for Cardiac Index    LDA Insertion Date Date Changed (if needed) Discontinued Date   Art line 3/25/22  3/27/22   Central Line 3/25/22     ET Tube      Moseley 3/25/22     Chest Tube 3/25/22     Leg Drain      Pacing Wires        Surgery, return to OR no     Hospital Course:  POD# 1 Night: Pt remained stable overnight. Pt was diaphoretic and had some SOB when getting up in the chair. Increased oxygen from 4L to 7L. 1230ml of u/o. Nicardipine gtt is off. POD# 1 Day (3/26)  Pt. Briefly restarted on nicardipine gtt @ 2.5mg/hr until lopressor 2.5mg Q3h was started. Pt. BP and HR well maintained within parameters on lopressor. Precedex gtt started around 0900 at .4 mcg/kg and titrated off by 1400 due to pt becoming overly lethargic. Upon initial assessment pt was breathing short and shallow  breathes due to severe pain. PCA pump with morphine was started 1mg/ml. Initial lockout was 1mg/10 mins up to 6mg/hr. Due to increased lethargy lockout changed to 1mg/15 mins up to 4mg/hr. Pt had decreased U/O over shift and was given 20mg of lasix. Lasix had little affect on U/O so pt was given 5% albumin 250ml. Pt is A&O x4 showing SR/ST on the monitor w/ well maintained SPO2 on 6L nasal canula. Pt still experiences dyspnea, HTN, and tachycardia w/ exertion. Chest tube sites dry, clean and intact. U/O increased w/ second dose of lasix. POD #2 PM: Pt's vitals were stable overnight. Tolerated getting up in the chair better than previous night.  Pain is better controlled with PCA pump and oxygen requirement remains the same at 5L NC.    POD #2 (3/27  7A - 7P)    Pt. A&O X4 showing SR/ST on the monitor w/ well maintained SPO2 on 5L nasal canula. L. Radial Art line DC and peñaloza DC. Lopressor dose increased from 2.5mg to 5mg IV and PRN hydralazine 5mg was added for HTN. Pt. Continues to have mild tachycardia even w/ increase in lopressor dose. Chest tubes switched to water seal w/ continued seroussanguinous output. Chest tube dressing dry,clean and intact. Air leak no longer noted in atrium #1. NG tube continued on low - intermittent suction w/ little to no output. All surgical sites well proximated and unremarkable. Pt. did not void 6 hrs after peñaloza was removed and was bladder scanned for a total of 538ml. Peñaloza catheter placed around 1500 per Dr. Percy Gomez order. During placement of catheter pt went into Afib- RVR HR in the 180's. EKG obtained confirming rhythm, Dr. Raza notified by [facility] and CTS arrhythmic treatment order set was initiated per protocol. Pt received 150mg Amio bolus and started continues 450mg amio gtt running at 33.3 ml/hr. Pt. HR has decreased into the 130's-140's for about 45 mins and then increased back into the 170's-180's. Dr. Annabelle Astudillo rounded around [phone] and was updated on the pt. SBP dropped into the 90's and Albumin 5% 250ml X2 was ordered. An additional 75mg bolus of amio was ordered and 5mg IVP of lopressor was given once SBP was >120. Pt. BP stable at end of shift w/ pt maintained in Afib-RVR HR in the 140's. POD #3 PM: Pt was in afib RVR heart rate in the 140-150s. Another 75mg amio bolus was given with 0.125 mg of digoxin. 4g of magnesium was also replaced. Pt converted to NSR around 2345, heart rate in the 80s and remained in this rhythm over night.     Changes in O2 requirements Oxygen Therapy  SpO2: 97 %  Pulse Oximeter Device Mode: Continuous  Pulse Oximeter Device Location: Finger  O2 Device: Nasal cannula  O2 Flow Rate (L/min): 4 L/min  End Tidal CO2: 33 (%)     Most recent vitals /75   Pulse 79   Temp 98.7 °F (37.1 °C) (Axillary)   Resp 12   Ht 6' (1.829 m)   Wt 232 lb 14.4 oz (105.6 kg)   SpO2 97%   BMI 31.59 kg/m²       Admission weight Weight: 230 lb (104.3 kg)     Today's weight   Wt Readings from Last 1 Encounters:   03/28/22 232 lb 14.4 oz (105.6 kg)         Lab Data:  CBC:   Recent Labs     03/28/22  0425   WBC 10.3   HGB 12.5*   HCT 37.0*   MCV 89.8        BMP:    Recent Labs     03/28/22  0425   *   K 4.9   CO2 23   BUN 26*   CREATININE 0.8     LIVR: No results for input(s): AST, ALT in the last 72 hours. PT/INR: No results for input(s): PROT, INR in the last 72 hours. APTT: No results for input(s): APTT in the last 72 hours.     Drip rates at handoff:    amiodarone 1 mg/min (03/27/22 2330)    niCARdipene (CARDENE) 50 mg in 250 mL 0.9 % sodium chloride infusion Stopped (03/26/22 0640)    morphine      dexmedetomidine (PRECEDEX) IV infusion Stopped (03/26/22 1402)    dextrose 5% and 0.45% NaCl with KCl 20 mEq 50 mL/hr at 03/28/22 0145    sodium chloride           Electronically signed by Alicja Olvera RN on 3/26/22 at 6:58 AM EDT

## 2022-03-28 NOTE — PROGRESS NOTES
Writer in room for report at 0730, pt reported surgeon not seen yet today. Lines/drains assessed. CT surgeon team rounded after writer left room to get report on next patient, thus, rounding done via telephone with writer and Juan Valencia CNP. Wife updated via telephone and will be here today. She is concerned about his HR and asked if he could see his cardiologist while admitted. Writer discussed this with Srinivas Mendenhall CNP. Will monitor.

## 2022-03-29 ENCOUNTER — APPOINTMENT (OUTPATIENT)
Dept: GENERAL RADIOLOGY | Age: 72
DRG: 327 | End: 2022-03-29
Attending: THORACIC SURGERY (CARDIOTHORACIC VASCULAR SURGERY)
Payer: COMMERCIAL

## 2022-03-29 LAB
ANION GAP SERPL CALCULATED.3IONS-SCNC: 13 MMOL/L (ref 3–16)
BASOPHILS ABSOLUTE: 0 K/UL (ref 0–0.2)
BASOPHILS RELATIVE PERCENT: 0.2 %
BUN BLDV-MCNC: 27 MG/DL (ref 7–20)
CALCIUM SERPL-MCNC: 8.7 MG/DL (ref 8.3–10.6)
CHLORIDE BLD-SCNC: 96 MMOL/L (ref 99–110)
CO2: 24 MMOL/L (ref 21–32)
CREAT SERPL-MCNC: 0.9 MG/DL (ref 0.8–1.3)
EKG ATRIAL RATE: 119 BPM
EKG DIAGNOSIS: NORMAL
EKG Q-T INTERVAL: 274 MS
EKG QRS DURATION: 88 MS
EKG QTC CALCULATION (BAZETT): 468 MS
EKG R AXIS: -23 DEGREES
EKG T AXIS: 110 DEGREES
EKG VENTRICULAR RATE: 176 BPM
EOSINOPHILS ABSOLUTE: 0.1 K/UL (ref 0–0.6)
EOSINOPHILS RELATIVE PERCENT: 1.4 %
GFR AFRICAN AMERICAN: >60
GFR NON-AFRICAN AMERICAN: >60
GLUCOSE BLD-MCNC: 120 MG/DL (ref 70–99)
HCT VFR BLD CALC: 35.7 % (ref 40.5–52.5)
HEMOGLOBIN: 12.1 G/DL (ref 13.5–17.5)
LYMPHOCYTES ABSOLUTE: 0.7 K/UL (ref 1–5.1)
LYMPHOCYTES RELATIVE PERCENT: 8.4 %
MCH RBC QN AUTO: 30.1 PG (ref 26–34)
MCHC RBC AUTO-ENTMCNC: 33.8 G/DL (ref 31–36)
MCV RBC AUTO: 89 FL (ref 80–100)
MONOCYTES ABSOLUTE: 1 K/UL (ref 0–1.3)
MONOCYTES RELATIVE PERCENT: 12.5 %
NEUTROPHILS ABSOLUTE: 6.3 K/UL (ref 1.7–7.7)
NEUTROPHILS RELATIVE PERCENT: 77.5 %
PDW BLD-RTO: 12.8 % (ref 12.4–15.4)
PLATELET # BLD: 210 K/UL (ref 135–450)
PMV BLD AUTO: 5.9 FL (ref 5–10.5)
POTASSIUM REFLEX MAGNESIUM: 4.1 MMOL/L (ref 3.5–5.1)
RBC # BLD: 4 M/UL (ref 4.2–5.9)
SODIUM BLD-SCNC: 133 MMOL/L (ref 136–145)
WBC # BLD: 8.2 K/UL (ref 4–11)

## 2022-03-29 PROCEDURE — 97530 THERAPEUTIC ACTIVITIES: CPT

## 2022-03-29 PROCEDURE — 97110 THERAPEUTIC EXERCISES: CPT

## 2022-03-29 PROCEDURE — 97535 SELF CARE MNGMENT TRAINING: CPT

## 2022-03-29 PROCEDURE — 36415 COLL VENOUS BLD VENIPUNCTURE: CPT

## 2022-03-29 PROCEDURE — 71045 X-RAY EXAM CHEST 1 VIEW: CPT

## 2022-03-29 PROCEDURE — A4216 STERILE WATER/SALINE, 10 ML: HCPCS | Performed by: THORACIC SURGERY (CARDIOTHORACIC VASCULAR SURGERY)

## 2022-03-29 PROCEDURE — 6360000002 HC RX W HCPCS: Performed by: THORACIC SURGERY (CARDIOTHORACIC VASCULAR SURGERY)

## 2022-03-29 PROCEDURE — 2700000000 HC OXYGEN THERAPY PER DAY

## 2022-03-29 PROCEDURE — 80048 BASIC METABOLIC PNL TOTAL CA: CPT

## 2022-03-29 PROCEDURE — 6360000002 HC RX W HCPCS: Performed by: NURSE PRACTITIONER

## 2022-03-29 PROCEDURE — 93010 ELECTROCARDIOGRAM REPORT: CPT | Performed by: INTERNAL MEDICINE

## 2022-03-29 PROCEDURE — 94761 N-INVAS EAR/PLS OXIMETRY MLT: CPT

## 2022-03-29 PROCEDURE — 85025 COMPLETE CBC W/AUTO DIFF WBC: CPT

## 2022-03-29 PROCEDURE — 2580000003 HC RX 258: Performed by: THORACIC SURGERY (CARDIOTHORACIC VASCULAR SURGERY)

## 2022-03-29 PROCEDURE — 99024 POSTOP FOLLOW-UP VISIT: CPT | Performed by: NURSE PRACTITIONER

## 2022-03-29 PROCEDURE — 2500000003 HC RX 250 WO HCPCS: Performed by: THORACIC SURGERY (CARDIOTHORACIC VASCULAR SURGERY)

## 2022-03-29 PROCEDURE — 6370000000 HC RX 637 (ALT 250 FOR IP): Performed by: NURSE PRACTITIONER

## 2022-03-29 PROCEDURE — 2000000000 HC ICU R&B

## 2022-03-29 PROCEDURE — 94640 AIRWAY INHALATION TREATMENT: CPT

## 2022-03-29 PROCEDURE — 94669 MECHANICAL CHEST WALL OSCILL: CPT

## 2022-03-29 PROCEDURE — 6370000000 HC RX 637 (ALT 250 FOR IP): Performed by: THORACIC SURGERY (CARDIOTHORACIC VASCULAR SURGERY)

## 2022-03-29 RX ORDER — AMIODARONE HYDROCHLORIDE 200 MG/1
400 TABLET ORAL 2 TIMES DAILY
Status: DISCONTINUED | OUTPATIENT
Start: 2022-03-29 | End: 2022-03-31 | Stop reason: HOSPADM

## 2022-03-29 RX ORDER — METOPROLOL TARTRATE 50 MG/1
50 TABLET, FILM COATED ORAL EVERY MORNING
Status: DISCONTINUED | OUTPATIENT
Start: 2022-03-29 | End: 2022-03-31 | Stop reason: HOSPADM

## 2022-03-29 RX ORDER — FUROSEMIDE 10 MG/ML
20 INJECTION INTRAMUSCULAR; INTRAVENOUS 2 TIMES DAILY
Status: DISCONTINUED | OUTPATIENT
Start: 2022-03-29 | End: 2022-03-30

## 2022-03-29 RX ORDER — OXYCODONE HCL 5 MG/5 ML
5 SOLUTION, ORAL ORAL
Status: DISCONTINUED | OUTPATIENT
Start: 2022-03-29 | End: 2022-03-31 | Stop reason: HOSPADM

## 2022-03-29 RX ORDER — AMIODARONE HYDROCHLORIDE 200 MG/1
400 TABLET ORAL DAILY
Status: DISCONTINUED | OUTPATIENT
Start: 2022-04-01 | End: 2022-03-31 | Stop reason: HOSPADM

## 2022-03-29 RX ADMIN — ALBUTEROL SULFATE 2.5 MG: 2.5 SOLUTION RESPIRATORY (INHALATION) at 15:32

## 2022-03-29 RX ADMIN — ENOXAPARIN SODIUM 40 MG: 40 INJECTION SUBCUTANEOUS at 11:36

## 2022-03-29 RX ADMIN — BACITRACIN: 500 OINTMENT TOPICAL at 08:15

## 2022-03-29 RX ADMIN — METOPROLOL TARTRATE 50 MG: 50 TABLET ORAL at 08:13

## 2022-03-29 RX ADMIN — ALBUTEROL SULFATE 2.5 MG: 2.5 SOLUTION RESPIRATORY (INHALATION) at 07:42

## 2022-03-29 RX ADMIN — ALBUTEROL SULFATE 2.5 MG: 2.5 SOLUTION RESPIRATORY (INHALATION) at 11:26

## 2022-03-29 RX ADMIN — AMIODARONE HYDROCHLORIDE 1 MG/MIN: 50 INJECTION, SOLUTION INTRAVENOUS at 00:06

## 2022-03-29 RX ADMIN — FAMOTIDINE 20 MG: 10 INJECTION, SOLUTION INTRAVENOUS at 20:38

## 2022-03-29 RX ADMIN — METOPROLOL TARTRATE 5 MG: 1 INJECTION, SOLUTION INTRAVENOUS at 01:08

## 2022-03-29 RX ADMIN — MUPIROCIN: 20 OINTMENT TOPICAL at 20:38

## 2022-03-29 RX ADMIN — FUROSEMIDE 20 MG: 10 INJECTION, SOLUTION INTRAMUSCULAR; INTRAVENOUS at 18:06

## 2022-03-29 RX ADMIN — SODIUM CHLORIDE, PRESERVATIVE FREE 10 ML: 5 INJECTION INTRAVENOUS at 08:16

## 2022-03-29 RX ADMIN — HYDRALAZINE HYDROCHLORIDE 5 MG: 20 INJECTION INTRAMUSCULAR; INTRAVENOUS at 15:50

## 2022-03-29 RX ADMIN — FUROSEMIDE 20 MG: 10 INJECTION, SOLUTION INTRAMUSCULAR; INTRAVENOUS at 08:14

## 2022-03-29 RX ADMIN — METOPROLOL TARTRATE 25 MG: 25 TABLET, FILM COATED ORAL at 20:37

## 2022-03-29 RX ADMIN — SODIUM CHLORIDE, PRESERVATIVE FREE 10 ML: 5 INJECTION INTRAVENOUS at 20:39

## 2022-03-29 RX ADMIN — AMIODARONE HYDROCHLORIDE 400 MG: 200 TABLET ORAL at 08:13

## 2022-03-29 RX ADMIN — FAMOTIDINE 20 MG: 10 INJECTION, SOLUTION INTRAVENOUS at 08:13

## 2022-03-29 RX ADMIN — DOCUSATE SODIUM 100 MG: 50 LIQUID ORAL at 12:21

## 2022-03-29 RX ADMIN — METOPROLOL TARTRATE 5 MG: 1 INJECTION, SOLUTION INTRAVENOUS at 04:19

## 2022-03-29 RX ADMIN — MUPIROCIN: 20 OINTMENT TOPICAL at 08:15

## 2022-03-29 RX ADMIN — AMIODARONE HYDROCHLORIDE 400 MG: 200 TABLET ORAL at 20:37

## 2022-03-29 RX ADMIN — ALBUTEROL SULFATE 2.5 MG: 2.5 SOLUTION RESPIRATORY (INHALATION) at 19:48

## 2022-03-29 ASSESSMENT — PAIN SCALES - GENERAL
PAINLEVEL_OUTOF10: 2
PAINLEVEL_OUTOF10: 0

## 2022-03-29 NOTE — CONSULTS
Comprehensive Nutrition Assessment    Type and Reason for Visit:  Initial,Consult    Nutrition Recommendations/Plan:   1. Continue clear liquid diet - advance as medically feasible   2. Add Ensure Clear with meals - monitor acceptance   3. Encourage PO as tolerated   4. If unable to further advance PO diet within 24-48 hrs, recommend TPN initiation. Consult dietitian for recommendations   5. Monitor nutrition adequacy, pertinent labs, bowel habits, wt changes, and clinical progress    Nutrition Assessment:  Pt admitted with esophageal cancer. S/p esophagectomy. NG for suction removed. Diet advanced to clear liquids today. Consulted for oral nutrition supplements. Pt reports tolerating clear liquids well this afternoon. Denies any recent weight loss or changes to PO intakes PTA. Encouraged PO intakes as tolerated. Agreeable to clear liquid ONS, RD to order. Will continue to monitor. Malnutrition Assessment:  Malnutrition Status: At risk for malnutrition (Comment)    Context:  Acute Illness     Findings of the 6 clinical characteristics of malnutrition:  Energy Intake:  7 - 50% or less of estimated energy requirements for 5 or more days    Estimated Daily Nutrient Needs:  Energy (kcal):  3786-7862 kcal; Weight Used for Energy Requirements:  Ideal (81 kg)     Protein (g):  81-98 g; Weight Used for Protein Requirements:  Ideal (1.0-1.2 g/kg)        Fluid (ml/day):   ; Method Used for Fluid Requirements:  1 ml/kcal      Nutrition Related Findings:  Last BM on 3/27. Bowel regimen ordered. Hypoactive BS. Na 132. K 4.9. Wounds:  Surgical Incision       Current Nutrition Therapies:    ADULT DIET; Clear Liquid    Anthropometric Measures:  · Height: 6' (182.9 cm)  · Current Body Weight: 233 lb (105.7 kg)   · Admission Body Weight: 235 lb (106.6 kg) (standing scale)    · Ideal Body Weight: 178 lbs; % Ideal Body Weight 130.9 %   · BMI: 31.6  · BMI Categories: Obese Class 1 (BMI 30.0-34. 9)       Nutrition Diagnosis: · Inadequate oral intake related to altered GI structure as evidenced by NPO or clear liquid status due to medical condition      Nutrition Interventions:   Food and/or Nutrient Delivery:  Continue Current Diet,Start Oral Nutrition Supplement  Nutrition Education/Counseling:  No recommendation at this time   Coordination of Nutrition Care:  Continue to monitor while inpatient    Goals:  Pt will tolerate most liberalized diet and consume 50% or greater of meals and ONS this admission       Nutrition Monitoring and Evaluation:   Behavioral-Environmental Outcomes:  None Identified   Food/Nutrient Intake Outcomes:  Diet Advancement/Tolerance,Food and Nutrient Intake,Supplement Intake  Physical Signs/Symptoms Outcomes:  Biochemical Data,Chewing or Swallowing,GI Status,Nutrition Focused Physical Findings,Weight     Discharge Planning:     Too soon to determine     Electronically signed by Last Huynh MS, RD, LD on 3/29/22 at 3:12 PM EDT    Contact: [phone]

## 2022-03-29 NOTE — PROGRESS NOTES
Shift: 0700/1900    Nursing assessment at handoff  stable    Rhythm changes Normal Sinus Rhythm/Sinus Tachycardia 81/114    Rhythm Intervention continue Amio PO    Drop in Urinary Output no, Moseley DC'd this shift -125mL in 4 hours    Changes to drips? No gtts    POD 1 ONLY: Pacing Wires Removed: []Yes           [] NO        [] Platelets < 52,567        [] Arrhythmia        [] Bradycardia        [] Valve Replacement         [] Pacing for Cardiac Index    LDA Insertion Date Date Changed (if needed) Discontinued Date   Art line 3/25/22  3/27/22   Central Line 3/25/22     ET Tube      Moseley 3/25/22  3/29/22   Chest Tube 3/25/22  #2 3/28/22   Leg Drain      Pacing Wires        Surgery, return to OR no     Hospital Course:  POD# 1 Night: Pt remained stable overnight. Pt was diaphoretic and had some SOB when getting up in the chair. Increased oxygen from 4L to 7L. 1230ml of u/o. Nicardipine gtt is off. POD# 1 Day (3/26)  Pt. Briefly restarted on nicardipine gtt @ 2.5mg/hr until lopressor 2.5mg Q3h was started. Pt. BP and HR well maintained within parameters on lopressor. Precedex gtt started around 0900 at .4 mcg/kg and titrated off by 1400 due to pt becoming overly lethargic. Upon initial assessment pt was breathing short and shallow  breathes due to severe pain. PCA pump with morphine was started 1mg/ml. Initial lockout was 1mg/10 mins up to 6mg/hr. Due to increased lethargy lockout changed to 1mg/15 mins up to 4mg/hr. Pt had decreased U/O over shift and was given 20mg of lasix. Lasix had little affect on U/O so pt was given 5% albumin 250ml. Pt is A&O x4 showing SR/ST on the monitor w/ well maintained SPO2 on 6L nasal canula. Pt still experiences dyspnea, HTN, and tachycardia w/ exertion. Chest tube sites dry, clean and intact. U/O increased w/ second dose of lasix. POD #2 PM: Pt's vitals were stable overnight. Tolerated getting up in the chair better than previous night.  Pain is better controlled with PCA pump and oxygen requirement remains the same at 5L NC.    POD #2 (3/27  7A - 7P)    Pt. A&O X4 showing SR/ST on the monitor w/ well maintained SPO2 on 5L nasal canula. L. Radial Art line DC and peñaloza DC. Lopressor dose increased from 2.5mg to 5mg IV and PRN hydralazine 5mg was added for HTN. Pt. Continues to have mild tachycardia even w/ increase in lopressor dose. Chest tubes switched to water seal w/ continued seroussanguinous output. Chest tube dressing dry,clean and intact. Air leak no longer noted in atrium #1. NG tube continued on low - intermittent suction w/ little to no output. All surgical sites well proximated and unremarkable. Pt. did not void 6 hrs after peñaloza was removed and was bladder scanned for a total of 538ml. Peñaloza catheter placed around 1500 per Dr. Julieta Witt order. During placement of catheter pt went into Afib- RVR HR in the 180's. EKG obtained confirming rhythm, Dr. Emmanuel Boyer notified by [facility] and CTS arrhythmic treatment order set was initiated per protocol. Pt received 150mg Amio bolus and started continues 450mg amio gtt running at 33.3 ml/hr. Pt. HR has decreased into the 130's-140's for about 45 mins and then increased back into the 170's-180's. Dr. Rodri Chaidez rounded around [phone] and was updated on the pt. SBP dropped into the 90's and Albumin 5% 250ml X2 was ordered. An additional 75mg bolus of amio was ordered and 5mg IVP of lopressor was given once SBP was >120. Pt. BP stable at end of shift w/ pt maintained in Afib-RVR HR in the 140's. POD #3 PM (3/28): Pt was in afib RVR heart rate in the 140-150s. Another 75mg amio bolus was given with 0.125 mg of digoxin. 4g of magnesium was also replaced. Pt converted to NSR around 2345, heart rate in the 80s and remained in this rhythm over night. POD #4 AM (3/29): Peñaloza DC'd, pt with no difficulty urinating in urinal. -150mL in 4 hrs plus x2 unmeasured output. Colace given, x2 BM. NSR/ST  BPM. 96%RA.      Changes in O2 requirements Most recent vitals /78   Pulse 114   Temp 98.1 °F (36.7 °C) (Oral)   Resp 18   Ht 6' (1.829 m)   Wt 233 lb 14.5 oz (106.1 kg)   SpO2 96%   BMI 31.72 kg/m²       Admission weight Weight: 230 lb (104.3 kg)     Today's weight   Wt Readings from Last 1 Encounters:   03/29/22 233 lb 14.5 oz (106.1 kg)         Lab Data:  CBC:   Recent Labs     03/29/22  0838   WBC 8.2   HGB 12.1*   HCT 35.7*   MCV 89.0        BMP:    Recent Labs     03/29/22  0838   *   K 4.1   CO2 24   BUN 27*   CREATININE 0.9     LIVR: No results for input(s): AST, ALT in the last 72 hours. PT/INR: No results for input(s): PROT, INR in the last 72 hours. APTT: No results for input(s): APTT in the last 72 hours.     Drip rates at handoff:    sodium chloride

## 2022-03-29 NOTE — PROGRESS NOTES
Shift: 8751-0815    Nursing assessment at handoff  stable    Rhythm changes Normal Sinus Rhythm/Sinus Tachycardia     Rhythm Intervention continue Amio gtt    Drop in Urinary Output no    Changes to drips? no    POD 1 ONLY: Pacing Wires Removed: []Yes           [] NO        [] Platelets < 79,280        [] Arrhythmia        [] Bradycardia        [] Valve Replacement         [] Pacing for Cardiac Index    LDA Insertion Date Date Changed (if needed) Discontinued Date   Art line 3/25/22  3/27/22   Central Line 3/25/22     ET Tube      Moseley 3/25/22     Chest Tube 3/25/22  #2 3/28/22   Leg Drain      Pacing Wires        Surgery, return to OR no     Hospital Course:  POD# 1 Night: Pt remained stable overnight. Pt was diaphoretic and had some SOB when getting up in the chair. Increased oxygen from 4L to 7L. 1230ml of u/o. Nicardipine gtt is off. POD# 1 Day (3/26)  Pt. Briefly restarted on nicardipine gtt @ 2.5mg/hr until lopressor 2.5mg Q3h was started. Pt. BP and HR well maintained within parameters on lopressor. Precedex gtt started around 0900 at .4 mcg/kg and titrated off by 1400 due to pt becoming overly lethargic. Upon initial assessment pt was breathing short and shallow  breathes due to severe pain. PCA pump with morphine was started 1mg/ml. Initial lockout was 1mg/10 mins up to 6mg/hr. Due to increased lethargy lockout changed to 1mg/15 mins up to 4mg/hr. Pt had decreased U/O over shift and was given 20mg of lasix. Lasix had little affect on U/O so pt was given 5% albumin 250ml. Pt is A&O x4 showing SR/ST on the monitor w/ well maintained SPO2 on 6L nasal canula. Pt still experiences dyspnea, HTN, and tachycardia w/ exertion. Chest tube sites dry, clean and intact. U/O increased w/ second dose of lasix. POD #2 PM: Pt's vitals were stable overnight. Tolerated getting up in the chair better than previous night.  Pain is better controlled with PCA pump and oxygen requirement remains the same at 5L NC.    POD #2 (3/27  7A - 7P)    Pt. A&O X4 showing SR/ST on the monitor w/ well maintained SPO2 on 5L nasal canula. L. Radial Art line DC and peñaloza DC. Lopressor dose increased from 2.5mg to 5mg IV and PRN hydralazine 5mg was added for HTN. Pt. Continues to have mild tachycardia even w/ increase in lopressor dose. Chest tubes switched to water seal w/ continued seroussanguinous output. Chest tube dressing dry,clean and intact. Air leak no longer noted in atrium #1. NG tube continued on low - intermittent suction w/ little to no output. All surgical sites well proximated and unremarkable. Pt. did not void 6 hrs after peñaloza was removed and was bladder scanned for a total of 538ml. Peñaloza catheter placed around 1500 per Dr. Gillian Chun order. During placement of catheter pt went into Afib- RVR HR in the 180's. EKG obtained confirming rhythm, Dr. Salvador Matters notified by [facility] and CTS arrhythmic treatment order set was initiated per protocol. Pt received 150mg Amio bolus and started continues 450mg amio gtt running at 33.3 ml/hr. Pt. HR has decreased into the 130's-140's for about 45 mins and then increased back into the 170's-180's. Dr. Usama Stiles rounded around [phone] and was updated on the pt. SBP dropped into the 90's and Albumin 5% 250ml X2 was ordered. An additional 75mg bolus of amio was ordered and 5mg IVP of lopressor was given once SBP was >120. Pt. BP stable at end of shift w/ pt maintained in Afib-RVR HR in the 140's. POD #3 PM: Pt was in afib RVR heart rate in the 140-150s. Another 75mg amio bolus was given with 0.125 mg of digoxin. 4g of magnesium was also replaced. Pt converted to NSR around 2345, heart rate in the 80s and remained in this rhythm over night. POD #4 PM: Pt in Sinus Rhythm (72-90 bpm) throughout shift and remained in this rhythm over night. Patient slept throughout most of shift and only pressed his PCA dose button a few times throughout shift.        Changes in O2 requirements Oxygen Therapy  SpO2: 100 %  Pulse Oximeter Device Mode: Continuous  Pulse Oximeter Device Location: Finger  O2 Device: Nasal cannula  O2 Flow Rate (L/min): 2 L/min  End Tidal CO2: 25 (%)     Most recent vitals /68   Pulse 80   Temp 98.6 °F (37 °C) (Axillary)   Resp 20   Ht 6' (1.829 m)   Wt 233 lb 14.5 oz (106.1 kg)   SpO2 100%   BMI 31.72 kg/m²       Admission weight Weight: 230 lb (104.3 kg)     Today's weight   Wt Readings from Last 1 Encounters:   03/29/22 233 lb 14.5 oz (106.1 kg)         Lab Data:  CBC:   Recent Labs     03/28/22 0425   WBC 10.3   HGB 12.5*   HCT 37.0*   MCV 89.8        BMP:    Recent Labs     03/28/22 0425   *   K 4.9   CO2 23   BUN 26*   CREATININE 0.8     LIVR: No results for input(s): AST, ALT in the last 72 hours. PT/INR: No results for input(s): PROT, INR in the last 72 hours. APTT: No results for input(s): APTT in the last 72 hours.     Drip rates at handoff:    amiodarone 1 mg/min (03/29/22 0006)    morphine      dextrose 5% and 0.45% NaCl with KCl 20 mEq 50 mL/hr at 03/28/22 2022    sodium chloride           Electronically signed by Giovani Hagen RN on 3/29/22 at 6:00 AM EDT

## 2022-03-29 NOTE — PROGRESS NOTES
[facility]  Progress Note    3/29/2022 9:18 AM  Surgeon: Gabby De Souza    POD#4   S/P : [place] esophagectomy    Post op follow-up    Subjective:   No major complaint or event. Up in chair this morning in NAD. Reports feeling better today than yesterday. Hospital course:  3/26 sinus tachycardia blood pressure was controlled weaned off  3/27 no major complaint or event overnight pain is well controlled diuresing well  3/28 AF/RVR overnight, amio gtt converted him back to SR 80's  3/29 Esophagram yesterday without extravasation of contrast. NGT removed as well as one of his chest tubes. Vital Signs: /70   Pulse 89   Temp 98.6 °F (37 °C) (Oral)   Resp 17   Ht 6' (1.829 m)   Wt 233 lb 14.5 oz (106.1 kg)   SpO2 97%   BMI 31.72 kg/m²  O2 Flow Rate (L/min): 1 L/min     I/O:      Intake/Output Summary (Last 24 hours) at 3/29/2022 0918  Last data filed at 3/29/2022 0800  Gross per 24 hour   Intake 2143.41 ml   Output 3580 ml   Net -1436.59 ml       Exam:   Cardiovascular: S1 plus S2 sinus rhythm     Pulmonary: Bilaterally clear no additional sound, chest x-ray shows atelectatic lower lobes    Abdomen: Soft, non-distended, non-tender, + bowel sounds     Urine output good 3400 ml in 24 hrs; Cr 0.9    CT: Chest tube #1  160 ml serosanguineous drainage in 24 hrs; no air leak. Chest tube #2 removed 3/28.            Radiology  Atelectatic lower lobes    Labs:   CBC:   Recent Labs     03/27/22  0405 03/28/22  0425 03/29/22  0838   WBC 13.4* 10.3 8.2   HGB 13.5 12.5* 12.1*   HCT 40.1* 37.0* 35.7*   MCV 89.8 89.8 89.0    201 210     BMP:   Recent Labs     03/27/22  0405 03/28/22  0425 03/29/22  0838   * 132* 133*   K 4.7 4.9 4.1   CL 98* 96* 96*   CO2 21 23 24   BUN 27* 26* 27*   CREATININE 0.9 0.8 0.9     Scheduled Meds:    amiodarone  400 mg Oral BID    Followed by   Madhav Navarro ON 4/1/2022] amiodarone  400 mg Oral Daily    metoprolol tartrate  50 mg Oral QAM    metoprolol tartrate  25 mg Oral Nightly    furosemide  20 mg IntraVENous BID    mupirocin   Nasal BID    famotidine (PEPCID) injection  20 mg IntraVENous BID    sodium chloride flush  5-40 mL IntraVENous 2 times per day    bacitracin   Topical Daily    albuterol  2.5 mg Nebulization 4x daily     Continuous Infusions:    sodium chloride         Patient Active Problem List   Diagnosis    NSTEMI (non-ST elevation myocardial infarction) ([facility] [address]    HTN (hypertension)    CAD (coronary artery disease)    HLD (hyperlipidemia)    Esophageal cancer (HCC)         Assessment/Plan:  Status post [place] Jose esophagectomy  Esophagram 3/28 no evidence of extravasation. this morning. Will start clear liquid diet and liquid meds PO (ok for crushed meds)  Hydralazine as needed for blood pressure above 140  Continue with diuresis, will decrease to BID    Constipation: +flatus but still no BM. Will start liquid colace. Monitor. Atrial fibrillation:   Went into AF night of 3/27, -160's  Amiodarone drip started, converted back to SR. Will start PO amiodarone this morning and stop the drip. Restarting home metoprolol. Lines/tubes: will d/c peñaloza catheter this morning.      Prophy: Pepcid and Lovenox  ______________________________________________________    MICHAEL Geiger   3/29/22   09:37 AM

## 2022-03-29 NOTE — PLAN OF CARE
Problem: Nutrition  Goal: Optimal nutrition therapy  Outcome: Ongoing  Note: Nutrition Problem #1: Inadequate oral intake  Intervention: Food and/or Nutrient Delivery: Continue Current Diet,Start Oral Nutrition Supplement  Nutritional Goals: Pt will tolerate most liberalized diet and consume 50% or greater of meals and ONS this admission

## 2022-03-29 NOTE — CARE COORDINATION
CM met with pt and wife at bedside. Pt from home with wife. Plans to return home. Is agreeable to home care if recommended. They live in [address] They would hope for pt to only need home care and then would be able to transition to outpt PT/OT. Wife states she goes to [address] [place] [address] in  [address] [phone]. Per website review they do not do home care. Previous referral to [facility].

## 2022-03-29 NOTE — PROGRESS NOTES
Wasted 8mL of Morphine with Everardo Pisano RN. Med disposed of into the Rx Destroyer per protocol.     Primary Nurse eSignature: Electronically signed by Nolberto Zimmerman RN on 3/29/22 at 10:26 AM EDT    **SHARE this note so that the co-signing nurse is able to place an eSignature**    Co-signer eSignature: {Esignature:[id number]}

## 2022-03-29 NOTE — PROGRESS NOTES
Occupational Therapy  Facility/Department: [facility] C2 CARD TELEMETRY  Daily Treatment Note  NAME: Audie Chaidez  : 1950  MRN: [id number]    Date of Service: 3/29/2022    Discharge Recommendations:  24 hour supervision or assist,Home with Home health OT       Assessment   Performance deficits / Impairments: Decreased functional mobility ; Decreased endurance;Decreased ADL status; Decreased balance;Decreased strength;Decreased posture  Assessment: ICU monitering. Pt is making good progress towards goals. Pt is currently CGA for functional mobility and transfers, setup for grooming tasks at sink, and min assist for LB dressing. Vitals stayed [facility] this date. Recommend 24H/supv with HHOT at discharge pending vitals remain stable. Plan to cont OT POC. OT Education: OT Role;Plan of Care;Precautions  Patient Education: Disease specific: POC, DME, Tranfer trainng, importance of use of RED/nurse call light for ADL needs/assist with transfers/repositioning. Pt verbalized understanding  Barriers to Learning: none perceived  REQUIRES OT FOLLOW UP: Yes  Activity Tolerance  Activity Tolerance: Patient Tolerated treatment well  Activity Tolerance: /83, HR 90. O2 95%  Safety Devices  Safety Devices in place: Yes  Type of devices: Call light within reach; Left in chair;Nurse notified;Gait belt         Patient Diagnosis(es): The encounter diagnosis was Malignant neoplasm of esophagus, unspecified location [facility]). has a past medical history of CAD (coronary artery disease), History of tobacco use, Hyperlipidemia, Hypertension, Plantar fasciitis, and Prostate cancer ([facility] [address] has a past surgical history that includes Prostatectomy (2013); Coronary angioplasty with stent; Percutaneous Transluminal Coronary Angio; Cardiac surgery (); Penile prosthesis placement (); and Esophagectomy (N/A, 3/25/2022).     Restrictions  Restrictions/Precautions  Restrictions/Precautions: General Precautions,Fall Risk,Up as Tolerated  Position Activity Restriction  Other position/activity restrictions: 2 chest tubes, NG tube (can now be clamped intermittently per RN Francisco Javier Neumann)  Subjective   General  Chart Reviewed: Yes,Progress Notes,Labs,Previous Admission,History and Physical  Patient assessed for rehabilitation services?: Yes  Response to previous treatment: Patient with no complaints from previous session  Family / Caregiver Present: Yes (wife and daughter)  Referring Practitioner: Paulnia Matute MD 3/25/22  Diagnosis: ESOPHAGEAL CANCER; s/p Triston Raijewelkg Jerez esophagectomy 3/25/22  Subjective  Subjective: Pt seated in chair upon OT arrival and agreeable to OT session. Pt's wife present throughout therapy session with daughter arriving midway through. General Comment  Comments: RN cleared pt for therapy  Vital Signs  Patient Currently in Pain: Denies   Orientation  Orientation  Overall Orientation Status: Within Functional Limits  Objective    ADL  Grooming: Setup  LE Dressing: Minimal assistance (scrub pants)  Toileting: Contact guard assistance        Balance  Sitting Balance: Supervision  Standing Balance: Contact guard assistance  Standing Balance  Time: ~10-12 minutes  Activity: transfer training; grooming tasks at sink; functional moblity  Comment: Vitals stayed [place]. Functional Mobility  Functional - Mobility Device: 4-Wheeled Walker  Activity: Other (hallway)  Assist Level: Contact guard assistance  Functional Mobility Comments: Pt competed with increased time. Fatigue noted however pt's vitals stayed [facility]. Toilet Transfers  Toilet Transfer: Contact guard assistance (grab bars)  Bed mobility  Supine to Sit: Unable to assess  Sit to Supine: Unable to assess  Transfers  Sit to stand: Contact guard assistance  Stand to sit: Contact guard assistance     Cognition  Overall Cognitive Status: Exceptions  Arousal/Alertness: Appropriate responses to stimuli  Following Commands:  Follows one step commands consistently  Attention Span: Appears intact  Memory: Appears intact  Safety Judgement: Decreased awareness of need for assistance;Decreased awareness of need for safety  Problem Solving: Decreased awareness of errors  Insights: Decreased awareness of deficits  Initiation: Does not require cues  Sequencing: Does not require cues        Type of ROM/Therapeutic Exercise  Type of ROM/Therapeutic Exercise: AROM  Comment: BUE seated in chair  Exercises  Elbow Flexion: RUE only x 10 reps  Elbow Extension: RUE only x 10 reps  Supination: B x 10 reps  Pronation: B x 10 reps  Wrist Flexion: B x 10 reps  Wrist Extension: B x 10 reps  Finger Flexion: B x 10 reps  Finger Extension: B x 10 reps  Other: Edema in B hands looks better this date compared to previous sessions. Plan   Plan  Times per week: 4-6x's a week while in ICU  Current Treatment Recommendations: Nika Wild Mobility Training,Safety Education & Training,Positioning,Self-Care / ADL,Endurance Training,Patient/Caregiver Education & Training    AM-PAC Score  AM-PAC Inpatient Daily Activity Raw Score: 17 (03/29/22 1637)  AM-PAC Inpatient ADL T-Scale Score : 37.26 (03/29/22 1637)  ADL Inpatient CMS 0-100% Score: 50.11 (03/29/22 1637)  ADL Inpatient CMS G-Code Modifier : CK (03/29/22 1637)    Goals  Short term goals  Time Frame for Short term goals: 2 weeks 4/09  Short term goal 1: Pt will complete toilet transfers with SBA by 4/01; ongoing- CGA 3/29  Short term goal 2: Pt will complete LE dressing with CGA; nearly met 3/29  Short term goal 3: Pt will complete standing level ADLs with SBA for balance; met 3/29 however will follow up to ensure consistency with vitals  Patient Goals   Patient goals : \"to return home\"       Therapy Time   Individual Concurrent Group Co-treatment   Time In 1355         Time Out 1433         Minutes 38              If pt is unable to be seen after this session, please let this note serve as discharge summary.   Please see case management note for discharge disposition. Thank you.     Kym Valencia, OT

## 2022-03-29 NOTE — PLAN OF CARE
Problem: Pain:  Goal: Pain level will decrease  Description: Pain level will decrease  Outcome: Ongoing  Goal: Control of acute pain  Description: Control of acute pain  Outcome: Ongoing  Goal: Control of chronic pain  Description: Control of chronic pain  Outcome: Ongoing     Vito Halsted, RN

## 2022-03-30 ENCOUNTER — APPOINTMENT (OUTPATIENT)
Dept: GENERAL RADIOLOGY | Age: 72
DRG: 327 | End: 2022-03-30
Attending: THORACIC SURGERY (CARDIOTHORACIC VASCULAR SURGERY)
Payer: COMMERCIAL

## 2022-03-30 ENCOUNTER — APPOINTMENT (OUTPATIENT)
Dept: VASCULAR LAB | Age: 72
DRG: 327 | End: 2022-03-30
Attending: THORACIC SURGERY (CARDIOTHORACIC VASCULAR SURGERY)
Payer: COMMERCIAL

## 2022-03-30 LAB
ANION GAP SERPL CALCULATED.3IONS-SCNC: 12 MMOL/L (ref 3–16)
BASOPHILS ABSOLUTE: 0 K/UL (ref 0–0.2)
BASOPHILS RELATIVE PERCENT: 0.2 %
BUN BLDV-MCNC: 25 MG/DL (ref 7–20)
CALCIUM SERPL-MCNC: 8.7 MG/DL (ref 8.3–10.6)
CHLORIDE BLD-SCNC: 94 MMOL/L (ref 99–110)
CO2: 25 MMOL/L (ref 21–32)
CREAT SERPL-MCNC: 0.9 MG/DL (ref 0.8–1.3)
EOSINOPHILS ABSOLUTE: 0.1 K/UL (ref 0–0.6)
EOSINOPHILS RELATIVE PERCENT: 1.7 %
GFR AFRICAN AMERICAN: >60
GFR NON-AFRICAN AMERICAN: >60
GLUCOSE BLD-MCNC: 97 MG/DL (ref 70–99)
HCT VFR BLD CALC: 34.9 % (ref 40.5–52.5)
HEMOGLOBIN: 12 G/DL (ref 13.5–17.5)
LYMPHOCYTES ABSOLUTE: 0.5 K/UL (ref 1–5.1)
LYMPHOCYTES RELATIVE PERCENT: 7.3 %
MCH RBC QN AUTO: 30.7 PG (ref 26–34)
MCHC RBC AUTO-ENTMCNC: 34.4 G/DL (ref 31–36)
MCV RBC AUTO: 89.1 FL (ref 80–100)
MONOCYTES ABSOLUTE: 1.1 K/UL (ref 0–1.3)
MONOCYTES RELATIVE PERCENT: 15.4 %
NEUTROPHILS ABSOLUTE: 5.5 K/UL (ref 1.7–7.7)
NEUTROPHILS RELATIVE PERCENT: 75.4 %
PDW BLD-RTO: 12.8 % (ref 12.4–15.4)
PLATELET # BLD: 209 K/UL (ref 135–450)
PMV BLD AUTO: 5.7 FL (ref 5–10.5)
POTASSIUM REFLEX MAGNESIUM: 4.2 MMOL/L (ref 3.5–5.1)
RBC # BLD: 3.92 M/UL (ref 4.2–5.9)
SODIUM BLD-SCNC: 131 MMOL/L (ref 136–145)
WBC # BLD: 7.3 K/UL (ref 4–11)

## 2022-03-30 PROCEDURE — 93971 EXTREMITY STUDY: CPT

## 2022-03-30 PROCEDURE — 71045 X-RAY EXAM CHEST 1 VIEW: CPT

## 2022-03-30 PROCEDURE — 6370000000 HC RX 637 (ALT 250 FOR IP): Performed by: NURSE PRACTITIONER

## 2022-03-30 PROCEDURE — 99221 1ST HOSP IP/OBS SF/LOW 40: CPT | Performed by: SURGERY

## 2022-03-30 PROCEDURE — 97530 THERAPEUTIC ACTIVITIES: CPT

## 2022-03-30 PROCEDURE — 85025 COMPLETE CBC W/AUTO DIFF WBC: CPT

## 2022-03-30 PROCEDURE — 36415 COLL VENOUS BLD VENIPUNCTURE: CPT

## 2022-03-30 PROCEDURE — 80048 BASIC METABOLIC PNL TOTAL CA: CPT

## 2022-03-30 PROCEDURE — 6360000002 HC RX W HCPCS: Performed by: NURSE PRACTITIONER

## 2022-03-30 PROCEDURE — 97110 THERAPEUTIC EXERCISES: CPT

## 2022-03-30 PROCEDURE — 94640 AIRWAY INHALATION TREATMENT: CPT

## 2022-03-30 PROCEDURE — 97116 GAIT TRAINING THERAPY: CPT

## 2022-03-30 PROCEDURE — 6360000002 HC RX W HCPCS: Performed by: THORACIC SURGERY (CARDIOTHORACIC VASCULAR SURGERY)

## 2022-03-30 PROCEDURE — 2000000000 HC ICU R&B

## 2022-03-30 PROCEDURE — 97535 SELF CARE MNGMENT TRAINING: CPT

## 2022-03-30 PROCEDURE — 2580000003 HC RX 258: Performed by: THORACIC SURGERY (CARDIOTHORACIC VASCULAR SURGERY)

## 2022-03-30 PROCEDURE — 94669 MECHANICAL CHEST WALL OSCILL: CPT

## 2022-03-30 PROCEDURE — 6370000000 HC RX 637 (ALT 250 FOR IP): Performed by: THORACIC SURGERY (CARDIOTHORACIC VASCULAR SURGERY)

## 2022-03-30 PROCEDURE — 99024 POSTOP FOLLOW-UP VISIT: CPT | Performed by: NURSE PRACTITIONER

## 2022-03-30 RX ORDER — FAMOTIDINE 20 MG/1
20 TABLET, FILM COATED ORAL DAILY
Status: DISCONTINUED | OUTPATIENT
Start: 2022-03-30 | End: 2022-03-31 | Stop reason: HOSPADM

## 2022-03-30 RX ORDER — FUROSEMIDE 40 MG/1
40 TABLET ORAL DAILY
Status: DISCONTINUED | OUTPATIENT
Start: 2022-03-30 | End: 2022-03-31 | Stop reason: HOSPADM

## 2022-03-30 RX ADMIN — SODIUM CHLORIDE, PRESERVATIVE FREE 10 ML: 5 INJECTION INTRAVENOUS at 08:39

## 2022-03-30 RX ADMIN — ENOXAPARIN SODIUM 40 MG: 40 INJECTION SUBCUTANEOUS at 08:38

## 2022-03-30 RX ADMIN — METOPROLOL TARTRATE 50 MG: 50 TABLET ORAL at 08:38

## 2022-03-30 RX ADMIN — ALBUTEROL SULFATE 2.5 MG: 2.5 SOLUTION RESPIRATORY (INHALATION) at 07:33

## 2022-03-30 RX ADMIN — ENOXAPARIN SODIUM 60 MG: 60 INJECTION SUBCUTANEOUS at 15:29

## 2022-03-30 RX ADMIN — AMIODARONE HYDROCHLORIDE 400 MG: 200 TABLET ORAL at 20:12

## 2022-03-30 RX ADMIN — ALBUTEROL SULFATE 2.5 MG: 2.5 SOLUTION RESPIRATORY (INHALATION) at 11:18

## 2022-03-30 RX ADMIN — ALBUTEROL SULFATE 2.5 MG: 2.5 SOLUTION RESPIRATORY (INHALATION) at 20:17

## 2022-03-30 RX ADMIN — AMIODARONE HYDROCHLORIDE 400 MG: 200 TABLET ORAL at 08:38

## 2022-03-30 RX ADMIN — CARBOXYMETHYLCELLULOSE SODIUM 1 DROP: 10 GEL OPHTHALMIC at 11:23

## 2022-03-30 RX ADMIN — ENOXAPARIN SODIUM 100 MG: 100 INJECTION SUBCUTANEOUS at 20:12

## 2022-03-30 RX ADMIN — MUPIROCIN: 20 OINTMENT TOPICAL at 20:13

## 2022-03-30 RX ADMIN — MUPIROCIN: 20 OINTMENT TOPICAL at 08:39

## 2022-03-30 RX ADMIN — FUROSEMIDE 40 MG: 40 TABLET ORAL at 08:39

## 2022-03-30 RX ADMIN — METOPROLOL TARTRATE 25 MG: 25 TABLET, FILM COATED ORAL at 20:13

## 2022-03-30 RX ADMIN — ALBUTEROL SULFATE 2.5 MG: 2.5 SOLUTION RESPIRATORY (INHALATION) at 16:20

## 2022-03-30 RX ADMIN — FAMOTIDINE 20 MG: 20 TABLET, FILM COATED ORAL at 08:38

## 2022-03-30 RX ADMIN — SODIUM CHLORIDE, PRESERVATIVE FREE 10 ML: 5 INJECTION INTRAVENOUS at 20:13

## 2022-03-30 ASSESSMENT — PAIN DESCRIPTION - LOCATION
LOCATION: ARM
LOCATION: ARM
LOCATION: ABDOMEN;CHEST

## 2022-03-30 ASSESSMENT — PAIN DESCRIPTION - PAIN TYPE
TYPE: ACUTE PAIN
TYPE: ACUTE PAIN

## 2022-03-30 ASSESSMENT — PAIN SCALES - GENERAL
PAINLEVEL_OUTOF10: 0
PAINLEVEL_OUTOF10: 0
PAINLEVEL_OUTOF10: 2
PAINLEVEL_OUTOF10: 0
PAINLEVEL_OUTOF10: 0

## 2022-03-30 ASSESSMENT — PAIN DESCRIPTION - DESCRIPTORS
DESCRIPTORS: ACHING;DISCOMFORT
DESCRIPTORS: ACHING;DISCOMFORT

## 2022-03-30 ASSESSMENT — PAIN DESCRIPTION - PROGRESSION: CLINICAL_PROGRESSION: NOT CHANGED

## 2022-03-30 ASSESSMENT — PAIN DESCRIPTION - ORIENTATION
ORIENTATION: LEFT;MID
ORIENTATION: LEFT

## 2022-03-30 ASSESSMENT — PAIN DESCRIPTION - FREQUENCY
FREQUENCY: INTERMITTENT
FREQUENCY: INTERMITTENT

## 2022-03-30 NOTE — PROGRESS NOTES
Physical Therapy  Attempted to see pt for therapy. Per RN, pt just returned to bed and is resting comfortably. Will hold therapy at this time. Thank you.   Sangeetha Nolan, PT, DPT

## 2022-03-30 NOTE — PROGRESS NOTES
Occupational Therapy  Facility/Department: [facility] C2 CARD TELEMETRY  Daily Treatment Note  NAME: Sadaf Gandara  : 1950  MRN: [id number]    Date of Service: 3/30/2022    Discharge Recommendations:  24 hour supervision or assist,Home with Home health OT       Assessment   Performance deficits / Impairments: Decreased functional mobility ; Decreased endurance;Decreased ADL status; Decreased balance;Decreased strength;Decreased posture  Assessment: ICU monitering. Pt is making good progress towards goals. Pt is currently CGA/SBA for functional mobility and transfers, setup for grooming tasks at sink, and SBA for LB dressing. Vitals stayed [facility] this date. Recommend 24H/supv with HHOT at discharge pending vitals remain stable. Plan to cont OT POC. OT Education: OT Role;Plan of Care;Precautions  Patient Education: Disease specific: POC, Tranfer trainng, importance of OOB activity. Pt verbalized understanding  Barriers to Learning: none perceived  REQUIRES OT FOLLOW UP: Yes  Activity Tolerance  Activity Tolerance: Patient Tolerated treatment well  Activity Tolerance: /74, HR 89. O2 100%  Safety Devices  Safety Devices in place: Yes  Type of devices: Call light within reach; Left in chair;Nurse notified;Gait belt         Patient Diagnosis(es): The encounter diagnosis was Malignant neoplasm of esophagus, unspecified location [facility]). has a past medical history of CAD (coronary artery disease), History of tobacco use, Hyperlipidemia, Hypertension, Plantar fasciitis, and Prostate cancer ([facility] [address] has a past surgical history that includes Prostatectomy (2013); Coronary angioplasty with stent; Percutaneous Transluminal Coronary Angio; Cardiac surgery (); Penile prosthesis placement (); and Esophagectomy (N/A, 3/25/2022).     Restrictions  Restrictions/Precautions  Restrictions/Precautions: General Precautions,Up as Tolerated,Fall Risk  Position Activity Restriction  Other position/activity restrictions: 2 chest tubes, NG tube (can now be clamped intermittently per RN Grey Caputo)  Subjective   General  Chart Reviewed: Hilario Thompson and Physical  Patient assessed for rehabilitation services?: Yes  Response to previous treatment: Patient with no complaints from previous session  Family / Caregiver Present: Yes (wife)  Referring Practitioner: Fior Colbert MD 3/25/22  Diagnosis: ESOPHAGEAL CANCER; s/p Triston Barrios Roderick esophagectomy 3/25/22  Subjective  Subjective: Pt seated in chair upon OT arrival and agreeable to OT session. Wife arrived midway through session. General Comment  Comments: RN cleared pt for therapy  Pain Assessment  Pain Assessment: 0-10  Pain Level: 2  Pain Type: Acute pain  Pain Location: Arm  Pain Orientation: Left;Mid  Pain Descriptors: Aching;Discomfort  Pain Frequency: Intermittent  Non-Pharmaceutical Pain Intervention(s): Heat applied; Emotional support; Ambulation/Increased Activity;Repositioned  Response to Pain Intervention: Patient Satisfied  Pre Treatment Pain Screening  Intervention List: Patient able to continue with treatment  Vital Signs  Patient Currently in Pain: Yes   Orientation  Orientation  Overall Orientation Status: Within Functional Limits  Objective    ADL  Feeding: Independent  Grooming: Setup;Supervision  LE Dressing: Minimal assistance (briefs and pants)  Toileting: Supervision (Pt completed 3/3 parts with SBA; Pt able to complete posterior pericare after bowel movement)        Balance  Sitting Balance: Independent  Standing Balance: Stand by assistance  Standing Balance  Time: ~10 minutes  Activity: transfer training; grooming tasks at sink; functional moblity  Comment: Vitals stayed [place]. Functional Mobility  Functional - Mobility Device: 4-Wheeled Walker  Activity: Other; To/from bathroom (hallway)  Assist Level: Contact guard assistance  Functional Mobility Comments: Pt able to complete room mobility without reports of fatigue however pt reported min/mod fatigue during functional mobility  Toilet Transfers  Toilet Transfer: Contact guard assistance (grab bars)  Bed mobility  Supine to Sit: Unable to assess  Sit to Supine: Unable to assess  Comment: Pt in chair upon entry and request to return to chair at end of session  Transfers  Sit to stand: Contact guard assistance  Stand to sit: Contact guard assistance      Cognition  Overall Cognitive Status: Shila 89  Times per week: 4-6x's a week while in ICU  Current Treatment Recommendations: Strengthening,Balance Training,Functional Mobility Training,Safety Education & Training,Positioning,Self-Care / ADL,Endurance Training,Patient/Caregiver Education & Training    AM-PAC Score  AM-PAC Inpatient Daily Activity Raw Score: 18 (03/30/22 1143)  AM-PAC Inpatient ADL T-Scale Score : 38.66 (03/30/22 1143)  ADL Inpatient CMS 0-100% Score: 46.65 (03/30/22 1143)  ADL Inpatient CMS G-Code Modifier : CK (03/30/22 1143)    Goals  Short term goals  Time Frame for Short term goals: 2 weeks 4/09  Short term goal 1: Pt will complete toilet transfers with SBA by 4/01; ongoing- CGA 3/30  Short term goal 2: Pt will complete LE dressing with CGA; met 3/30; NEW GOAL: Pt will complete LE dressing with supv  Short term goal 3: Pt will complete standing level ADLs with SBA for balance; met 3/29 however will follow up to ensure consistency with vitals- ongoing; pt report increased fatigue 3/30  Patient Goals   Patient goals : \"to return home\"       Therapy Time   Individual Concurrent Group Co-treatment   Time In 0827         Time Out 0905         Minutes 38              If pt is unable to be seen after this session, please let this note serve as discharge summary. Please see case management note for discharge disposition. Thank you.       Jaymie Macedo, OT

## 2022-03-30 NOTE — PROGRESS NOTES
Shift: [id number]    Nursing assessment at handoff  stable    Rhythm changes Normal Sinus Rhythm/Sinus Tachycardia 81/114    Rhythm Intervention continue Amio PO    Drop in Urinary Output no, Moseley DC'd this shift -125mL in 4 hours    Changes to drips? No gtts    POD 1 ONLY: Pacing Wires Removed: []Yes           [] NO        [] Platelets < 97,884        [] Arrhythmia        [] Bradycardia        [] Valve Replacement         [] Pacing for Cardiac Index    LDA Insertion Date Date Changed (if needed) Discontinued Date   Art line 3/25/22  3/27/22   Central Line 3/25/22     ET Tube      Moseley 3/25/22  3/29/22   Chest Tube 3/25/22  #2 3/28/22   Leg Drain      Pacing Wires        Surgery, return to OR no     Hospital Course:  POD# 1 Night: Pt remained stable overnight. Pt was diaphoretic and had some SOB when getting up in the chair. Increased oxygen from 4L to 7L. 1230ml of u/o. Nicardipine gtt is off. POD# 1 Day (3/26)  Pt. Briefly restarted on nicardipine gtt @ 2.5mg/hr until lopressor 2.5mg Q3h was started. Pt. BP and HR well maintained within parameters on lopressor. Precedex gtt started around 0900 at .4 mcg/kg and titrated off by 1400 due to pt becoming overly lethargic. Upon initial assessment pt was breathing short and shallow  breathes due to severe pain. PCA pump with morphine was started 1mg/ml. Initial lockout was 1mg/10 mins up to 6mg/hr. Due to increased lethargy lockout changed to 1mg/15 mins up to 4mg/hr. Pt had decreased U/O over shift and was given 20mg of lasix. Lasix had little affect on U/O so pt was given 5% albumin 250ml. Pt is A&O x4 showing SR/ST on the monitor w/ well maintained SPO2 on 6L nasal canula. Pt still experiences dyspnea, HTN, and tachycardia w/ exertion. Chest tube sites dry, clean and intact. U/O increased w/ second dose of lasix. POD #2 PM: Pt's vitals were stable overnight. Tolerated getting up in the chair better than previous night.  Pain is better controlled with PCA pump and oxygen requirement remains the same at 5L NC.    POD #2 (3/27  7A - 7P)    Pt. A&O X4 showing SR/ST on the monitor w/ well maintained SPO2 on 5L nasal canula. L. Radial Art line DC and peñaloza DC. Lopressor dose increased from 2.5mg to 5mg IV and PRN hydralazine 5mg was added for HTN. Pt. Continues to have mild tachycardia even w/ increase in lopressor dose. Chest tubes switched to water seal w/ continued seroussanguinous output. Chest tube dressing dry,clean and intact. Air leak no longer noted in atrium #1. NG tube continued on low - intermittent suction w/ little to no output. All surgical sites well proximated and unremarkable. Pt. did not void 6 hrs after peñaloza was removed and was bladder scanned for a total of 538ml. Peñaloza catheter placed around 1500 per Dr. Kassie Harper order. During placement of catheter pt went into Afib- RVR HR in the 180's. EKG obtained confirming rhythm, Dr. Roula Tam notified by [facility] and [facility] arrhythmic treatment order set was initiated per protocol. Pt received 150mg Amio bolus and started continues 450mg amio gtt running at 33.3 ml/hr. Pt. HR has decreased into the 130's-140's for about 45 mins and then increased back into the 170's-180's. Dr. Morena Brasher rounded around [phone] and was updated on the pt. SBP dropped into the 90's and Albumin 5% 250ml X2 was ordered. An additional 75mg bolus of amio was ordered and 5mg IVP of lopressor was given once SBP was >120. Pt. BP stable at end of shift w/ pt maintained in Afib-RVR HR in the 140's. POD #3 PM (3/28): Pt was in afib RVR heart rate in the 140-150s. Another 75mg amio bolus was given with 0.125 mg of digoxin. 4g of magnesium was also replaced. Pt converted to NSR around 2345, heart rate in the 80s and remained in this rhythm over night. POD #4 AM (3/29): Peñaloza DC'd, pt with no difficulty urinating in urinal. -150mL in 4 hrs plus x2 unmeasured output. Colace given, x2 BM. NSR/ST  BPM. 96%RA.      POD #4 PM (3/29):  Patient voided without difficulty in urinal.  Urine output = 725 mL; Chest Tube output = 35 mL. Two bowel movements overnight. -280 mL total in 12 hour shift. Normal Sinus Rhythm/Sinus Tach:  BPM.   BP stable throughout shift: SBP = 115-136 / DBP = 59-70  Oxygen saturation = 90-97% on room air. No PRN medications required during shift. Patient slept soundly through most of the shift. Patient denied pain. POD#5 AM (3/30): CT removed. DVT found in Left arm. Vascular consulted no intervention needed. Lovenox ordered BID    Changes in O2 requirements: none    Most recent vitals BP (!) 151/73   Pulse 103   Temp 97.6 °F (36.4 °C)   Resp 18   Ht 6' (1.829 m)   Wt 226 lb 10.1 oz (102.8 kg)   SpO2 98%   BMI 30.74 kg/m²       Admission weight Weight: 230 lb (104.3 kg)     Today's weight   Wt Readings from Last 1 Encounters:   03/30/22 226 lb 10.1 oz (102.8 kg)         Lab Data:  CBC:   Recent Labs     03/30/22  0416   WBC 7.3   HGB 12.0*   HCT 34.9*   MCV 89.1        BMP:    Recent Labs     03/30/22  0416   *   K 4.2   CO2 25   BUN 25*   CREATININE 0.9     LIVR: No results for input(s): AST, ALT in the last 72 hours. PT/INR: No results for input(s): PROT, INR in the last 72 hours. APTT: No results for input(s): APTT in the last 72 hours.     Drip rates at handoff: PERLA Magaña, RN

## 2022-03-30 NOTE — PROGRESS NOTES
Shift: [id number]    Nursing assessment at handoff  stable    Rhythm changes Normal Sinus Rhythm/Sinus Tachycardia 81/114    Rhythm Intervention continue Amio PO    Drop in Urinary Output no, Moseley DC'd this shift -125mL in 4 hours    Changes to drips? No gtts    POD 1 ONLY: Pacing Wires Removed: []Yes           [] NO        [] Platelets < 27,784        [] Arrhythmia        [] Bradycardia        [] Valve Replacement         [] Pacing for Cardiac Index    LDA Insertion Date Date Changed (if needed) Discontinued Date   Art line 3/25/22  3/27/22   Central Line 3/25/22     ET Tube      Moseley 3/25/22  3/29/22   Chest Tube 3/25/22  #2 3/28/22   Leg Drain      Pacing Wires        Surgery, return to OR no     Hospital Course:  POD# 1 Night: Pt remained stable overnight. Pt was diaphoretic and had some SOB when getting up in the chair. Increased oxygen from 4L to 7L. 1230ml of u/o. Nicardipine gtt is off. POD# 1 Day (3/26)  Pt. Briefly restarted on nicardipine gtt @ 2.5mg/hr until lopressor 2.5mg Q3h was started. Pt. BP and HR well maintained within parameters on lopressor. Precedex gtt started around 0900 at .4 mcg/kg and titrated off by 1400 due to pt becoming overly lethargic. Upon initial assessment pt was breathing short and shallow  breathes due to severe pain. PCA pump with morphine was started 1mg/ml. Initial lockout was 1mg/10 mins up to 6mg/hr. Due to increased lethargy lockout changed to 1mg/15 mins up to 4mg/hr. Pt had decreased U/O over shift and was given 20mg of lasix. Lasix had little affect on U/O so pt was given 5% albumin 250ml. Pt is A&O x4 showing SR/ST on the monitor w/ well maintained SPO2 on 6L nasal canula. Pt still experiences dyspnea, HTN, and tachycardia w/ exertion. Chest tube sites dry, clean and intact. U/O increased w/ second dose of lasix. POD #2 PM: Pt's vitals were stable overnight. Tolerated getting up in the chair better than previous night.  Pain is better controlled with PCA pump and oxygen requirement remains the same at 5L NC.    POD #2 (3/27  7A - 7P)    Pt. A&O X4 showing SR/ST on the monitor w/ well maintained SPO2 on 5L nasal canula. L. Radial Art line DC and peñaloza DC. Lopressor dose increased from 2.5mg to 5mg IV and PRN hydralazine 5mg was added for HTN. Pt. Continues to have mild tachycardia even w/ increase in lopressor dose. Chest tubes switched to water seal w/ continued seroussanguinous output. Chest tube dressing dry,clean and intact. Air leak no longer noted in atrium #1. NG tube continued on low - intermittent suction w/ little to no output. All surgical sites well proximated and unremarkable. Pt. did not void 6 hrs after peñaloza was removed and was bladder scanned for a total of 538ml. Peñaloza catheter placed around 1500 per Dr. Preethi Yusuf order. During placement of catheter pt went into Afib- RVR HR in the 180's. EKG obtained confirming rhythm, Dr. Mina Holguin notified by [facility] and [facility] arrhythmic treatment order set was initiated per protocol. Pt received 150mg Amio bolus and started continues 450mg amio gtt running at 33.3 ml/hr. Pt. HR has decreased into the 130's-140's for about 45 mins and then increased back into the 170's-180's. Dr. Raisa Wilkinson rounded around [phone] and was updated on the pt. SBP dropped into the 90's and Albumin 5% 250ml X2 was ordered. An additional 75mg bolus of amio was ordered and 5mg IVP of lopressor was given once SBP was >120. Pt. BP stable at end of shift w/ pt maintained in Afib-RVR HR in the 140's. POD #3 PM (3/28): Pt was in afib RVR heart rate in the 140-150s. Another 75mg amio bolus was given with 0.125 mg of digoxin. 4g of magnesium was also replaced. Pt converted to NSR around 2345, heart rate in the 80s and remained in this rhythm over night. POD #4 AM (3/29): Peñaloza DC'd, pt with no difficulty urinating in urinal. -150mL in 4 hrs plus x2 unmeasured output. Colace given, x2 BM. NSR/ST  BPM. 96%RA.      POD #4 PM (3/29):  Patient voided without difficulty in urinal.  Urine output = 725 mL; Chest Tube output = 35 mL. Two bowel movements overnight. -280 mL total in 12 hour shift. Normal Sinus Rhythm/Sinus Tach:  BPM.   BP stable throughout shift: SBP = 115-136 / DBP = 59-70  Oxygen saturation = 90-97% on room air. No PRN medications required during shift. Patient slept soundly through most of the shift. Patient denied pain. Changes in O2 requirements: none    Most recent vitals /75   Pulse 97   Temp 98 °F (36.7 °C) (Axillary)   Resp 18   Ht 6' (1.829 m)   Wt 226 lb 10.1 oz (102.8 kg)   SpO2 94%   BMI 30.74 kg/m²       Admission weight Weight: 230 lb (104.3 kg)     Today's weight   Wt Readings from Last 1 Encounters:   03/30/22 226 lb 10.1 oz (102.8 kg)         Lab Data:  CBC:   Recent Labs     03/30/22  0416   WBC 7.3   HGB 12.0*   HCT 34.9*   MCV 89.1        BMP:    Recent Labs     03/30/22  0416   *   K 4.2   CO2 25   BUN 25*   CREATININE 0.9     LIVR: No results for input(s): AST, ALT in the last 72 hours. PT/INR: No results for input(s): PROT, INR in the last 72 hours. APTT: No results for input(s): APTT in the last 72 hours.     Drip rates at handoff:    sodium chloride         Adam Haque RN

## 2022-03-30 NOTE — PLAN OF CARE
Problem: Pain:  Goal: Pain level will decrease  Description: Pain level will decrease  Outcome: Ongoing  Goal: Control of acute pain  Description: Control of acute pain  Outcome: Ongoing  Goal: Control of chronic pain  Description: Control of chronic pain  Outcome: Ongoing     Problem: Nutrition  Goal: Optimal nutrition therapy  3/29/2022 2234 by Omkar Valencia RN  Outcome: Ongoing     Problem: Falls - Risk of:  Goal: Will remain free from falls  Description: Will remain free from falls  Outcome: Ongoing  Goal: Absence of physical injury  Description: Absence of physical injury  Outcome: Ongoing     Omkar Valencia RN

## 2022-03-30 NOTE — PROGRESS NOTES
Physical Therapy  Facility/Department: [facility] C2 CARD TELEMETRY  Daily Treatment Note  NAME: Nelsy Solis  : 1950  MRN: [id number]    Date of Service: 3/30/2022    Discharge Recommendations:  24 hour supervision or assist        Assessment   Body structures, Functions, Activity limitations: Decreased functional mobility ; Decreased ADL status; Decreased balance;Decreased posture;Decreased strength;Decreased endurance;Decreased cognition;Decreased safe awareness  Assessment: Patient is making progress with his therapy goals. Today the patient completed supine <> sit and sit <> stand transfer with SBA. He also ambulated 15'X 2 with no AD needing SBA/ CGA. Additional ambulation was limited by the fatigue. Patient is below his prior level of function and would benefit from skilled acute PT plan of care. Physical therapy will continue to assess patient and will provide formal post-acute discharge recommendations when the patient is more medically stable and closer to discharge. Anticipate that patients mobility will significantly improve when he is connected to fewer lines/tubes. PT recommends home with 24 hr S. Treatment Diagnosis: decreased independence with functional mobility  Specific instructions for Next Treatment: progreess mobility as tolerated  Prognosis: Good  Decision Making: High Complexity  PT Education: Goals; General Safety;Gait Training;Disease Specific Education;Plan of Care; Functional Mobility Training;Precautions; Injury Prevention;Pressure Relief;PT Role;Transfer Training;Energy Conservation  Patient Education: Disease Specific Education: Patient educated on importance of OOB mobility, pursed lip breathing, prevention of complications of bedrest, and general safety during hospitalization.  Patient verbalized understanding  Barriers to Learning: none  REQUIRES PT FOLLOW UP: Yes  Activity Tolerance  Activity Tolerance: Patient Tolerated treatment well  Activity Tolerance: 151/73,  and O2 WNL on RA     Patient Diagnosis(es): The encounter diagnosis was Malignant neoplasm of esophagus, unspecified location [facility]). has a past medical history of CAD (coronary artery disease), History of tobacco use, Hyperlipidemia, Hypertension, Plantar fasciitis, and Prostate cancer ([facility] [address] has a past surgical history that includes Prostatectomy (03/2013); Coronary angioplasty with stent; Percutaneous Transluminal Coronary Angio; Cardiac surgery (2015); Penile prosthesis placement (2018); and Esophagectomy (N/A, 3/25/2022). Restrictions  Restrictions/Precautions  Restrictions/Precautions: General Precautions,Up as Tolerated,Fall Risk  Position Activity Restriction  Other position/activity restrictions: tele     Subjective   General  Chart Reviewed: Yes  Additional Pertinent Hx: ESOPHAGOGASTRODUODENOSCOPY, DANIKA BHARAT ESOPHAGECTOMY WITH OPEN THORACOTOMY, BOTOX TO PYLORUS, INTERCOSTAL NERVE BLOCK WITH CRYOABLATION  on 3/25/22  Response To Previous Treatment: Patient with no complaints from previous session. Family / Caregiver Present: No  Referring Practitioner: Julien Montes MD  Subjective  Subjective: Patient agreed to participate. General Comment  Comments: in bed on arrival  Pain Screening  Patient Currently in Pain: Yes  Pain Assessment  Pain Assessment: 0-10  Pain Level: 2  Pain Type: Acute pain  Pain Location: Abdomen; Chest  Non-Pharmaceutical Pain Intervention(s): Emotional support  Vital Signs  Patient Currently in Pain: Yes          Objective   Bed mobility  Supine to Sit: Stand by assistance  Sit to Supine: Stand by assistance  Transfers  Sit to Stand: Stand by assistance  Stand to sit: Stand by assistance  Ambulation  Ambulation?: Yes  Ambulation 1  Surface: level tile  Device: No Device  Assistance: Contact guard assistance;Stand by assistance  Gait Deviations: Slow Brenda;Decreased step length;Decreased step height  Distance: 15' X 2 to commode and back, has loose BM  Comments: too tired to walk further at this time        Exercises  Quad Sets: X 15 B supine  Heelslides: X 15 bLE supine  Gluteal Sets: X 15 B supine  Hip Abduction: X 15 B supine  Ankle Pumps: X 15 B supine        AM-PAC Score  AM-PAC Inpatient Mobility Raw Score : 17 (03/30/22 1438)  AM-PAC Inpatient T-Scale Score : 42.13 (03/30/22 1438)  Mobility Inpatient CMS 0-100% Score: 50.57 (03/30/22 1438)  Mobility Inpatient CMS G-Code Modifier : CK (03/30/22 1438)          Goals  Short term goals  Time Frame for Short term goals: 1 week 4/2/22  Short term goal 1: Supine <> sit with minimum assistance; 3/30 met  Short term goal 2: Sit <> stand and bed <> chair with minimum assistance and least restrictive assistive device. 3/30 met  Short term goal 3: Ambulate 50 feet with minimum assistance and least restrictive assistive device. 3/30 progressing  Short term goal 4: Ascend 1 step with rail and minimum assistance. 3/30 not attempted  Short term goal 5: By 3/29/22 Patient will tolerate 12-15 reps of his exercises to maximize his strength/endurance 3/27 goal met. Patient Goals   Patient goals : To improve his pain. To go home.     Plan    Plan  Times per week: 5-7/week  Plan weeks: 1 week 4/2/22  Specific instructions for Next Treatment: progreess mobility as tolerated  Current Treatment Recommendations: Strengthening,Safety Education & Training,Home Exercise Program,Patient/Caregiver Education & Training,Balance Training,Equipment Evaluation, Education, & procurement,Functional Mobility Training,Transfer Training,Gait Training,ADL/Self-care Training,Stair training,Positioning  Safety Devices  Type of devices: Call light within reach,Left in bed,Nurse notified     Therapy Time   Individual Concurrent Group Co-treatment   Time In [address]         Time Out [facility], [facility]

## 2022-03-30 NOTE — PROGRESS NOTES
Cardiac, Vascular and Thoracic Surgeons  Progress Note    3/30/2022 10:59 AM  Surgeon: Chuck Choudhury    POD#5   S/P : [place] esophagectomy    Post op follow-up    Subjective:   No major complaint or event. Up in chair this morning in NAD. Reports feeling better today than yesterday. Hospital course:  3/26 sinus tachycardia blood pressure was controlled weaned off  3/27 no major complaint or event overnight pain is well controlled diuresing well  3/28 AF/RVR overnight, amio gtt converted him back to SR 80's  3/29 Esophagram yesterday without extravasation of contrast. NGT removed as well as one of his chest tubes. 3/30 Up in chair, appears to be tolerating clear liquids well. Vital Signs: /73   Pulse 108   Temp 97.6 °F (36.4 °C)   Resp 18   Ht 6' (1.829 m)   Wt 226 lb 10.1 oz (102.8 kg)   SpO2 99%   BMI 30.74 kg/m²  O2 Flow Rate (L/min): 1 L/min     I/O:      Intake/Output Summary (Last 24 hours) at 3/30/2022 1059  Last data filed at 3/30/2022 0600  Gross per 24 hour   Intake 820 ml   Output 1080 ml   Net -260 ml       Exam:   Cardiovascular: S1 plus S2 sinus rhythm     Pulmonary: Bilaterally clear no additional sound, chest x-ray still with some bilateral atelectasis. Abdomen: Soft, non-distended, non-tender, + BM     Urine output good 1935 ml in 24 hrs; Cr 0.9    CT: Chest tube #1 with 135 ml serosanguineous drainage in 24 hrs; no air leak. Chest tube #2 removed 3/28.            Labs:   CBC:   Recent Labs     03/28/22  0425 03/29/22  0838 03/30/22  0416   WBC 10.3 8.2 7.3   HGB 12.5* 12.1* 12.0*   HCT 37.0* 35.7* 34.9*   MCV 89.8 89.0 89.1    210 209     BMP:   Recent Labs     03/28/22  0425 03/29/22  0838 03/30/22  0416   * 133* 131*   K 4.9 4.1 4.2   CL 96* 96* 94*   CO2 23 24 25   BUN 26* 27* 25*   CREATININE 0.8 0.9 0.9     Scheduled Meds:    famotidine  20 mg Oral Daily    furosemide  40 mg Oral Daily    amiodarone  400 mg Oral BID    Followed by   Pattie England ON

## 2022-03-30 NOTE — CONSULTS
Vascular Surgery Consultation    Date of Admission:  3/25/2022  5:44 AM  Date of Consultation:  3/30/2022    PCP:  Preston Oconnor MD       Reason for Consult: DVT    History of Present Illness: We are asked to see this patient in consultation by Dr. Bob Barreto  Regarding a left upper extremity DVT. Christel Rosa is a 80 y.o. male who is 5 days S/P [place] Jose esophagectomy for Esophageal CA. Patient had an IV in the left arm at antecubital and had an apparent infiltration. The left upper arm has become swollen with some pain. IV was removed and a Venous duplex was obtained today showing superficial cephalic vein thrombosis and deep brachial vein thrombosis, there was no extension into the axillary or Subclavian vein. He has been started on BID wt based Lovenox. Plans for discharge tomorrow. Past Medical History:  Past Medical History:   Diagnosis Date    CAD (coronary artery disease)     S/P NSTEMI with SURINDER LCx 4/14/15    History of tobacco use     Quit smoking.  Hyperlipidemia     on statin, LDL     Hypertension     controlled    Plantar fasciitis     Prostate cancer [facility])        Past Surgical History:  Past Surgical History:   Procedure Laterality Date    CARDIAC SURGERY  2015    coronary stent x1    CORONARY ANGIOPLASTY WITH STENT PLACEMENT      ESOPHAGECTOMY N/A 3/25/2022    ESOPHAGOGASTRODUODENOSCOPY, DANIKA JOSE ESOPHAGECTOMY WITH OPEN THORACOTOMY, BOTOX TO PYLORUS, INTERCOSTAL NERVE BLOCK WITH CRYOABLATION performed by Warren Parmar MD at [address] [place] [address] Dr LY  2018   [facility]  03/2013    PTCA         Home Medications:   Prior to Admission medications    Medication Sig Start Date End Date Taking?  Authorizing Provider   Coenzyme Q10 (CO Q 10) 10 MG CAPS Take 1 capsule by mouth daily    Historical Provider, MD   metoprolol tartrate (LOPRESSOR) 25 MG tablet Take 50 mg in AM and 25mg at night 9/23/20   Marion Boggs MD   Nutritional Supplements (VITAMIN D BOOSTER PO) Take by mouth daily     Historical Provider, MD   amLODIPine (NORVASC) 10 MG tablet Take 1 tablet by mouth daily 19   Loly Little MD   omeprazole (PRILOSEC) 20 MG delayed release capsule Take 20 mg by mouth daily    Historical Provider, MD   Multiple Vitamins-Minerals (MULTIVITAMIN ADULT PO) Take by mouth daily     Historical Provider, MD   atorvastatin (LIPITOR) 40 MG tablet Take 40 mg by mouth daily     Historical Provider, MD   aspirin 81 MG tablet Take 81 mg by mouth daily    Historical Provider, MD        Facility Administered Medications:    famotidine  20 mg Oral Daily    furosemide  40 mg Oral Daily    enoxaparin  1 mg/kg SubCUTAneous BID    amiodarone  400 mg Oral BID    Followed by   Francisco J Hicks ON 2022] amiodarone  400 mg Oral Daily    metoprolol tartrate  50 mg Oral QAM    metoprolol tartrate  25 mg Oral Nightly    mupirocin   Nasal BID    sodium chloride flush  5-40 mL IntraVENous 2 times per day    bacitracin   Topical Daily    albuterol  2.5 mg Nebulization 4x daily       Allergies:  Patient has no known allergies.      Social History:      Social History     Socioeconomic History    Marital status:      Spouse name: Not on file    Number of children: Not on file    Years of education: Not on file    Highest education level: Not on file   Occupational History    Not on file   Tobacco Use    Smoking status: Former Smoker     Quit date: 1994     Years since quittin.2    Smokeless tobacco: Never Used   Vaping Use    Vaping Use: Never used   Substance and Sexual Activity    Alcohol use: Yes     Comment: occasional    Drug use: No    Sexual activity: Not on file   Other Topics Concern    Not on file   Social History Narrative    Not on file     Social Determinants of Health     Financial Resource Strain:     Difficulty of Paying Living Expenses: Not on file   Food Insecurity:     Worried About [address] in the Last Year: Not on file    Ran Out of Food in the Last Year: Not on file   Transportation Needs:     Lack of Transportation (Medical): Not on file    Lack of Transportation (Non-Medical): Not on file   Physical Activity:     Days of Exercise per Week: Not on file    Minutes of Exercise per Session: Not on file   Stress:     Feeling of Stress : Not on file   Social Connections:     Frequency of Communication with Friends and Family: Not on file    Frequency of Social Gatherings with Friends and Family: Not on file    Attends [employer] Services: Not on file    Active Member of [address] [place] [address] [employer] or Organizations: Not on file    Attends Club or Organization Meetings: Not on file    Marital Status: Not on file   Intimate Partner Violence:     Fear of Current or Ex-Partner: Not on file    Emotionally Abused: Not on file    Physically Abused: Not on file    Sexually Abused: Not on file   Housing Stability:     Unable to Pay for Housing in the Last Year: Not on file    Number of [place] in the Last Year: Not on file    Unstable Housing in the Last Year: Not on file       Family History:        Problem Relation Age of Onset    Heart Disease Father     Cancer Sister     Cancer Brother     Cancer Maternal Uncle        Review of Systems:  A 14 point review of systems was completed. Pertinent positives identified in the HPI, all other review of systems negative. Physical Examination:    BP (!) 151/73   Pulse 103   Temp 97.6 °F (36.4 °C)   Resp 18   Ht 6' (1.829 m)   Wt 226 lb 10.1 oz (102.8 kg)   SpO2 99%   BMI 30.74 kg/m²        Admission Weight: 230 lb (104.3 kg)       General appearance: NAD  Eyes: PERRLA  Neck: no JVD, no lymphadenopathy. Respiratory: effort is unlabored  Cardiovascular: regular, no murmur. Pulses:    radial   RIGHT 2   LEFT 2     Musculoskeletal: strength and tone normal.  Extremities: Left upper arm with moderate swelling. Erythema at antecubital region.   Forearm normal with good motor and sensory function. Neuro/psychiatric: grossly intact. MEDICAL DECISION MAKING/TESTING      Venous Duplex:            Summary        There is acute partially occluding deep venous thrombosis involving the left    brachial vein from the proximal upper arm to the antecubital fossa.    There is acute totally occluding superficial venous thrombosis involving the    left cephalic vein from the mid lower arm to the proximal upper arm.    There is no evidence of deep venous thrombosis involving the right    subclavian vein.        Signature        ------------------------------------------------------------------    Electronically signed by Antionette Peacock MD (Interpreting    [id number]) on 03/30/2022 at 03:48 PM           Labs:   CBC:   Recent Labs     03/28/22  0425 03/29/22  0838 03/30/22  0416   WBC 10.3 8.2 7.3   HGB 12.5* 12.1* 12.0*   HCT 37.0* 35.7* 34.9*   MCV 89.8 89.0 89.1    210 209     BMP:   Recent Labs     03/28/22  0425 03/29/22  0838 03/30/22  0416   * 133* 131*   K 4.9 4.1 4.2   CL 96* 96* 94*   CO2 23 24 25   BUN 26* 27* 25*   CREATININE 0.8 0.9 0.9   CALCIUM 8.7 8.7 8.7   MG 2.40  --   --      Cardiac Enzymes: No results for input(s): CKTOTAL, CKMB, CKMBINDEX, TROPONINI in the last 72 hours. PT/INR: No results for input(s): PROTIME, INR in the last 72 hours. APTT: No results for input(s): APTT in the last 72 hours.   Liver Profile:  Lab Results   Component Value Date    AST 17 08/21/2018    ALT 25 08/21/2018    BILITOT 0.6 08/21/2018    ALKPHOS 71 08/21/2018     Lab Results   Component Value Date    CHOL 112 08/21/2018    CHOL 83 08/21/2018    HDL 29 08/21/2018    TRIG 80 08/21/2018     TSH:  No results found for: TSH  UA:   Lab Results   Component Value Date    COLORU Yellow 03/21/2022    PHUR 8.0 03/21/2022    CLARITYU Clear 03/21/2022    SPECGRAV 1.015 03/21/2022    LEUKOCYTESUR Negative 03/21/2022    UROBILINOGEN 0.2 03/21/2022    BILIRUBINUR Negative 03/21/2022 BLOODU Negative 03/21/2022    GLUCOSEU Negative 03/21/2022         Assessment/Recommendations:      Acute Deep and Superficial venous thrombosis in the left upper extremity. Deep vein thrombosis is only partially occluding. I suspect swelling has more to do with IV infiltration than thrombosis. Nevertheless I would recommend continued anticoagulation- can switch to oral med of choice. Anticoagulation to be continued ~3 months with repeat duplex at that time. If deep vein thrombus resolved at that time may stop. Arm elevation and warm compress should help with swelling and discomfort.

## 2022-03-31 VITALS
DIASTOLIC BLOOD PRESSURE: 75 MMHG | WEIGHT: 225.3 LBS | HEART RATE: 96 BPM | SYSTOLIC BLOOD PRESSURE: 128 MMHG | RESPIRATION RATE: 16 BRPM | TEMPERATURE: 98.5 F | BODY MASS INDEX: 30.51 KG/M2 | OXYGEN SATURATION: 97 % | HEIGHT: 72 IN

## 2022-03-31 DIAGNOSIS — I82.622 ACUTE DEEP VEIN THROMBOSIS (DVT) OF BRACHIAL VEIN OF LEFT UPPER EXTREMITY (HCC): Primary | ICD-10-CM

## 2022-03-31 LAB
ANION GAP SERPL CALCULATED.3IONS-SCNC: 14 MMOL/L (ref 3–16)
BASOPHILS ABSOLUTE: 0 K/UL (ref 0–0.2)
BASOPHILS RELATIVE PERCENT: 0.2 %
BUN BLDV-MCNC: 24 MG/DL (ref 7–20)
CALCIUM SERPL-MCNC: 8.8 MG/DL (ref 8.3–10.6)
CHLORIDE BLD-SCNC: 98 MMOL/L (ref 99–110)
CO2: 23 MMOL/L (ref 21–32)
CREAT SERPL-MCNC: 0.9 MG/DL (ref 0.8–1.3)
EOSINOPHILS ABSOLUTE: 0.2 K/UL (ref 0–0.6)
EOSINOPHILS RELATIVE PERCENT: 2.1 %
GFR AFRICAN AMERICAN: >60
GFR NON-AFRICAN AMERICAN: >60
GLUCOSE BLD-MCNC: 100 MG/DL (ref 70–99)
HCT VFR BLD CALC: 34.5 % (ref 40.5–52.5)
HEMOGLOBIN: 11.8 G/DL (ref 13.5–17.5)
LYMPHOCYTES ABSOLUTE: 0.8 K/UL (ref 1–5.1)
LYMPHOCYTES RELATIVE PERCENT: 10.1 %
MCH RBC QN AUTO: 30.3 PG (ref 26–34)
MCHC RBC AUTO-ENTMCNC: 34.1 G/DL (ref 31–36)
MCV RBC AUTO: 88.7 FL (ref 80–100)
MONOCYTES ABSOLUTE: 1.2 K/UL (ref 0–1.3)
MONOCYTES RELATIVE PERCENT: 15 %
NEUTROPHILS ABSOLUTE: 5.6 K/UL (ref 1.7–7.7)
NEUTROPHILS RELATIVE PERCENT: 72.6 %
PDW BLD-RTO: 12.8 % (ref 12.4–15.4)
PLATELET # BLD: 270 K/UL (ref 135–450)
PMV BLD AUTO: 6.2 FL (ref 5–10.5)
POTASSIUM REFLEX MAGNESIUM: 4 MMOL/L (ref 3.5–5.1)
RBC # BLD: 3.89 M/UL (ref 4.2–5.9)
SODIUM BLD-SCNC: 135 MMOL/L (ref 136–145)
WBC # BLD: 7.7 K/UL (ref 4–11)

## 2022-03-31 PROCEDURE — 2580000003 HC RX 258: Performed by: THORACIC SURGERY (CARDIOTHORACIC VASCULAR SURGERY)

## 2022-03-31 PROCEDURE — 80048 BASIC METABOLIC PNL TOTAL CA: CPT

## 2022-03-31 PROCEDURE — 6370000000 HC RX 637 (ALT 250 FOR IP): Performed by: THORACIC SURGERY (CARDIOTHORACIC VASCULAR SURGERY)

## 2022-03-31 PROCEDURE — 94640 AIRWAY INHALATION TREATMENT: CPT

## 2022-03-31 PROCEDURE — 94669 MECHANICAL CHEST WALL OSCILL: CPT

## 2022-03-31 PROCEDURE — 97535 SELF CARE MNGMENT TRAINING: CPT

## 2022-03-31 PROCEDURE — 6360000002 HC RX W HCPCS: Performed by: THORACIC SURGERY (CARDIOTHORACIC VASCULAR SURGERY)

## 2022-03-31 PROCEDURE — 85025 COMPLETE CBC W/AUTO DIFF WBC: CPT

## 2022-03-31 PROCEDURE — 97530 THERAPEUTIC ACTIVITIES: CPT

## 2022-03-31 PROCEDURE — 99024 POSTOP FOLLOW-UP VISIT: CPT | Performed by: NURSE PRACTITIONER

## 2022-03-31 PROCEDURE — 6370000000 HC RX 637 (ALT 250 FOR IP): Performed by: NURSE PRACTITIONER

## 2022-03-31 PROCEDURE — 36415 COLL VENOUS BLD VENIPUNCTURE: CPT

## 2022-03-31 RX ORDER — AMIODARONE HYDROCHLORIDE 200 MG/1
200 TABLET ORAL DAILY
Status: DISCONTINUED | OUTPATIENT
Start: 2022-04-08 | End: 2022-03-31 | Stop reason: HOSPADM

## 2022-03-31 RX ORDER — FAMOTIDINE 20 MG/1
20 TABLET, FILM COATED ORAL DAILY
Qty: 30 TABLET | Refills: 0 | Status: SHIPPED | OUTPATIENT
Start: 2022-04-01 | End: 2022-04-26 | Stop reason: ALTCHOICE

## 2022-03-31 RX ORDER — OXYCODONE HCL 5 MG/5 ML
5 SOLUTION, ORAL ORAL EVERY 6 HOURS PRN
Qty: 140 ML | Refills: 0 | Status: SHIPPED | OUTPATIENT
Start: 2022-03-31 | End: 2022-04-07

## 2022-03-31 RX ORDER — AMIODARONE HYDROCHLORIDE 400 MG/1
400 TABLET ORAL DAILY
Qty: 7 TABLET | Refills: 0 | Status: SHIPPED | OUTPATIENT
Start: 2022-04-01 | End: 2022-04-26 | Stop reason: ALTCHOICE

## 2022-03-31 RX ORDER — AMIODARONE HYDROCHLORIDE 200 MG/1
200 TABLET ORAL DAILY
Qty: 14 TABLET | Refills: 0 | Status: SHIPPED | OUTPATIENT
Start: 2022-04-08 | End: 2022-04-26 | Stop reason: ALTCHOICE

## 2022-03-31 RX ORDER — FUROSEMIDE 40 MG/1
40 TABLET ORAL DAILY
Qty: 7 TABLET | Refills: 0 | Status: SHIPPED | OUTPATIENT
Start: 2022-04-01 | End: 2022-04-26 | Stop reason: ALTCHOICE

## 2022-03-31 RX ADMIN — AMIODARONE HYDROCHLORIDE 400 MG: 200 TABLET ORAL at 08:10

## 2022-03-31 RX ADMIN — METOPROLOL TARTRATE 50 MG: 50 TABLET ORAL at 08:11

## 2022-03-31 RX ADMIN — SODIUM CHLORIDE, PRESERVATIVE FREE 10 ML: 5 INJECTION INTRAVENOUS at 08:16

## 2022-03-31 RX ADMIN — FAMOTIDINE 20 MG: 20 TABLET, FILM COATED ORAL at 08:10

## 2022-03-31 RX ADMIN — ALBUTEROL SULFATE 2.5 MG: 2.5 SOLUTION RESPIRATORY (INHALATION) at 08:46

## 2022-03-31 RX ADMIN — RIVAROXABAN 15 MG: 15 TABLET, FILM COATED ORAL at 08:10

## 2022-03-31 RX ADMIN — BACITRACIN: 500 OINTMENT TOPICAL at 08:16

## 2022-03-31 RX ADMIN — FUROSEMIDE 40 MG: 40 TABLET ORAL at 08:10

## 2022-03-31 RX ADMIN — OXYCODONE HYDROCHLORIDE 5 MG: 5 SOLUTION ORAL at 08:10

## 2022-03-31 RX ADMIN — MUPIROCIN: 20 OINTMENT TOPICAL at 08:11

## 2022-03-31 ASSESSMENT — PAIN SCALES - GENERAL
PAINLEVEL_OUTOF10: 4
PAINLEVEL_OUTOF10: 0
PAINLEVEL_OUTOF10: 0
PAINLEVEL_OUTOF10: 4
PAINLEVEL_OUTOF10: 0

## 2022-03-31 NOTE — CARE COORDINATION
[facility]    Received referral for homecare services. Will follow patient for homecare services at discharge. Should Pt DC over weekend, fax face sheet, order, [facility], and H&P to [facility].    Electronically signed by Brad Medellin LPN on 6/39/36 at 78:61 AM EDT          Formerly [facility] Transition Nurse  [phone]

## 2022-03-31 NOTE — PROGRESS NOTES
Occupational Therapy  Facility/Department: [facility] C2 CARD TELEMETRY  Daily Treatment Note  NAME: Ana Hines  : 1950  MRN: [id number]    Date of Service: 3/31/2022    Discharge Recommendations:  24 hour supervision or assist       Assessment   Performance deficits / Impairments: Decreased functional mobility ; Decreased endurance;Decreased ADL status; Decreased balance;Decreased strength;Decreased posture  Assessment: Pt met all goals this date. Pt is grossly supv for UB/ADLs and functional mobility/transfers. Recommend 24H/supv at discharge pending vitals remain stable. Plan to cont OT POC. OT Education: OT Role;Plan of Care;Precautions; Energy Conservation  Patient Education: Disease specific: POC, Tranfer trainng,, energy conservation, importance of OOB activity. Pt verbalized understanding  Barriers to Learning: none perceived  REQUIRES OT FOLLOW UP: Yes  Activity Tolerance  Activity Tolerance: Patient Tolerated treatment well  Activity Tolerance: /67, HR 87, O2 100%  Safety Devices  Safety Devices in place: Yes  Type of devices: Call light within reach; Left in chair;Nurse notified;Gait belt         Patient Diagnosis(es): The primary encounter diagnosis was Acute post-operative pain. A diagnosis of Malignant neoplasm of esophagus, unspecified location [facility]) was also pertinent to this visit. has a past medical history of CAD (coronary artery disease), History of tobacco use, Hyperlipidemia, Hypertension, Plantar fasciitis, and Prostate cancer ([facility] [address] has a past surgical history that includes Prostatectomy (2013); Coronary angioplasty with stent; Percutaneous Transluminal Coronary Angio; Cardiac surgery (); Penile prosthesis placement (); and Esophagectomy (N/A, 3/25/2022).     Restrictions  Restrictions/Precautions  Restrictions/Precautions: General Precautions,Up as Tolerated,Fall Risk  Position Activity Restriction  Other position/activity restrictions: tele  Subjective General  Chart Reviewed: Binta Grow and Physical  Patient assessed for rehabilitation services?: Yes  Response to previous treatment: Patient with no complaints from previous session  Family / Caregiver Present: Yes (wife)  Referring Practitioner: Dioni Nascimento MD 3/25/22  Diagnosis: ESOPHAGEAL CANCER; s/p [place] [employer] esophagectomy 3/25/22  Subjective  Subjective: Pt seated in chair upon OT arrival and agreeable to OT session. Wife arrived midway through session. RN reported patient is cleared for discharge later this date. General Comment  Comments: RN cleared pt for therapy  Vital Signs  Patient Currently in Pain: No   Orientation  Orientation  Overall Orientation Status: Within Functional Limits  Objective    ADL  Grooming: Supervision  UE Dressing: Supervision  LE Dressing: Supervision  Toileting:  (declined)  Additional Comments: Pt noted to have slight LOB when reaching towards R however able to recover IND. No reports of dizziness or fatigue        Balance  Sitting Balance: Independent  Standing Balance: Independent  Standing Balance  Time: 10 minutes  Activity: UB/LB dressing; room mobility  Functional Mobility  Functional - Mobility Device: 4-Wheeled Walker  Activity: Other (within room)  Assist Level: Supervision  Functional Mobility Comments: Pt with slight LOB to right during functional mobility however able to self correct. Bed mobility  Supine to Sit:  (unable to assess)  Sit to Supine:  (unable to assess)  Comment: Pt in chair upon entry and returned to chair at end of session  Transfers  Stand Pivot Transfers: Supervision  Sit to stand: Independent  Stand to sit:  Independent      Cognition  Overall Cognitive Status: WFL        Type of ROM/Therapeutic Exercise  Type of ROM/Therapeutic Exercise: AROM  Comment: Troy reviewed UE HEP per patient request  Exercises  Elbow Flexion: x 10 reps  Elbow Extension: x 10 reps  Supination: x 10 reps  Pronation: x 10 reps  Wrist Flexion: x 10 reps  Wrist Extension: x 10 reps  Finger Flexion: x 10 reps  Finger Extension: x 10 reps      Plan   Plan  Times per week: 4-6x's a week while in ICU  Current Treatment Recommendations: Strengthening,Balance Training,Functional Mobility Training,Safety Education & Training,Positioning,Self-Care / ADL,Endurance Training,Patient/Caregiver Education & Training    AM-PAC Score  AM-PAC Inpatient Daily Activity Raw Score: 23 (03/31/22 1142)  AM-PAC Inpatient ADL T-Scale Score : 51.12 (03/31/22 1142)  ADL Inpatient CMS 0-100% Score: 15.86 (03/31/22 1142)  ADL Inpatient CMS G-Code Modifier : CI (03/31/22 1142)    Goals  Short term goals  Time Frame for Short term goals: 2 weeks 4/09  Short term goal 1: Pt will complete toilet transfers with SBA by 4/01; met 3/31  Short term goal 2: Pt will complete LE dressing with supv- met 3/31  Short term goal 3: Pt will complete standing level ADLs with SBA for balance- met 3/31  Patient Goals   Patient goals : \"to return home\"       Therapy Time   Individual Concurrent Group Co-treatment   Time In 1026         Time Out 1052         Minutes 26              If pt is unable to be seen after this session, please let this note serve as discharge summary. Please see case management note for discharge disposition. Thank you.     Sonny Sainz, OT

## 2022-03-31 NOTE — CARE COORDINATION
[facility]    Spoke with patient's spouse regarding [facility] services. Spouse aware and agreeable to services. Stated \"we really dont want OT and will refuse that service. I dont know about PT either\". Spouse instructed to let SN and PT perform initial visit for services and frequency of visits will need to be discussed betwenn them and homecare personal. Demographics verified. Orders sent via direct link to [facility]. Electronically signed by Olivia Oseguera LPN on 6/71/67 at 38:76 AM [facility] Transition Nurse  [phone]

## 2022-03-31 NOTE — CARE COORDINATION
CASE MANAGEMENT DISCHARGE SUMMARY      Discharge to: Home with [facility] in [address] [place] [address] completed: [facility] Exemption Notification (HENS) completed: na    IMM given: (date) Today  New Durable Medical Equipment ordered/agency: n/a    Transportation:    Family/car: Wife here to transport      Confirmed discharge plan with:MD/ DEZ/ RN     Patient: yes     Family:  Yes   Name: Denisha Epperson Contact number:[phone]     Facility/Agency, name:  [facility] faxed [facility] has received all orders via EPIC     RN, name: Blas José

## 2022-03-31 NOTE — PROGRESS NOTES
Patient VSS, seen by vascular - continuing anticoagulation at home. DC'd per orders. DC education provided, patient verbalized understanding. Meds picked up at outpatient pharmacy. Patient transported via wheelchair with all personal belongings.  Will follow up with cardiac team on 4/5

## 2022-03-31 NOTE — DISCHARGE INSTR - COC
Continuity of Care Form    Patient Name: Dayo Rangel   :  1950  MRN:  [id number]    Admit date:  3/25/2022  Discharge date:  3/31/22    Code Status Order: Full Code   Advance Directives:   [facility] Documentation       Date/Time Healthcare Directive Type of Healthcare Directive Copy in [facility] [address] Agent's Name Healthcare Agent's Phone Number    [phone] No, patient does not have an advance directive for healthcare treatment -- -- -- -- --            Admitting Physician:  Radha Russell MD  PCP: Uriah Guo MD    Discharging Nurse: [facility] Unit/Room#: 0221/0221-01  Discharging Unit Phone Number:     Emergency Contact:   Extended Emergency Contact Information  Primary Emergency Contact: Jacque Anderson Phone: [phone]  Mobile Phone: [phone]  Relation: Spouse    Past Surgical History:  Past Surgical History:   Procedure Laterality Date    CARDIAC SURGERY  2015    coronary stent x1    CORONARY ANGIOPLASTY WITH STENT PLACEMENT      ESOPHAGECTOMY N/A 3/25/2022    ESOPHAGOGASTRODUODENOSCOPY, DANIKA BHARAT ESOPHAGECTOMY WITH OPEN THORACOTOMY, BOTOX TO PYLORUS, INTERCOSTAL NERVE BLOCK WITH CRYOABLATION performed by Radha Russell MD at [address] [place] [address]  2018    PROSTATECTOMY  2013    PTCA         Immunization History: There is no immunization history on file for this patient.     Active Problems:  Patient Active Problem List   Diagnosis Code    NSTEMI (non-ST elevation myocardial infarction) ([facility]) I21.4    HTN (hypertension) I10    CAD (coronary artery disease) I25.10    HLD (hyperlipidemia) E78.5    Esophageal cancer (HCC) C15.9       Isolation/Infection:   Isolation            No Isolation          Patient Infection Status       Infection Onset Added Last Indicated Last Indicated By Review Planned Expiration Resolved Resolved By    None active    Resolved    COVID-19 (Rule Out) 22 03/21/22 COVID-19 (Ordered)   03/22/22 Rule-Out Test Resulted            Nurse Assessment:  Last Vital Signs: /75   Pulse 96   Temp 98.5 °F (36.9 °C) (Oral)   Resp 16   Ht 6' (1.829 m)   Wt 225 lb 4.8 oz (102.2 kg)   SpO2 97%   BMI 30.56 kg/m²     Last documented pain score (0-10 scale): Pain Level: 4  Last Weight:   Wt Readings from Last 1 Encounters:   03/31/22 225 lb 4.8 oz (102.2 kg)     Mental Status:  oriented    IV Access:  - None    Nursing Mobility/ADLs:  Walking   Independent  Transfer  Independent  Bathing  Independent  Dressing  Independent  Toileting  Independent  Feeding  Independent  Med Admin  Independent  Med Delivery   whole    Wound Care Documentation and Therapy:        Elimination:  Continence: Bowel: No  Bladder: No  Urinary Catheter: None   Colostomy/Ileostomy/Ileal Conduit: No       Date of Last BM:     Intake/Output Summary (Last 24 hours) at 3/31/2022 1037  Last data filed at 3/31/2022 0700  Gross per 24 hour   Intake 240 ml   Output 430 ml   Net -190 ml     I/O last 3 completed shifts: In: 5 [P.O.:720]  Out: 1190 [Urine:1125; Chest Tube:65]    Safety Concerns: At Risk for Falls    Impairments/Disabilities:      None    Nutrition Therapy:  Current Nutrition Therapy:   - Oral Diet:  Full Liquid    Routes of Feeding: Oral  Liquids: Thin Liquids  Daily Fluid Restriction: no  Last Modified Barium Swallow with Video (Video Swallowing Test): not done    Treatments at the Time of Hospital Discharge:   Respiratory Treatments:   Oxygen Therapy:  is not on home oxygen therapy. Ventilator:    - No ventilator support    Rehab Therapies: SN PT OT  Weight Bearing Status/Restrictions: No weight bearing restrictions  Other Medical Equipment (for information only, NOT a DME order):     Other Treatments:     Patient's personal belongings (please select all that are sent with patient):  Perico    RN SIGNATURE:  Electronically signed by Bianca Wayne RN on 3/31/22 at 11:53 AM EDT    CASE MANAGEMENT/SOCIAL WORK SECTION    Inpatient Status Date:     Readmission Risk Assessment Score:  Readmission Risk              Risk of Unplanned Readmission:  15         [facility]   [facility]    Address:   Phone: [phone]  Fax: [phone]      / signature: {Esignature:[id number]}    PHYSICIAN SECTION    Prognosis: Good    Condition at Discharge: Stable    Rehab Potential (if transferring to Rehab): {Prognosis:[id number]}    Recommended Labs or Other Treatments After Discharge: CBC, CMP, CXR prior to his follow up visit on 4/05/22. Physician Certification: I certify the above information and transfer of Gregorio Barksdale  is necessary for the continuing treatment of the diagnosis listed and that he requires [address] for less 30 days.      Update Admission H&P: No change in H&P    PHYSICIAN SIGNATURE:  Electronically signed by CHRISTI Polk CNP on 3/31/22 at 10:39 AM EDT

## 2022-03-31 NOTE — PROGRESS NOTES
Nutrition Note    Met with pt prior to discharge to discuss full liquid diet. Discussed foods pt able to eat on full liquid diet, handouts provided. Pt drinks Boost at home, encouraged pt to continue. Family previously making pt milkshakes and smoothies. Dietitians following with nutrition assessment and recommendations in RD progress notes. Will continue to monitor per nutrition standards of care.      Vane Herrera MS, RD, LD on 3/31/2022 at 12:12 PM  Office: [phone]

## 2022-03-31 NOTE — DISCHARGE SUMMARY
Cardiac, Vascular & Thoracic Surgery  Discharge Summary    Patient:  Nolan Sykes 1950 [id number]   Admission Date:  3/25/2022  5:44 AM  Discharge Date: 3/31/22      Principle Diagnosis:  Esophageal cancer [facility])    Secondary Diagnosis:  Principal Problem:    Esophageal cancer ([address]  Resolved Problems:    * No resolved hospital problems. *    VL Extremity venous left: 3/30/22   Summary       Galileo Caceres is acute partially occluding deep venous thrombosis involving the left    brachial vein from the proximal upper arm to the antecubital fossa.    There is acute totally occluding superficial venous thrombosis involving the    left cephalic vein from the mid lower arm to the proximal upper arm.    There is no evidence of deep venous thrombosis involving the right    subclavian vein.         Procedure: 3/25/22 Lelan Last Esophagectomy     History:  The patient is a 80 y.o. male Mr. Johnson has finished chemo and radiation and has had his follow up PET scan. Here to discuss options.  With partial good response     Also has been cleared by Dr. Lakeisha Fair for surgery.      Hospital Course: The post operative period for this patient was complicated by him going into atrial fibrillation. He was given amiodarone and converted back to sinus rhythm. He remains in SR at the time of d/c. He also had an IV infiltration in his left arm that lead to a LUE duplex, as it was swollen, red, and warm to touch. The duplex revealed a partially occluding DVT in the brachial vein. Therapeutic lovenox was started and Vascular surgeon was consulted. The pt will go home on Xarelto 15 mg BID for 20 days followed by Xarelto 20 mg daily for 70 days. Around the 3 month marker he is to have a venous duplex performed again. If it is negative he will no longer need to be on Xarelto. If it still remains, the pt would need another 3 months of Xarelto. The patient was discharged on 3/31/22 and will follow up in the office on 4/5/22.      Discharged Condition: stable    Disposition: home with Edenilson Gomez and his spouse     Discharge Medications:     Medication List      START taking these medications    * amiodarone 400 MG tablet  Commonly known as: PACERONE  Take 1 tablet by mouth daily for 7 days  Start taking on: April 1, 2022     * amiodarone 200 MG tablet  Commonly known as: CORDARONE  Take 1 tablet by mouth daily for 14 days Starting on 4/8/22. Start taking on: April 8, 2022     famotidine 20 MG tablet  Commonly known as: PEPCID  Take 1 tablet by mouth daily  Start taking on: April 1, 2022     furosemide 40 MG tablet  Commonly known as: LASIX  Take 1 tablet by mouth daily for 7 days  Start taking on: April 1, 2022     oxyCODONE 5 MG/5ML solution  Commonly known as: ROXICODONE  Take 5 mLs by mouth every 6 hours as needed (acute post op pain) for up to 7 days. * rivaroxaban 15 MG Tabs tablet  Commonly known as: XARELTO  Take 1 tablet by mouth 2 times daily (with meals) for 20 days With final dose the evening of 4/20/22. Single dosing to start the next day, 4/21/22. * rivaroxaban 20 MG Tabs tablet  Commonly known as: XARELTO  Take 1 tablet by mouth daily Starting 4/21/22  Start taking on: April 21, 2022         * This list has 4 medication(s) that are the same as other medications prescribed for you. Read the directions carefully, and ask your doctor or other care provider to review them with you.             CONTINUE taking these medications    aspirin 81 MG tablet     atorvastatin 40 MG tablet  Commonly known as: LIPITOR     metoprolol tartrate 25 MG tablet  Commonly known as: LOPRESSOR  Take 50 mg in AM and 25mg at night     VITAMIN D BOOSTER PO        STOP taking these medications    amLODIPine 10 MG tablet  Commonly known as: NORVASC     Co Q 10 10 MG Caps     MULTIVITAMIN ADULT PO     omeprazole 20 MG delayed release capsule  Commonly known as: PRILOSEC           Where to Get Your Medications      You can get these medications from any pharmacy Bring a paper prescription for each of these medications  · amiodarone 200 MG tablet  · amiodarone 400 MG tablet  · famotidine 20 MG tablet  · furosemide 40 MG tablet  · oxyCODONE 5 MG/5ML solution  · rivaroxaban 15 MG Tabs tablet  · rivaroxaban 20 MG Tabs tablet        Patient Instructions: Activity: DO NOT LIFT, PUSH, OR PULL ANYTHING OVER 5 POUNDS FOR 6 WEEK from the day of surgery  Diet:  full liquid diet  Wound Care:  [facility] YOUR INCISIONS DAILY WITH A CLEAN WASHCLOTH AND ANTIBACTERIAL SOAP. Do not wash your incisions after you have cleansed other parts of your body    Follow up with Cardiothoracic Surgeon, Dr. Joselyn Mederos on 4/5/22 at 11:30 am.  Follow up with Hematologist/Oncologist in 1-2 weeks or as directed.     Erin Crain, CHRISTI - CNP

## 2022-04-03 NOTE — OP NOTE
Operative Note      Patient: Gregorio Barksdale  YOB: 1950  MRN: [id number]    Date of Procedure: 3/25/2022    Pre-Op Diagnosis: ESOPHAGEAL CANCER    Post-Op Diagnosis: Same       Procedure(s):  ESOPHAGOGASTRODUODENOSCOPY, DANIKA BHARAT ESOPHAGECTOMY WITH OPEN THORACOTOMY, BOTOX TO PYLORUS, INTERCOSTAL NERVE BLOCK WITH CRYOABLATION    Surgeon(s):  Ramonita Cramer MD    Assistant:   Surgical Assistant: Ulysses Dailey  Physician Assistant: KB Milligan PA was present during the entire procedure for surgeons first assist due to the complexity of the procedure. Anesthesia: General    Estimated Blood Loss (mL): 381     Complications: None    Specimens:   ID Type Source Tests Collected by Time Destination   A : LEVEL 8&9 LYMPH NODES Tissue Lymph Node SURGICAL PATHOLOGY (Canceled) Ramonita Cramer MD 3/25/2022 1040    B : LEVEL 7 LYMPH NODE Tissue Lymph Node SURGICAL PATHOLOGY (Canceled) Ramonita Cramer MD 3/25/2022 1102    C : GASTROESOPHAGEAL TUMOR Tissue Tissue SURGICAL PATHOLOGY (Canceled) Ramonita Cramer MD 3/25/2022 1119    D : PARAESOPHAGEAL LYMPH NODE Tissue Lymph Node SURGICAL PATHOLOGY (Canceled) Ramonita Cramer MD 3/25/2022 1124    E : DISTAL STOMACH Tissue Stomach SURGICAL PATHOLOGY (Canceled) Ramonita Cramer MD 3/25/2022 1136    F : TRUE STOMACH ANASTAMOSIS Tissue Stomach SURGICAL PATHOLOGY (Canceled) Ramonita Cramer MD 3/25/2022 1144    G : TRUE ESOPHAGUS ANASTAMOSIS Respiratory Esophagus SURGICAL PATHOLOGY (Canceled) Ramonita Cramer MD 3/25/2022 1146        Findings: No metastatic disease could be visualized. Detailed Description of Procedure:    Pt Was taken to the operating room after informative consent was obtained lying supine under anesthesia venous access was placed. Prepped and draped in standard fashion pause for the cause was done in standard manner abdomen was prepped timeout was performed.   5 abdominal port was placed in standard fashion positions of the hiatus was performed by using sono incision and hook Bovie gastrohepatic ligament was taking down right willow was dissected off the esophagus up to the V meeting with the left willow dissection was carried anteriorly and to the side. Short gastric artery were then divided examined by using SonoSite all the way up to the willow the behind the esophagus was performed and clarified. Right gastroepiploic artery was divided and preserved the gastro colic history was dissected with preservation of the right colic artery all the way to the pylorus . Endo CANDI stapler gold load used to transect left gastric artery after dissected  all the lymphatic tissue , tubularization of the stomach performed by using 5 X 6 cm purple load. 12 mm port was closed with 2-0 Ethibond through the fascia the rest of the 5 mm port was closed in standard fashion retractor was pulled out hemostasis was secured. Patient was turned right side up prepped and draped in standard fashion. Posterior lateral thoracotomy was performed with preservation of anterior serratus and transection latissimus dorsi the site was marked to be closed appropriately . cryoablation was used for third fourth and fifth sixth and seventh intercostal space neuroma came injected the same basis below to see each. Esophagus was mobilized by using Bovie unremarkable all the way to azygos vein,   level 8 and 9 right was removed and sent for pathology. Inferior pulmonary ligaments and paraesophageal fat and lymph node was dissected with the specimen.   Bovie was used all the way up to the hiatus was no esophagus was mobilized specimen was pulled into the chest with no difficulty we have seen clear of the stomach abdomen was performed esophagus was transected about 1 cm below the azygos vein specimen was cut sharply by the scissors from the stomach and to pathology esophageal margin was negative pursestring using 4-0 Prolene was performed to the proximal part of the esophagus size 25 EEA was placed into the esophagus the handle was passed through the opening of the stomach to create an anastomosis in appropriate position for the stomach staple was fired 2 doughnuts was seen send as final margin of anastomosis stomach was closed by using an Endo CANDI stapler that sent as gastric margin. Using a 3-0 silk muscle to muscle layer was enforced in 12 position Doppler was used to assess the flow there is good flow through the right gastric epiploicae. EGD was performed air bubble test through the anastomosis was negative scope was advanced all the way to the pylorus with no difficulty 100 international unit was injected to each quadrant for total dose of 400. Stomach was suctioned tube, EGD was pulled out, NG tube was placed assist intraoperatively to be positioned at 53 cm. Periaortic fat including the thoracic duct was tied off by using a 2-0 silk x2 . 2 Leatha Barter was placed through the 12 mm port black suture for anastomosis white suture for diaphragm.   Copious amount of irrigation was done no active bleeding was seen root was approximated in 3 position by using #5 Ethibond lung was inflated with no difficulty count were told is correct x2 dispose of side muscle was closed by using 0 Vicryl in 2 layers soft tissue and skin was closed in standard fashion patient was dispositioned to ICU in stable condition without no complication    Electronically signed by Sumaya Gurrola MD on 4/3/2022 at 7:39 PM

## 2022-04-05 ENCOUNTER — OFFICE VISIT (OUTPATIENT)
Dept: CARDIOTHORACIC SURGERY | Age: 72
End: 2022-04-05

## 2022-04-05 VITALS
HEART RATE: 77 BPM | DIASTOLIC BLOOD PRESSURE: 80 MMHG | BODY MASS INDEX: 30.1 KG/M2 | HEIGHT: 72 IN | TEMPERATURE: 97.9 F | SYSTOLIC BLOOD PRESSURE: 120 MMHG | WEIGHT: 222.2 LBS | OXYGEN SATURATION: 94 %

## 2022-04-05 DIAGNOSIS — G89.18 POST-OP PAIN: ICD-10-CM

## 2022-04-05 DIAGNOSIS — C15.5 MALIGNANT NEOPLASM OF LOWER THIRD OF ESOPHAGUS (HCC): Primary | ICD-10-CM

## 2022-04-05 PROCEDURE — 99024 POSTOP FOLLOW-UP VISIT: CPT

## 2022-04-05 RX ORDER — OXYCODONE HYDROCHLORIDE 5 MG/1
5 TABLET ORAL EVERY 6 HOURS PRN
Qty: 20 TABLET | Refills: 0 | Status: SHIPPED | OUTPATIENT
Start: 2022-04-05 | End: 2022-04-10

## 2022-04-05 NOTE — PROGRESS NOTES
Cardiac, Vascular and Thoracic Surgeons  Clinic Note     4/5/2022 12:02 PM  Surgeon:  Deidra Barreto     S/P :  1st post-op s/p EGD, Tristonmayelin Urb[place] Orange esophagectomy w/ open thoracotomy, botox to pylorus, & cryo on 3/25/22 w/ MOM. Chief complaint : 1st post-op  Subjective:  Mr. Jeffrey Goins is doing well post-operatively. Patient has DVT to LUE and he is taking xarelto for this. He is currently following a full-liquid diet. All incisions healing well, well-approximated, no redness or drainage. He does still feel some pain to right abdomen, radiates to back. Requesting pain medication refill. Not requiring pain meds during day, but is taking them before bed. Besides DVT, no significant swelling to extremities. He is sleeping sitting up. He had a small episode of SOB last night, relieved on its own. Has a CPAP, has not been wearing it. Vital Signs: /80 (Site: Left Upper Arm, Position: Sitting)   Pulse 77   Temp 97.9 °F (36.6 °C) (Temporal)   Ht 6' (1.829 m)   Wt 222 lb 3.2 oz (100.8 kg)   SpO2 94%   BMI 30.14 kg/m²      I/O:  No intake or output data in the 24 hours ending 04/05/22 1202    Exam:   Cardiovascular: Clear S1, S2. No MRG. Pulmonary: CTAB    Lower extremity: No edema  Incision: R thoracic incision CDI. Abdominal incisions CDI. Labs:   CBC: No results for input(s): WBC, HGB, HCT, MCV, PLT in the last 72 hours. BMP: No results for input(s): NA, K, CL, CO2, PHOS, BUN, CREATININE, CA in the last 72 hours. PT/INR: No results for input(s): PROTIME, INR in the last 72 hours. APTT: No results for input(s): APTT in the last 72 hours.     Scheduled Meds:     Patient Active Problem List   Diagnosis    [facility]non-ST elevation [facility] infarction) [facility]    HTN (hypertension)    CAD (coronary artery disease)    HLD (hyperlipidemia)    Esophageal cancer (HCC)       Assessment/Plan:   1. 1st post-op s/p EGD, Auroramayelin [place] Orange esophagectomy w/ open thoracotomy, botox to pylorus, & cryo on 3/25/22 w/ MOM  - Patient doing well post operatively  - May progress diet to soft mechanical   - F/u in 3 weeks, or sooner if needed    Gurvinder Jackson PA-C

## 2022-04-26 ENCOUNTER — OFFICE VISIT (OUTPATIENT)
Dept: CARDIOTHORACIC SURGERY | Age: 72
End: 2022-04-26

## 2022-04-26 VITALS
OXYGEN SATURATION: 97 % | TEMPERATURE: 97.9 F | SYSTOLIC BLOOD PRESSURE: 130 MMHG | HEIGHT: 72 IN | DIASTOLIC BLOOD PRESSURE: 78 MMHG | WEIGHT: 213 LBS | HEART RATE: 74 BPM | BODY MASS INDEX: 28.85 KG/M2

## 2022-04-26 DIAGNOSIS — C15.5 MALIGNANT NEOPLASM OF LOWER THIRD OF ESOPHAGUS (HCC): Primary | ICD-10-CM

## 2022-04-26 PROCEDURE — 99024 POSTOP FOLLOW-UP VISIT: CPT

## 2022-04-26 NOTE — PROGRESS NOTES
Cardiac, Vascular and Thoracic Surgeons  Clinic Note     4/26/2022 9:31 AM  Surgeon:  Carole Ferrer     S/P :  2nd post-op s/p EGD, [place] Hulbert esophagectomy w/ open thoracotomy, botox to pylorus, & cryo on 3/25/22 w/ MOM. Chief complaint : 2nd post-op  Subjective:  Mr. Jazz Prado is doing well post-op. He is having a slight discomfort to right abdomen, not rated on the scale. Patient reports decreased appetite, and constipation. He was taking colace, but it did not seem to work. Miralax was started Sunday, no BM yet. Patient is drinking 1 boost a day, and eating cereal, rice, oatmeal. Only eating small amounts. All incisions appear to be healing very nicely, no redness or drainage. He is now on a full diet. Vital Signs: /78 (Site: Right Upper Arm, Position: Sitting)   Pulse 74   Temp 97.9 °F (36.6 °C) (Temporal)   Ht 6' (1.829 m)   Wt 213 lb (96.6 kg)   SpO2 97%   BMI 28.89 kg/m²      I/O:  No intake or output data in the 24 hours ending 04/26/22 0931    Exam:   Cardiovascular: Clear S1, S2. No MRG. Pulmonary: CTAB    Lower extremity: No peripheral edema. Incision: R thoracic incision CDI. 6 abdominal incisions CDI. Labs:   CBC: No results for input(s): WBC, HGB, HCT, MCV, PLT in the last 72 hours. BMP: No results for input(s): NA, K, CL, CO2, PHOS, BUN, CREATININE, CA in the last 72 hours. PT/INR: No results for input(s): PROTIME, INR in the last 72 hours. APTT: No results for input(s): APTT in the last 72 hours. Scheduled Meds:     Patient Active Problem List   Diagnosis    NSTEMI (non-ST elevation myocardial infarction) ([facility] [address]    HTN (hypertension)    CAD (coronary artery disease)    HLD (hyperlipidemia)    Esophageal cancer (HCC)       Assessment/Plan:   1. 2nd post-op s/p EGD, [place] Hulbert esophagectomy w/ open thoracotomy, botox to pylorus, & cryo on 3/25/22 w/ MOM  - Patient doing very well post-operatively. Food intake could use improvement.  States he has no appetite still.   - Rec boost 3 x/day until appetite returns, and he begins gaining weight.  As well as full meals  - Refilled Xarelto for DVT LUE 20 mg QD   - Esophagram reviewed with patient from inpatient stay  - F/u PRBEST Ureña PA-C

## 2022-09-27 ENCOUNTER — HOSPITAL ENCOUNTER (OUTPATIENT)
Dept: CT IMAGING | Age: 72
Discharge: HOME | End: 2022-09-27
Payer: COMMERCIAL

## 2022-09-27 DIAGNOSIS — C15.5 MALIGNANT NEOPLASM OF ABDOMINAL ESOPHAGUS (HCC): ICD-10-CM

## 2022-09-27 LAB
GFR AFRICAN AMERICAN: >60
GFR NON-AFRICAN AMERICAN: >60
PERFORMED ON: NORMAL
POC CREATININE: 0.9 MG/DL (ref 0.8–1.3)
POC SAMPLE TYPE: NORMAL

## 2022-09-27 PROCEDURE — 6360000004 HC RX CONTRAST MEDICATION: Performed by: INTERNAL MEDICINE

## 2022-09-27 PROCEDURE — 74177 CT ABD & PELVIS W/CONTRAST: CPT

## 2022-09-27 PROCEDURE — 82565 ASSAY OF CREATININE: CPT

## 2022-09-27 RX ADMIN — IOPAMIDOL 75 ML: 755 INJECTION, SOLUTION INTRAVENOUS at 08:58

## 2022-09-27 RX ADMIN — IOPAMIDOL 50 ML: 612 INJECTION, SOLUTION INTRAVENOUS at 08:58

## 2022-09-29 NOTE — PROGRESS NOTES
This RN called the patient to follow up after IV infiltrated during a CT study. The patient states that the site was swollen the morning after but as of the time of this call, the patient said that it is \"fine\" and \"back to normal\". This RN asked the patient if he had any further questions or concerns or if there was anything I could do to help him. The patient declined all.      Thank you,    JUAN KayN, RN

## 2022-11-14 NOTE — PROGRESS NOTES
PCA pump discontinued. Amiodarone discontinued. Maintenance fluids discontinued. Doxycycline Counseling:  Patient counseled regarding possible photosensitivity and increased risk for sunburn.  Patient instructed to avoid sunlight, if possible.  When exposed to sunlight, patients should wear protective clothing, sunglasses, and sunscreen.  The patient was instructed to call the office immediately if the following severe adverse effects occur:  hearing changes, easy bruising/bleeding, severe headache, or vision changes.  The patient verbalized understanding of the proper use and possible adverse effects of doxycycline.  All of the patient's questions and concerns were addressed.

## 2023-03-31 ENCOUNTER — ANESTHESIA EVENT (OUTPATIENT)
Dept: ENDOSCOPY | Age: 73
End: 2023-03-31
Payer: COMMERCIAL

## 2023-04-03 ENCOUNTER — ANESTHESIA (OUTPATIENT)
Dept: ENDOSCOPY | Age: 73
End: 2023-04-03
Payer: COMMERCIAL

## 2023-04-03 ENCOUNTER — HOSPITAL ENCOUNTER (OUTPATIENT)
Age: 73
Setting detail: OUTPATIENT SURGERY
Discharge: HOME | End: 2023-04-03
Attending: INTERNAL MEDICINE | Admitting: INTERNAL MEDICINE
Payer: COMMERCIAL

## 2023-04-03 VITALS
WEIGHT: 190 LBS | RESPIRATION RATE: 16 BRPM | OXYGEN SATURATION: 100 % | SYSTOLIC BLOOD PRESSURE: 132 MMHG | DIASTOLIC BLOOD PRESSURE: 74 MMHG | TEMPERATURE: 96.9 F | HEIGHT: 72 IN | HEART RATE: 84 BPM | BODY MASS INDEX: 25.73 KG/M2

## 2023-04-03 DIAGNOSIS — D64.9 ANEMIA, UNSPECIFIED TYPE: ICD-10-CM

## 2023-04-03 DIAGNOSIS — Z86.010 HISTORY OF COLON POLYPS: ICD-10-CM

## 2023-04-03 PROCEDURE — 2580000003 HC RX 258: Performed by: ANESTHESIOLOGY

## 2023-04-03 PROCEDURE — 3700000000 HC ANESTHESIA ATTENDED CARE: Performed by: INTERNAL MEDICINE

## 2023-04-03 PROCEDURE — 2709999900 HC NON-CHARGEABLE SUPPLY: Performed by: INTERNAL MEDICINE

## 2023-04-03 PROCEDURE — 88305 TISSUE EXAM BY PATHOLOGIST: CPT

## 2023-04-03 PROCEDURE — 7100000011 HC PHASE II RECOVERY - ADDTL 15 MIN: Performed by: INTERNAL MEDICINE

## 2023-04-03 PROCEDURE — 7100000010 HC PHASE II RECOVERY - FIRST 15 MIN: Performed by: INTERNAL MEDICINE

## 2023-04-03 PROCEDURE — 6360000002 HC RX W HCPCS: Performed by: NURSE ANESTHETIST, CERTIFIED REGISTERED

## 2023-04-03 PROCEDURE — 3609010600 HC COLONOSCOPY POLYPECTOMY SNARE/COLD BIOPSY: Performed by: INTERNAL MEDICINE

## 2023-04-03 PROCEDURE — 2500000003 HC RX 250 WO HCPCS: Performed by: NURSE ANESTHETIST, CERTIFIED REGISTERED

## 2023-04-03 PROCEDURE — 2580000003 HC RX 258: Performed by: NURSE ANESTHETIST, CERTIFIED REGISTERED

## 2023-04-03 PROCEDURE — 3700000001 HC ADD 15 MINUTES (ANESTHESIA): Performed by: INTERNAL MEDICINE

## 2023-04-03 PROCEDURE — 3609027000 HC COLONOSCOPY: Performed by: INTERNAL MEDICINE

## 2023-04-03 RX ORDER — SODIUM CHLORIDE, SODIUM LACTATE, POTASSIUM CHLORIDE, CALCIUM CHLORIDE 600; 310; 30; 20 MG/100ML; MG/100ML; MG/100ML; MG/100ML
INJECTION, SOLUTION INTRAVENOUS CONTINUOUS
Status: DISCONTINUED | OUTPATIENT
Start: 2023-04-03 | End: 2023-04-03 | Stop reason: HOSPADM

## 2023-04-03 RX ORDER — LIDOCAINE HYDROCHLORIDE 20 MG/ML
INJECTION, SOLUTION EPIDURAL; INFILTRATION; INTRACAUDAL; PERINEURAL PRN
Status: DISCONTINUED | OUTPATIENT
Start: 2023-04-03 | End: 2023-04-03 | Stop reason: SDUPTHER

## 2023-04-03 RX ORDER — PROPOFOL 10 MG/ML
INJECTION, EMULSION INTRAVENOUS PRN
Status: DISCONTINUED | OUTPATIENT
Start: 2023-04-03 | End: 2023-04-03 | Stop reason: SDUPTHER

## 2023-04-03 RX ORDER — PROPOFOL 10 MG/ML
INJECTION, EMULSION INTRAVENOUS CONTINUOUS PRN
Status: DISCONTINUED | OUTPATIENT
Start: 2023-04-03 | End: 2023-04-03 | Stop reason: SDUPTHER

## 2023-04-03 RX ORDER — SODIUM CHLORIDE, SODIUM LACTATE, POTASSIUM CHLORIDE, CALCIUM CHLORIDE 600; 310; 30; 20 MG/100ML; MG/100ML; MG/100ML; MG/100ML
INJECTION, SOLUTION INTRAVENOUS CONTINUOUS PRN
Status: DISCONTINUED | OUTPATIENT
Start: 2023-04-03 | End: 2023-04-03 | Stop reason: SDUPTHER

## 2023-04-03 RX ADMIN — PROPOFOL 100 MG: 10 INJECTION, EMULSION INTRAVENOUS at 13:33

## 2023-04-03 RX ADMIN — SODIUM CHLORIDE, POTASSIUM CHLORIDE, SODIUM LACTATE AND CALCIUM CHLORIDE: 600; 310; 30; 20 INJECTION, SOLUTION INTRAVENOUS at 12:44

## 2023-04-03 RX ADMIN — SODIUM CHLORIDE, SODIUM LACTATE, POTASSIUM CHLORIDE, AND CALCIUM CHLORIDE: .6; .31; .03; .02 INJECTION, SOLUTION INTRAVENOUS at 13:29

## 2023-04-03 RX ADMIN — PROPOFOL 150 MCG/KG/MIN: 10 INJECTION, EMULSION INTRAVENOUS at 13:33

## 2023-04-03 RX ADMIN — LIDOCAINE HYDROCHLORIDE 100 MG: 20 INJECTION, SOLUTION EPIDURAL; INFILTRATION; INTRACAUDAL; PERINEURAL at 13:33

## 2023-04-03 ASSESSMENT — PAIN SCALES - GENERAL
PAINLEVEL_OUTOF10: 0

## 2023-04-03 ASSESSMENT — PAIN - FUNCTIONAL ASSESSMENT: PAIN_FUNCTIONAL_ASSESSMENT: 0-10

## 2023-04-03 NOTE — ANESTHESIA POSTPROCEDURE EVALUATION
Department of Anesthesiology  Postprocedure Note    Patient: Osmany Resendiz  MRN: [id number]  YOB: 1950  Date of evaluation: 4/3/2023      Procedure Summary     Date: 04/03/23 Room / Location: [facility]    Anesthesia Start: 1329 Anesthesia Stop: 1400    Procedures:       COLONOSCOPY      COLONOSCOPY POLYPECTOMY SNARE/COLD BIOPSY Diagnosis:       History of colon polyps      Anemia, unspecified type      (History of colon polyps [Z86.010] Anemia, unspecified type [D64.9])    Surgeons: José Urban MD Responsible Provider: Merlin Perez MD    Anesthesia Type: MAC ASA Status: 3          Anesthesia Type: No value filed.     Cindy Phase I: Cindy Score: 10    Cindy Phase II: Cindy Score: 10      Anesthesia Post Evaluation    Patient location during evaluation: PACU  Patient participation: complete - patient participated  Level of consciousness: awake  Pain score: 0  Airway patency: patent  Nausea & Vomiting: no nausea  Complications: no  Cardiovascular status: hemodynamically stable  Respiratory status: acceptable  Hydration status: stable

## 2023-04-03 NOTE — PROCEDURES
that some or all of this record was generated using voice recognition software. If there are any questions about the content of this document, please contact the author as some errors in translation may have occurred.

## 2023-04-03 NOTE — PROGRESS NOTES
Ambulatory Surgery/Procedure Discharge Note    Vitals:    04/03/23 1420   BP: 132/74   Pulse: 84   Resp: 16   Temp:    SpO2: 100%       In: 700 [I.V.:700]  Out: -     Restroom use offered before discharge. Yes    Pain assessment:  none  Pain Level: 0    Pt a&o x4. VSS. Pt denies pain and nausea. Reviewed d/c instructions and removed IV. Patient discharged to home/self care.  Patient discharged via wheel chair by transporter to waiting family/S.O.       4/3/2023 2:27 PM

## 2023-04-03 NOTE — DISCHARGE INSTRUCTIONS
as your health care provider. We always strive to provide you with excellent care while you are here. You may receive a survey in the mail regarding your care. We would appreciate you taking a few minutes of your time to complete this survey.

## 2023-04-03 NOTE — ANESTHESIA PRE PROCEDURE
MD at [address] [place] [address] Dr LY  2018    PROSTATECTOMY  2013    PTCA         Social History:    Social History     Tobacco Use    Smoking status: Former     Types: Cigarettes     Quit date: 1994     Years since quittin.2    Smokeless tobacco: Never   Substance Use Topics    Alcohol use: Yes     Comment: occasional                                Counseling given: Not Answered      Vital Signs (Current): There were no vitals filed for this visit. BP Readings from Last 3 Encounters:   22 130/78   22 120/80   22 128/75       NPO Status:                                                                                 BMI:   Wt Readings from Last 3 Encounters:   22 213 lb (96.6 kg)   22 222 lb 3.2 oz (100.8 kg)   22 225 lb 4.8 oz (102.2 kg)     There is no height or weight on file to calculate BMI.    CBC:   Lab Results   Component Value Date/Time    WBC 7.7 2022 04:09 AM    RBC 3.89 2022 04:09 AM    HGB 11.8 2022 04:09 AM    HCT 34.5 2022 04:09 AM    MCV 88.7 2022 04:09 AM    RDW 12.8 2022 04:09 AM     2022 04:09 AM       CMP:   Lab Results   Component Value Date/Time     2022 08:08 AM    K 4.6 2022 08:08 AM    K 4.0 2022 04:09 AM     2022 08:08 AM    CO2 24 2022 08:08 AM    BUN 18 2022 08:08 AM    CREATININE 0.9 2022 09:00 AM    CREATININE 0.91 2022 08:08 AM    GFRAA >60 2022 09:00 AM    LABGLOM >60 2022 09:00 AM    GLUCOSE 102 2022 08:08 AM    PROT 6.6 2022 08:08 AM    CALCIUM 9.6 2022 08:08 AM    BILITOT 0.6 2022 08:08 AM    ALKPHOS 86 2022 08:08 AM    AST 21 2022 08:08 AM    ALT 22 2022 08:08 AM       POC Tests: No results for input(s): POCGLU, POCNA, POCK, POCCL, POCBUN, POCHEMO, POCHCT in the last 72 hours.     Coags:   Lab Results   Component

## 2023-04-03 NOTE — H&P
Gastroenterology Note             Pre-operative History and Physical    Patient: Florence Conti  : 1950  CSN: [id number]    History Obtained From:  patient and/or guardian. HISTORY OF PRESENT ILLNESS:    The patient is a 71 y.o. male  here for history of colon polyps, mild anemia which is thought to be secondary to postsurgical status. .      Past Medical History:    Past Medical History:   Diagnosis Date    CAD (coronary artery disease)     S/P NSTEMI with SURINDER LCx 4/14/15    Esophageal cancer ([facility] [address]     History of tobacco use     Quit smoking. Hyperlipidemia     on statin, LDL     Hypertension     controlled    Plantar fasciitis     Prostate cancer [facility])      Past Surgical History:    Past Surgical History:   Procedure Laterality Date    CARDIAC SURGERY      coronary stent x1    COLONOSCOPY      CORONARY ANGIOPLASTY WITH STENT PLACEMENT      ENDOSCOPY, COLON, DIAGNOSTIC      ESOPHAGECTOMY N/A 2022    ESOPHAGOGASTRODUODENOSCOPY, DANIKA BHARAT ESOPHAGECTOMY WITH OPEN THORACOTOMY, BOTOX TO PYLORUS, INTERCOSTAL NERVE BLOCK WITH CRYOABLATION performed by Iker Damon MD at [address] [place] [address]      PROSTATECTOMY  2013    PTCA      TONSILLECTOMY       Medications Prior to Admission:   No current facility-administered medications on file prior to encounter. Current Outpatient Medications on File Prior to Encounter   Medication Sig Dispense Refill    rivaroxaban (XARELTO) 20 MG TABS tablet Take 1 tablet by mouth daily Starting 22 30 tablet 2    metoprolol tartrate (LOPRESSOR) 25 MG tablet Take 50 mg in AM and 25mg at night 270 tablet 3    Nutritional Supplements (VITAMIN D BOOSTER PO) Take by mouth daily       atorvastatin (LIPITOR) 40 MG tablet Take 1 tablet by mouth daily      aspirin 81 MG tablet Take 1 tablet by mouth daily          Allergies:  Patient has no known allergies.       Social History:   Social History     Tobacco Use    Smoking status:

## 2023-05-22 ENCOUNTER — HOSPITAL ENCOUNTER (OUTPATIENT)
Dept: CT IMAGING | Age: 73
Discharge: HOME | End: 2023-05-22
Payer: COMMERCIAL

## 2023-05-22 DIAGNOSIS — C15.5 MALIGNANT NEOPLASM OF ABDOMINAL ESOPHAGUS (HCC): ICD-10-CM

## 2023-05-22 PROCEDURE — 74177 CT ABD & PELVIS W/CONTRAST: CPT

## 2023-05-22 PROCEDURE — 6360000004 HC RX CONTRAST MEDICATION: Performed by: INTERNAL MEDICINE

## 2023-05-22 RX ADMIN — IOPAMIDOL 75 ML: 755 INJECTION, SOLUTION INTRAVENOUS at 07:57

## 2023-05-22 RX ADMIN — IOPAMIDOL 50 ML: 612 INJECTION, SOLUTION INTRAVENOUS at 07:57

## 2023-11-27 ENCOUNTER — HOSPITAL ENCOUNTER (OUTPATIENT)
Dept: CT IMAGING | Age: 73
Discharge: HOME | End: 2023-11-27
Attending: INTERNAL MEDICINE
Payer: COMMERCIAL

## 2023-11-27 DIAGNOSIS — C15.8 MALIGNANT NEOPLASM OF OVERLAPPING SITES OF ESOPHAGUS (HCC): ICD-10-CM

## 2023-11-27 PROCEDURE — 74177 CT ABD & PELVIS W/CONTRAST: CPT

## 2023-11-27 PROCEDURE — 6360000004 HC RX CONTRAST MEDICATION: Performed by: INTERNAL MEDICINE

## 2023-11-27 RX ADMIN — IOPAMIDOL 75 ML: 755 INJECTION, SOLUTION INTRAVENOUS at 08:08

## 2023-11-27 RX ADMIN — IOPAMIDOL 50 ML: 612 INJECTION, SOLUTION INTRAVENOUS at 08:08

## 2024-02-13 ENCOUNTER — ANESTHESIA EVENT (OUTPATIENT)
Dept: ENDOSCOPY | Age: 74
End: 2024-02-13
Payer: COMMERCIAL

## 2024-02-13 ENCOUNTER — ANESTHESIA (OUTPATIENT)
Dept: ENDOSCOPY | Age: 74
End: 2024-02-13
Payer: COMMERCIAL

## 2024-02-13 ENCOUNTER — HOSPITAL ENCOUNTER (OUTPATIENT)
Age: 74
Setting detail: OUTPATIENT SURGERY
Discharge: HOME | End: 2024-02-13
Attending: INTERNAL MEDICINE | Admitting: INTERNAL MEDICINE
Payer: COMMERCIAL

## 2024-02-13 VITALS
RESPIRATION RATE: 16 BRPM | BODY MASS INDEX: 26.82 KG/M2 | SYSTOLIC BLOOD PRESSURE: 112 MMHG | HEART RATE: 68 BPM | WEIGHT: 198 LBS | HEIGHT: 72 IN | TEMPERATURE: 97.7 F | DIASTOLIC BLOOD PRESSURE: 63 MMHG | OXYGEN SATURATION: 100 %

## 2024-02-13 DIAGNOSIS — C15.9 MALIGNANT NEOPLASM OF ESOPHAGUS, UNSPECIFIED LOCATION (HCC): ICD-10-CM

## 2024-02-13 PROCEDURE — 2500000003 HC RX 250 WO HCPCS: Performed by: NURSE ANESTHETIST, CERTIFIED REGISTERED

## 2024-02-13 PROCEDURE — 2580000003 HC RX 258: Performed by: ANESTHESIOLOGY

## 2024-02-13 PROCEDURE — 6360000002 HC RX W HCPCS: Performed by: NURSE ANESTHETIST, CERTIFIED REGISTERED

## 2024-02-13 PROCEDURE — 7100000010 HC PHASE II RECOVERY - FIRST 15 MIN: Performed by: INTERNAL MEDICINE

## 2024-02-13 PROCEDURE — 3700000000 HC ANESTHESIA ATTENDED CARE: Performed by: INTERNAL MEDICINE

## 2024-02-13 PROCEDURE — 7100000011 HC PHASE II RECOVERY - ADDTL 15 MIN: Performed by: INTERNAL MEDICINE

## 2024-02-13 PROCEDURE — 88305 TISSUE EXAM BY PATHOLOGIST: CPT

## 2024-02-13 PROCEDURE — 2709999900 HC NON-CHARGEABLE SUPPLY: Performed by: INTERNAL MEDICINE

## 2024-02-13 PROCEDURE — 3609012700 HC EGD DILATION SAVORY: Performed by: INTERNAL MEDICINE

## 2024-02-13 PROCEDURE — 3700000001 HC ADD 15 MINUTES (ANESTHESIA): Performed by: INTERNAL MEDICINE

## 2024-02-13 RX ORDER — LIDOCAINE HYDROCHLORIDE 20 MG/ML
INJECTION, SOLUTION EPIDURAL; INFILTRATION; INTRACAUDAL; PERINEURAL PRN
Status: DISCONTINUED | OUTPATIENT
Start: 2024-02-13 | End: 2024-02-13 | Stop reason: SDUPTHER

## 2024-02-13 RX ORDER — CYANOCOBALAMIN 1000 UG/ML
1000 INJECTION, SOLUTION INTRAMUSCULAR; SUBCUTANEOUS
COMMUNITY

## 2024-02-13 RX ORDER — PROPOFOL 10 MG/ML
INJECTION, EMULSION INTRAVENOUS PRN
Status: DISCONTINUED | OUTPATIENT
Start: 2024-02-13 | End: 2024-02-13 | Stop reason: SDUPTHER

## 2024-02-13 RX ORDER — PROPOFOL 10 MG/ML
INJECTION, EMULSION INTRAVENOUS CONTINUOUS PRN
Status: DISCONTINUED | OUTPATIENT
Start: 2024-02-13 | End: 2024-02-13 | Stop reason: SDUPTHER

## 2024-02-13 RX ORDER — SODIUM CHLORIDE, SODIUM LACTATE, POTASSIUM CHLORIDE, CALCIUM CHLORIDE 600; 310; 30; 20 MG/100ML; MG/100ML; MG/100ML; MG/100ML
INJECTION, SOLUTION INTRAVENOUS CONTINUOUS
Status: DISCONTINUED | OUTPATIENT
Start: 2024-02-13 | End: 2024-02-13 | Stop reason: HOSPADM

## 2024-02-13 RX ADMIN — PROPOFOL 150 MG: 10 INJECTION, EMULSION INTRAVENOUS at 11:05

## 2024-02-13 RX ADMIN — PROPOFOL 150 MCG/KG/MIN: 10 INJECTION, EMULSION INTRAVENOUS at 11:05

## 2024-02-13 RX ADMIN — SODIUM CHLORIDE, POTASSIUM CHLORIDE, SODIUM LACTATE AND CALCIUM CHLORIDE: 600; 310; 30; 20 INJECTION, SOLUTION INTRAVENOUS at 09:29

## 2024-02-13 RX ADMIN — LIDOCAINE HYDROCHLORIDE 80 MG: 20 INJECTION, SOLUTION EPIDURAL; INFILTRATION; INTRACAUDAL; PERINEURAL at 11:05

## 2024-02-13 NOTE — ANESTHESIA PRE PROCEDURE
Department of Anesthesiology  Preprocedure Note       Name:  Nahum Johnson   Age:  73 y.o.  :  1950                                          MRN:  [id number]         Date:  2024      Surgeon: Surgeon(s):  Donavan Arteaga MD    Procedure: Procedure(s):  ESOPHAGOGASTRODUODENOSCOPY    Medications prior to admission:   Prior to Admission medications    Medication Sig Start Date End Date Taking? Authorizing Provider   cyanocobalamin 1000 MCG/ML injection Inject 1 mL into the muscle every 30 days   Yes Hal Lee MD   rivaroxaban (XARELTO) 20 MG TABS tablet Take 1 tablet by mouth daily Starting 22  Nilsa Childers PA   metoprolol tartrate (LOPRESSOR) 25 MG tablet Take 50 mg in AM and 25mg at night 20   Francis Rodarte MD   Nutritional Supplements (VITAMIN D BOOSTER PO) Take by mouth daily     Hal Lee MD   atorvastatin (LIPITOR) 40 MG tablet Take 1 tablet by mouth daily    Hal Lee MD   aspirin 81 MG tablet Take 1 tablet by mouth daily    Hal Lee MD       Current medications:    Current Facility-Administered Medications   Medication Dose Route Frequency Provider Last Rate Last Admin    lactated ringers IV soln infusion   IntraVENous Continuous Pawel Moore MD 50 mL/hr at 24 New Bag at 24       Allergies:  No Known Allergies    Problem List:    Patient Active Problem List   Diagnosis Code    NSTEMI (non-ST elevation myocardial infarction) ([facility]) I21.4    HTN (hypertension) I10    CAD (coronary artery disease) I25.10    HLD (hyperlipidemia) E78.5    Esophageal cancer (HCC) C15.9       Past Medical History:        Diagnosis Date    CAD (coronary artery disease)     S/P NSTEMI with SURINDER LCx 4/14/15    Esophageal cancer (HCC)     surgery,chemo,radiation    History of tobacco use     Quit smoking.     Hyperlipidemia     on statin, LDL     Hypertension     controlled    Plantar fasciitis     Prostate cancer

## 2024-02-13 NOTE — H&P
metoprolol tartrate (LOPRESSOR) 25 MG tablet Take 50 mg in AM and 25mg at night 270 tablet 3    Nutritional Supplements (VITAMIN D BOOSTER PO) Take by mouth daily       atorvastatin (LIPITOR) 40 MG tablet Take 1 tablet by mouth daily      aspirin 81 MG tablet Take 1 tablet by mouth daily          Allergies:  Patient has no known allergies.      Social History:   Social History     Tobacco Use    Smoking status: Former     Current packs/day: 0.00     Types: Cigarettes     Quit date: 1994     Years since quittin.1    Smokeless tobacco: Never   Substance Use Topics    Alcohol use: Yes     Comment: 1-2 bourbons/week     Family History:   Family History   Problem Relation Age of Onset    Heart Disease Father     Cancer Sister     Cancer Brother     Cancer Maternal Uncle        PHYSICAL EXAM:      BP (!) 179/90   Pulse 67   Temp 97.7 °F (36.5 °C) (Temporal)   Resp 16   Ht 1.829 m (6')   Wt 89.8 kg (198 lb)   SpO2 100%   BMI 26.85 kg/m²  I        Heart:   within normal limits    Lungs:  CTA bilat anteriorly,  Normal effort    Abdomen:   soft, NT, ND      ASA Grade:  ASA 3 - Patient with moderate systemic disease with functional limitations    Mallampati Class: 2      ASSESSMENT AND PLAN:    1.  Patient is a 73 y.o. male here for EGD/   2.  Procedure options, risks and benefits reviewed with patient.  Patient expresses understanding and wishes to proceed.    Donavan Arteaga MD,   [facility]  2024    Please note that some or all of this record was generated using voice recognition software. If there are any questions about the content of this document, please contact the author as some errors in translation may have occurred.

## 2024-02-13 NOTE — DISCHARGE INSTRUCTIONS
Follow-up biopsies in the next 7 to 10 days.  EGD in 1 to 2 years for esophageal cancer screening.  Follow-up with  as scheduled.

## 2024-02-13 NOTE — PROCEDURES
Endoscopy Note    Patient: Nahum Johnson  : 1950  CSN: [id number]    Procedure: Esophagogastroduodenoscopy with biopsy, esophageal dilation    Date:  2024     Surgeon:  Donavan Arteaga MD     Referring Physician:  Savannah Ruth MD    Preoperative Diagnosis:  Malignant neoplasm of esophagus, unspecified location (HCC) [C15.9]    Postoperative Diagnosis:  * No post-op diagnosis entered *    Anesthesia:  MAC    EBL: minimal to none.    Indications: This is a 73 y.o. year old male who presents today with  esophageal cancer status post radiation and chemotherapy followed by resection of the distal esophagus with gastric pull-through. .    Description of Procedure:  Informed consent was obtained from the patient after explanation of indications, benefits and possible risks and complications of the procedure.  The patient was then taken to the endoscopy suite, placed in the left lateral decubitus position and the above IV sedation was administrered.    The Olympus video endoscope was passed through the hypopharynx into the esophagus. The scope was advanced all the way to the second portion of the duodenum.  The GE junction was at approximately 30 cms. The scope was slowly withdrawn and mucosa was carefully evaluated including a retroflex view of the proximal stomach.  Findings and interventions are described below.  The patient was decompressed and the scope was then withdrawn.    Gastric or Duodenal ulcer present: No    The patient tolerated the procedure well and was taken to the post anesthesia care unit in good condition.  There were no immediate complications.      Impression:    Squamocolumnar junction was located at 30 cm.  Staples were visualized.  No endoscopic evidence of recurrence of esophageal cancer was seen at the spot.  No obvious strictures or masses were noted.  Stomach examination was grossly unremarkable-no residual food was seen in the stomach.  Pyloric channel duodenal bulb, second

## 2024-02-13 NOTE — ANESTHESIA POSTPROCEDURE EVALUATION
Department of Anesthesiology  Postprocedure Note    Patient: Nahum Johnson  MRN: [id number]  YOB: 1950  Date of evaluation: 2/13/2024    Procedure Summary       Date: 02/13/24 Room / Location: [facility] ENDO  / [place]    Anesthesia Start: 1100 Anesthesia Stop: 1112    Procedure: EGD DILATION RIVERA EGD WITH BIOPSY Diagnosis:       Malignant neoplasm of esophagus, unspecified location (HCC)      (Malignant neoplasm of esophagus, unspecified location (HCC) [C15.9])    Surgeons: Donavan Arteaga MD Responsible Provider: Sonal Cummings DO    Anesthesia Type: MAC ASA Status: 3            Anesthesia Type: No value filed.    Cindy Phase I: Cindy Score: 10    Cindy Phase II:      Anesthesia Post Evaluation    Patient location during evaluation: PACU  Patient participation: complete - patient participated  Level of consciousness: awake and alert  Airway patency: patent  Cardiovascular status: blood pressure returned to baseline  Respiratory status: acceptable  Hydration status: euvolemic  Pain management: adequate    No notable events documented.

## 2024-02-13 NOTE — PROGRESS NOTES
Ambulatory Surgery/Procedure Discharge Note    Vitals:    02/13/24 1120   BP: 112/63   Pulse: 68   Resp: 16   Temp:    SpO2: 100%       No intake/output data recorded.    Restroom use offered before discharge.  Yes    Pain assessment:  level of pain (1-10, 10 severe),   Pain Level: 0        Patient discharged to home/self care. Patient discharged via wheel chair by transporter to waiting family/S.O.       2/13/2024 11:56 AM

## 2024-09-17 ENCOUNTER — HOSPITAL ENCOUNTER (OUTPATIENT)
Dept: CT IMAGING | Age: 74
Discharge: HOME | End: 2024-09-17
Payer: COMMERCIAL

## 2024-09-17 DIAGNOSIS — C15.5 MALIGNANT NEOPLASM OF LOWER THIRD OF ESOPHAGUS (HCC): ICD-10-CM

## 2024-09-17 LAB
PERFORMED ON: NORMAL
POC CREATININE: 0.9 MG/DL (ref 0.8–1.3)
POC SAMPLE TYPE: NORMAL

## 2024-09-17 PROCEDURE — 82565 ASSAY OF CREATININE: CPT

## 2024-09-17 PROCEDURE — 71260 CT THORAX DX C+: CPT

## 2024-09-17 PROCEDURE — 6360000004 HC RX CONTRAST MEDICATION

## 2024-09-17 RX ORDER — IOPAMIDOL 612 MG/ML
50 INJECTION, SOLUTION INTRAVASCULAR
Status: COMPLETED | OUTPATIENT
Start: 2024-09-17 | End: 2024-09-17

## 2024-09-17 RX ORDER — IOPAMIDOL 755 MG/ML
75 INJECTION, SOLUTION INTRAVASCULAR
Status: COMPLETED | OUTPATIENT
Start: 2024-09-17 | End: 2024-09-17

## 2024-09-17 RX ADMIN — IOPAMIDOL 75 ML: 755 INJECTION, SOLUTION INTRAVENOUS at 08:48

## 2024-09-17 RX ADMIN — IOPAMIDOL 50 ML: 612 INJECTION, SOLUTION INTRAVENOUS at 07:52

## (undated) DEVICE — RELOAD STPL 3.5MM L60MM 0DEG UNIV TISS PUR TI 6 ROW LIN

## (undated) DEVICE — TROCAR: Brand: KII SLEEVE

## (undated) DEVICE — SUTURE PERMAHAND SZ 3-0 L30IN NONABSORBABLE BLK L26MM SH C017D

## (undated) DEVICE — SOLUTION IV IRRIG POUR BRL 0.9% SODIUM CHL 2F7124

## (undated) DEVICE — STERILE LATEX POWDER-FREE SURGICAL GLOVESWITH NITRILE COATING: Brand: PROTEXIS

## (undated) DEVICE — RELOAD STPL 2.5MM L60MM 0DEG VASC TISS TAN TI 6 ROW LIN

## (undated) DEVICE — TROCAR: Brand: KII FIOS FIRST ENTRY

## (undated) DEVICE — SUTURE ETHBND EXCEL SZ 2-0 L36IN NONABSORBABLE GRN L26MM SH X523H

## (undated) DEVICE — PROBE ABLATN NERVE BLOCK 180 DEG 8 MM BALL TIP CRYOSPHERE

## (undated) DEVICE — SUTURE PERMA-HAND SZ 2-0 L30IN NONABSORBABLE BLK L26MM SH K833H

## (undated) DEVICE — DRAPE 33X23IN INCISE ANTIMICROB IOBAN 2

## (undated) DEVICE — SUTURE PERMAHAND SZ 2-0 L30IN NONABSORBABLE BLK SH L26MM C016D

## (undated) DEVICE — CLIP INT L ORNG TI TRNSVRS GRV CHEVRON SHP W/ PRECIS TIP TO

## (undated) DEVICE — SUTURE MCRYL + SZ 4-0 L18IN ABSRB UD L19MM PS-2 3/8 CIR MCP496G

## (undated) DEVICE — Device

## (undated) DEVICE — SUTURE VCRL SZ 0 L27IN ABSRB UD L36MM CT-1 1/2 CIR J260H

## (undated) DEVICE — TOWEL,OR,DSP,ST,BLUE,STD,6/PK,12PK/CS: Brand: MEDLINE

## (undated) DEVICE — STAPLER INT L L25MM DIA5MM GI WHT TI BARIATRIC CIR CUT 2

## (undated) DEVICE — SUTURE VCRL SZ 3-0 L27IN ABSRB UD L36MM CT-1 1/2 CIR J258H

## (undated) DEVICE — [HIGH FLOW INSUFFLATOR,  DO NOT USE IF PACKAGE IS DAMAGED,  KEEP DRY,  KEEP AWAY FROM SUNLIGHT,  PROTECT FROM HEAT AND RADIOACTIVE SOURCES.]: Brand: PNEUMOSURE

## (undated) DEVICE — 3M™ TEGADERM™ CHG DRESSING 25/CARTON 4 CARTONS/CASE 1657: Brand: TEGADERM™

## (undated) DEVICE — 3M™ STERI-DRAPE™ INSTRUMENT POUCH 1018: Brand: STERI-DRAPE™

## (undated) DEVICE — Z DISCONTINUED USE 2272117 DRAPE SURG 3 QTR N INVASIVE 2 LAYR DISP

## (undated) DEVICE — SUTURE ETHBND EXCEL SZ 5 L30IN NONABSORBABLE GRN L40MM V-37 MB66G

## (undated) DEVICE — CLIP NSL 16-18FR YEL FOR NSL TUBE RET SYS AMT BRIDLE PRO

## (undated) DEVICE — DRAIN SURG SGL COLL PT TB FOR ATS BG OASIS

## (undated) DEVICE — DISSECTOR ULTRASONIC L39CM CRV JAW CRDLSS SONICISION

## (undated) DEVICE — SUTURE PROL SZ 4-0 L36IN NONABSORBABLE BLU L26MM SH 1/2 CIR 8521H

## (undated) DEVICE — STAPLER INT L16CM STD UNIV RELD DISP TRI-STAPLE ENDO GIA

## (undated) DEVICE — SUTURE NONABSORBABLE MONOFILAMENT 4-0 RB-1 36 IN BLU PROLENE 8557H

## (undated) DEVICE — SNARES COLD OVAL 10MM THIN

## (undated) DEVICE — TRAP SPEC RETRV CLR PLAS POLYP IN LN SUCT QUIK CTCH

## (undated) DEVICE — ELECTRODE LAP L36CM PTFE WIRE J HK CLEANCOAT

## (undated) DEVICE — SUTURE PERMA-HAND SZ 0 L18IN NONABSORBABLE BLK L30MM FSL 678G

## (undated) DEVICE — CHLORAPREP 26ML ORANGE

## (undated) DEVICE — SYRINGE MED 10ML LUERLOCK TIP W/O SFTY DISP

## (undated) DEVICE — NEEDLE 25GAX5.0MM INJ CARR LOCKE

## (undated) DEVICE — GAUZE,SPONGE,4"X4",16PLY,XRAY,STRL,LF: Brand: MEDLINE

## (undated) DEVICE — FORCEPS BX L240CM JAW DIA2.8MM L CAP W/ NDL MIC MESH TOOTH

## (undated) DEVICE — ADHESIVE SKIN CLSR 0.7ML TOP DERMBND ADV

## (undated) DEVICE — Z DISCONTINUED MER MEDLINE NO SUB IDED DRAIN SURG W0.63XL18IN LTX PENROSE UNIF WALL THICKNESS

## (undated) DEVICE — CONNECTOR PERF W0.25XH3/8IN BASE Y SHP REDUC W/O LUERLOCK

## (undated) DEVICE — DRAIN SURG 24FR BLAK SIL HUBLESS 4 CHANNELED RADPQ EXTN TB

## (undated) DEVICE — AGENT HEMSTAT W6XL9IN OXIDIZED REGENERATED CELOS ABSRB FOR

## (undated) DEVICE — DRAPE THER FLUID WARMING 66X44 IN FLAT SLUSH DBL DISC ORS

## (undated) DEVICE — TUBING, SUCTION, 3/16" X 10', STRAIGHT: Brand: MEDLINE

## (undated) DEVICE — TIBURON LAPAROSCOPIC CHOLECYSTECTOMY DRAPE: Brand: CONVERTORS

## (undated) DEVICE — ENDOSCOPIC KIT COMPLIANCE EGD W/ 2 END ENDOKIT

## (undated) DEVICE — GAUZE,SPONGE,4"X4",8PLY,STRL,LF,10/TRAY: Brand: MEDLINE